# Patient Record
Sex: FEMALE | Race: WHITE
[De-identification: names, ages, dates, MRNs, and addresses within clinical notes are randomized per-mention and may not be internally consistent; named-entity substitution may affect disease eponyms.]

---

## 2017-10-30 ENCOUNTER — HOSPITAL ENCOUNTER (EMERGENCY)
Dept: HOSPITAL 75 - ER | Age: 27
Discharge: HOME | End: 2017-10-30
Payer: SELF-PAY

## 2017-10-30 VITALS — HEIGHT: 72 IN | WEIGHT: 254 LBS | BODY MASS INDEX: 34.4 KG/M2

## 2017-10-30 DIAGNOSIS — F41.9: ICD-10-CM

## 2017-10-30 DIAGNOSIS — Z87.891: ICD-10-CM

## 2017-10-30 DIAGNOSIS — F31.9: ICD-10-CM

## 2017-10-30 DIAGNOSIS — Z80.0: ICD-10-CM

## 2017-10-30 DIAGNOSIS — J40: Primary | ICD-10-CM

## 2017-10-30 PROCEDURE — 71020: CPT

## 2017-10-30 PROCEDURE — 99284 EMERGENCY DEPT VISIT MOD MDM: CPT

## 2017-10-30 NOTE — XMS REPORT
[facility]

 Created on: 10/11/2016



Jr Rico

External Reference #: [id number]

: 1990

Sex: Female



Demographics







 Address  [address] [place]  [address]

 

 Home Phone  [phone]

 

 Preferred Language  Unknown

 

 Marital Status  Unknown

 

 Confucianist Affiliation  Unknown

 

 Race  White

 

 Ethnic Group  Not  or 





Author







 Author  CLAUDY MCCOLLUM  eClinicalWorks

 

 Address  Unknown

 

 Phone  Unavailable







Care Team Providers







 Care Team Member Name  Role  Phone

 

 CLAUDY MCCOLLUM  CP  Unavailable



                                                                



Allergies, Adverse Reactions, Alerts

          





 Substance  Reaction  Event Type

 

 N.K.D.A.  Info Not Available  Non Drug Allergy



                                                                               
         



Problems

          





 Problem Type  Condition  Code  Onset Dates  Condition Status

 

 Assessment  27 weeks gestation of pregnancy  Z3A.27     Active

 

 Problem  Proteinuria affecting pregnancy in second trimester  O12.12     Active

 

 Problem  Hypertension affecting pregnancy in second trimester  O16.2     Active

 

 Problem  Partial placenta previa, second trimester  O44.12     Active

 

 Assessment  Diabetes mellitus screening  Z13.1     Active

 

 Assessment  Pregnancy, first, second trimester  Z34.02     Active

 

 Problem  Abnormal quad screen  O28.0     Active

 

 Problem  Elevated blood pressure affecting pregnancy in first trimester, 
antepartum  O13.1     Active



                                                                               
                                                                               



Medications

          





 Medication  Code System  Code  Instructions  Start Date  End Date  Status  
Dosage

 

 Robitussin DM  NDC  [address]-15               not defined

 

 Prenatal  NDC  [address]15-[address]               not defined



                                                                               
                   



Procedures

          





 Procedure  Coding System  Code  Date

 

 LAB NOT BILLED BY [facility]  CPT-4  NOBLL  Oct 07, 2016

 

 Office Visit, Est Pt., Level 2  CPT-4  87193  Oct 07, 2016

 

 URINE-NO MICRO  CPT-4  82415  Oct 07, 2016

 

 VENIPUNCT, ROUTINE*  CPT-4  16793  Oct 07, 2016



                                                                               
                                                 



Vital Signs

          





 Date/Time:  Oct 07, 2016

 

 Cardiac Monitoring Heart Rate  100 bpm

 

 Weight  272.2 lbs

 

 Height  71 in

 

 BMI  37.964 Index

 

 Blood Pressure Diastolic  82 mmHg

 

 Blood Pressure Systolic  134 mmHg



                                                                    



Results

          





 Name  Result  Date  Reference Range  Unit  Abnormality Flag

 

 GLUCOSE CARTER 1 HOUR               

 

 ----Gestational Diabetes Screen  119  60649249     mg/dL   

 

 CBC               

 

 ----Basos  0  88168621     %   

 

 ----MCV  92  89850213  79-97   fL   

 

 ----Hematocrit  37.7  66998006  34.0-46.6   %   

 

 ----Eos  2  40593072     %   

 

 ----MCHC  34.0  53826406  31.5-35.7   g/dL   

 

 ----Monocytes  6  2016     %   

 

 ----MCH  31.3  05084088  26.6-33.0   pg   

 

 ----Lymphs  21  2016     %   

 

 ----Eos (Absolute)  0.2  56486616  0.0-0.4   x10E3/uL   

 

 ----WBC  9.9  46652363  3.4-10.8   x10E3/uL   

 

 ----Monocytes(Absolute)  0.6  58063018  0.1-0.9   x10E3/uL   

 

 ----Lymphs (Absolute)  2.1  79983610  0.7-3.1   x10E3/uL   

 

 ----Hemoglobin  12.8  94867556  11.1-15.9   g/dL   

 

 ----Neutrophils (Absolute)  7.0  51513904  1.4-7.0   x10E3/uL   

 

 ----RBC  4.09  96262700  3.77-5.28   x10E6/uL   

 

 ----Immature Grans (Abs)  0.0  47435865  0.0-0.1   x10E3/uL   

 

 ----Immature Granulocytes  0  2016     %   

 

 ----Neutrophils  71  14334087     %   

 

 ----Baso (Absolute)  0.0  74699166  0.0-0.2   x10E3/uL   

 

 ----RDW  13.4  30240327  12.3-15.4   %   

 

 ----Platelets  185  76767695  150-379   x10E3/uL   

 

 UA OB DIP (IN HOUSE)               

 

 ----Glucose  negative  2016         

 

 ----Protein  trace  2016         

 

 ROUTINE VENIPUNCTURE               



                                                                               
                   



Summary Purpose

          eClinicalWorks Submission

## 2017-10-30 NOTE — XMS REPORT
[facility]

 Created on: 2017



Jr Rico

External Reference #: [id number]

: 1990

Sex: Female



Demographics







 Address  [address] [place]  [address]

 

 Preferred Language  Unknown

 

 Marital Status  Unknown

 

 Episcopal Affiliation  Unknown

 

 Race  Unknown

 

 Ethnic Group  Unknown





Author







 Author  CLAUDY MCCOLLUM

 

 [facility]

 

 Address  [address] [place]  [address]



 

 Phone  [phone]







Care Team Providers







 Care Team Member Name  Role  Phone

 

 CLAUDY MCCOLLUM  Unavailable  [phone]







PROBLEMS







 Type  Condition  ICD9-CM Code  XOI88-US Code  Onset Dates  Condition Status  
SNOMED Code

 

 Problem  Generalized anxiety disorder     F41.1     Active  05033058

 

 Problem  Anxiety, generalized     F41.1     Active  80431588

 

 Problem  Postpartum depression     F53     Active  29049290

 

 Problem  Patient is a currently breast-feeding mother     Z39.1     Active  
632867875

 

 Problem  History of gestational hypertension     Z87.59     Active  187601512







ALLERGIES

Unknown Allergies



SOCIAL HISTORY

No smoking Hx information available



PLAN OF CARE





VITAL SIGNS





MEDICATIONS







 Medication  Instructions  Dosage  Frequency  Start Date  End Date  Duration  
Status

 

 NIFEdipine ER 60 mg  Orally Once a day  1 tablet on an empty stomach  24h       30 days  Active







RESULTS

No Results



PROCEDURES

No Known procedures



IMMUNIZATIONS

No Known Immunizations

## 2017-10-30 NOTE — XMS REPORT
[facility]

 Created on: 2016



Jr Rico

External Reference #: [id number]

: 1990

Sex: Female



Demographics







 Address  [address] [place]  [address]

 

 Home Phone  [phone]

 

 Preferred Language  Unknown

 

 Marital Status  Unknown

 

 Jew Affiliation  Unknown

 

 Race  White

 

 Ethnic Group  Not  or 





Author







 Author  CLAUDY MCCOLLUM

 

 [facility]  eClinical[employer]

 

 Address  Unknown

 

 Phone  Unavailable







Care Team Providers







 Care Team Member Name  Role  Phone

 

 CLAUDY MCCOLLUM    Unavailable



                                                                



Allergies

          No Known Allergies                                                   
                                     



Problems

          





 Problem Type  Condition  Code  Onset Dates  Condition Status

 

 Problem  Proteinuria affecting pregnancy in second trimester  O12.12     Active

 

 Problem  Hypertension affecting pregnancy in second trimester  O16.2     Active

 

 Problem  Partial placenta previa, second trimester  O44.12     Active

 

 Assessment  Hypertension affecting pregnancy in second trimester  O16.2     
Active

 

 Assessment  Proteinuria affecting pregnancy in second trimester  O12.12     
Active

 

 Problem  Abnormal quad screen  O28.0     Active

 

 Problem  Elevated blood pressure affecting pregnancy in first trimester, 
antepartum  O13.1     Active



                                                                               
                                                                     



Medications

          No Known Medications                                                 
                             



Results

          No Known Results                                                     
               



Summary Purpose

          eClinicalWorks Submission

## 2017-10-30 NOTE — XMS REPORT
[facility]

 Created on: 2016



Jr Rico

External Reference #: [id number]

: 1990

Sex: Female



Demographics







 Address  [address] [place]  [address]

 

 Home Phone  [phone]

 

 Preferred Language  Unknown

 

 Marital Status  Unknown

 

 Taoist Affiliation  Unknown

 

 Race  White

 

 Ethnic Group  Not  or 





Author







 Author  CLAUDY MCCOLLUM  eClinicalWorks

 

 Address  Unknown

 

 Phone  Unavailable







Care Team Providers







 Care Team Member Name  Role  Phone

 

 CLAUDY MCCOLLUM    Unavailable



                                                                



Allergies

          No Known Allergies                                                   
                                     



Problems

          





 Problem Type  Condition  Code  Onset Dates  Condition Status

 

 Problem  Elevated blood pressure affecting pregnancy in first trimester, 
antepartum  O13.1     Active

 

 Problem  Proteinuria affecting pregnancy in third trimester  O12.13     Active

 

 Problem  Third trimester bleeding  O46.93     Active

 

 Problem  Chronic hypertension affecting pregnancy  O10.919     Active

 

 Problem  Hypertension affecting pregnancy in second trimester  O16.2     Active

 

 Problem  Abnormal quad screen  O28.0     Active

 

 Problem  Partial placenta previa, second trimester  O44.12     Active

 

 Problem  Proteinuria affecting pregnancy in second trimester  O12.12     Active



                                                                               
                                                                               



Medications

          No Known Medications                                                 
                             



Results

          No Known Results                                                     
               



Summary Purpose

          eClinicalWorks Submission

## 2017-10-30 NOTE — XMS REPORT
[facility]

 Created on: 2017



Jr Rico

External Reference #: [id number]

: 1990

Sex: Female



Demographics







 Address  [address] [place]  [address]

 

 Preferred Language  Unknown

 

 Marital Status  Unknown

 

 Orthodox Affiliation  Unknown

 

 Race  Unknown

 

 Ethnic Group  Unknown





Author







 Author  CLAUDY MCCOLLUM

 

 [facility]

 

 Address  [address] [place]  [address]



 

 Phone  [phone]







Care Team Providers







 Care Team Member Name  Role  Phone

 

 CLAUDY MCCOLLUM  Unavailable  [phone]







PROBLEMS







 Type  Condition  ICD9-CM Code  XXK11-SZ Code  Onset Dates  Condition Status  
SNOMED Code

 

 Problem  Anxiety, generalized     F41.1     Active  10305677

 

 Problem  Generalized anxiety disorder     F41.1     Active  08299269

 

 Problem  Postpartum depression     F53     Active  77225006

 

 Problem  Patient is a currently breast-feeding mother     Z39.1     Active  
489161957

 

 Problem  History of gestational hypertension     Z87.59     Active  335619789







ALLERGIES

Unknown Allergies



SOCIAL HISTORY

No smoking Hx information available



PLAN OF CARE





VITAL SIGNS







 Height  71 in  2016

 

 Weight  286.5 lbs  2016

 

 Temperature  97.9 degrees Fahrenheit  2016

 

 Heart Rate  80 bpm  2016

 

 Respiratory Rate  20   2016

 

 BMI  39.95 kg/m2  2016

 

 Blood pressure systolic  156 mmHg  2016

 

 Blood pressure diastolic  92 mmHg  2016







MEDICATIONS

Unknown Medications



RESULTS

No Results



PROCEDURES

No Known procedures



IMMUNIZATIONS

No Known Immunizations

## 2017-10-30 NOTE — XMS REPORT
[facility]

 Created on: 2017



Jr Rico

External Reference #: [id number]

: 1990

Sex: Female



Demographics







 Address  [address] [place]  [address]

 

 Preferred Language  Unknown

 

 Marital Status  Unknown

 

 Hoahaoism Affiliation  Unknown

 

 Race  Unknown

 

 Ethnic Group  Unknown





Author







 Author  CLAUDY MCCOLLUM

 

 [facility]

 

 Address  [address] [place]  [address]



 

 Phone  [phone]







Care Team Providers







 Care Team Member Name  Role  Phone

 

 CLAUDY MCCOLLUM  Unavailable  [phone]







PROBLEMS







 Type  Condition  ICD9-CM Code  YKG38-TU Code  Onset Dates  Condition Status  
SNOMED Code

 

 Problem  Anxiety, generalized     F41.1     Active  70446248

 

 Problem  Generalized anxiety disorder     F41.1     Active  97135902

 

 Problem  Postpartum depression     F53     Active  76146877

 

 Problem  Patient is a currently breast-feeding mother     Z39.1     Active  
224857428

 

 Problem  History of gestational hypertension     Z87.59     Active  415314032







ALLERGIES

Unknown Allergies



SOCIAL HISTORY

No smoking Hx information available



PLAN OF CARE





VITAL SIGNS





MEDICATIONS

Unknown Medications



RESULTS

No Results



PROCEDURES

No Known procedures



IMMUNIZATIONS

No Known Immunizations

## 2017-10-30 NOTE — XMS REPORT
[facility]

 Created on: 2017



Jr Rico

External Reference #: [id number]

: 1990

Sex: Female



Demographics







 Address  [address] [place]  [address]

 

 Preferred Language  Unknown

 

 Marital Status  Unknown

 

 Mormon Affiliation  Unknown

 

 Race  Unknown

 

 Ethnic Group  Unknown





Author







 Author  ANIKA MARADIAGA

 

 [facility]

 

 Address  [address] [place]  [address]



 

 Phone  [phone]







Care Team Providers







 Care Team Member Name  Role  Phone

 

 ANIKA MARADIAGA  Unavailable  [phone]







PROBLEMS







 Type  Condition  ICD9-CM Code  CDB89-XG Code  Onset Dates  Condition Status  
SNOMED Code

 

 Problem  Generalized anxiety disorder     F41.1     Active  68469261

 

 Problem  Anxiety, generalized     F41.1     Active  08599380

 

 Problem  Postpartum depression     F53     Active  07123894

 

 Problem  Patient is a currently breast-feeding mother     Z39.1     Active  
883098274

 

 Problem  History of gestational hypertension     Z87.59     Active  571100751







ALLERGIES

Unknown Allergies



SOCIAL HISTORY

No smoking Hx information available



PLAN OF CARE







 Activity  Details









  









 Follow Up  1 Week Reason:Depression and anxiety







VITAL SIGNS





MEDICATIONS

Unknown Medications



RESULTS

No Results



PROCEDURES







 Procedure  Date Ordered  Related Diagnosis  Body Site

 

 Psych diagnostic evaluation, new patient  2017      







IMMUNIZATIONS

No Known Immunizations

## 2017-10-30 NOTE — XMS REPORT
[facility]

 Created on: 2016



Jr Rico

External Reference #: [id number]

: 1990

Sex: Female



Demographics







 Address  [place]  [address]

 

 Home Phone  [phone]

 

 Preferred Language  Unknown

 

 Marital Status  Unknown

 

 Restorationism Affiliation  Unknown

 

 Race  White

 

 Ethnic Group  Not  or 





Author







 Author  CLAUDY MCCOLLUM  eClinicalWorks

 

 Address  Unknown

 

 Phone  Unavailable







Care Team Providers







 Care Team Member Name  Role  Phone

 

 CLAUDY MCCOLLUM    Unavailable



                                                                



Allergies

          No Known Allergies                                                   
                                     



Problems

          





 Problem Type  Condition  Code  Onset Dates  Condition Status

 

 Problem  Elevated blood pressure affecting pregnancy in first trimester, 
antepartum  O13.1     Active

 

 Assessment  Abnormal quad screen  O28.0     Active

 

 Problem  Abnormal quad screen  O28.0     Active

 

 Assessment  17 weeks gestation of pregnancy  Z3A.17     Active



                                                                               
                                       



Medications

          





 Medication  Code System  Code  Instructions  Start Date  End Date  Status  
Dosage

 

 Prenatal  NDC  [address]               not defined



                                                                               
         



Procedures

          





 Procedure  Coding System  Code  Date

 

 Office Visit, Est Pt., Level 2  CPT-4  56504  Aug 04, 2016



                                                                               
                   



Vital Signs

          





 Date/Time:  Aug 04, 2016

 

 Cardiac Monitoring Heart Rate  94 bpm

 

 Weight  263.3 lbs

 

 Height  71 in

 

 BMI  36.723 Index

 

 Blood Pressure Diastolic  79 mmHg

 

 Blood Pressure Systolic  136 mmHg



                                                                              



Results

          No Known Results                                                     
               



Summary Purpose

          eClinicalWorks Submission

## 2017-10-30 NOTE — XMS REPORT
[facility]

 Created on: 2016



Jr Rico

External Reference #: [id number]

: 1990

Sex: Female



Demographics







 Address  [address] [place]  [address]

 

 Home Phone  [phone]

 

 Preferred Language  Unknown

 

 Marital Status  Unknown

 

 Tenriism Affiliation  Unknown

 

 Race  White

 

 Ethnic Group  Not  or 





Author







 Author  CLAUDY MCCOLLUM  eClinicalWorks

 

 Address  Unknown

 

 Phone  Unavailable







Care Team Providers







 Care Team Member Name  Role  Phone

 

 CLAUDY MCCOLLUM  CP  Unavailable



                                                                



Allergies

          No Known Allergies                                                   
                                     



Problems

          





 Problem Type  Condition  Code  Onset Dates  Condition Status

 

 Problem  Proteinuria affecting pregnancy in second trimester  O12.12     Active

 

 Problem  Hypertension affecting pregnancy in second trimester  O16.2     Active

 

 Problem  Partial placenta previa, second trimester  O44.12     Active

 

 Assessment  Hypertension affecting pregnancy in second trimester  O16.2     
Active

 

 Problem  Abnormal quad screen  O28.0     Active

 

 Problem  Elevated blood pressure affecting pregnancy in first trimester, 
antepartum  O13.1     Active



                                                                               
                                                           



Medications

          No Known Medications                                                 
                   



Results

          





 Name  Result  Date  Reference Range  Unit  Abnormality Flag

 

 UA OB DIP (IN HOUSE)               

 

 ----Glucose  negative  2016         

 

 ----Protein  trace  2016         



                                                                    



Summary Purpose

          eClinicalWorks Submission

## 2017-10-30 NOTE — XMS REPORT
[facility]

 Created on: 2017



Jr Rico

External Reference #: [id number]

: 1990

Sex: Female



Demographics







 Address  [address] [place]  [address]

 

 Preferred Language  Unknown

 

 Marital Status  Unknown

 

 Pentecostalism Affiliation  Unknown

 

 Race  Unknown

 

 Ethnic Group  Unknown





Author







 Author  CLAUDY MCCOLLUM

 

 [facility]

 

 Address  [address] [place]  [address]



 

 Phone  [phone]







Care Team Providers







 Care Team Member Name  Role  Phone

 

 CLAUDY MCCOLLUM  Unavailable  [phone]







PROBLEMS







 Type  Condition  ICD9-CM Code  XCC10-GJ Code  Onset Dates  Condition Status  
SNOMED Code

 

 Problem  Generalized anxiety disorder     F41.1     Active  65961283

 

 Problem  Anxiety, generalized     F41.1     Active  61369969

 

 Problem  Postpartum depression     F53     Active  66004603

 

 Problem  Patient is a currently breast-feeding mother     Z39.1     Active  
857775607

 

 Problem  History of gestational hypertension     Z87.59     Active  624374435







ALLERGIES

Unknown Allergies



SOCIAL HISTORY

No smoking Hx information available



PLAN OF CARE





VITAL SIGNS





MEDICATIONS







 Medication  Instructions  Dosage  Frequency  Start Date  End Date  Duration  
Status

 

 Prozac 20 mg  Orally Once a day  1 capsule in the morning  24h    
   30 day(s)  Active







RESULTS

No Results



PROCEDURES

No Known procedures



IMMUNIZATIONS

No Known Immunizations

## 2017-10-30 NOTE — XMS REPORT
[facility]

 Created on: 2017



Jr Rico

External Reference #: [id number]

: 1990

Sex: Female



Demographics







 Address  [address] [place]  [address]

 

 Preferred Language  Unknown

 

 Marital Status  Unknown

 

 Mu-ism Affiliation  Unknown

 

 Race  Unknown

 

 Ethnic Group  Unknown





Author







 Author  CHUNCLAUDY LANDEROS

 

 Organization  [facility]

 

 Address  [address] [place]  [address]



 

 Phone  [phone]







Care Team Providers







 Care Team Member Name  Role  Phone

 

 CLAUDY MCCOLLUM  Unavailable  [phone]







PROBLEMS







 Type  Condition  ICD9-CM Code  DKU28-WG Code  Onset Dates  Condition Status  
SNOMED Code

 

 Assessment  25 weeks gestation of pregnancy     Z3A.25  23 Sep, 2016  Active  
12127748

 

 Assessment  Encounter for immunization     Z23  23 Sep, 2016  Active  001255701

 

 Problem  Partial placenta previa, second trimester     O44.12     Active  
55843294

 

 Problem  Proteinuria affecting pregnancy in second trimester     O12.12     
Active  72624999

 

 Problem  Elevated blood pressure affecting pregnancy in first trimester, 
antepartum     O13.1     Active  24398666

 

 Assessment  Pregnancy, first, second trimester     Z34.02  23 Sep, 2016  
Active  120968951

 

 Problem  Hypertension affecting pregnancy in second trimester     O16.2     
Active  286784870

 

 Problem  Abnormal quad screen     O28.0     Active  181820332







ALLERGIES







 Substance  Reaction  Event Type  Date  Status

 

 N.K.D.A.  Unknown  Non Drug Allergy  23 Sep, 2016  Unknown







SOCIAL HISTORY

No smoking Hx information available



PLAN OF CARE





VITAL SIGNS







 Height  71 in  2016

 

 Weight  270.9 lbs  2016

 

 Heart Rate  92 bpm  2016

 

 Respiratory Rate  20   2016

 

 BMI  37.783 kg/m2  2016

 

 Blood pressure systolic  130 mmHg  2016

 

 Blood pressure diastolic  82 mmHg  2016







MEDICATIONS







 Medication  Instructions  Dosage  Frequency  Start Date  End Date  Duration  
Status

 

 Prenatal                    Active







RESULTS







 Name  Result  Date  Reference Range

 

 UA OB DIP (IN HOUSE)     2016   

 

 Glucose  negative      

 

 Protein  trace      







PROCEDURES







 Procedure  Date Ordered  Related Diagnosis  Body Site

 

 URINE-NO MICRO  2016      

 

 Office Visit, Est Pt., Level 2  2016      

 

 SINGLE IMMUNIZATION ADMIN  2016      

 

 FLUARIX QUAD P-FREE 3 AND UP .50 2016  2016      







IMMUNIZATIONS







 Vaccine  Route  Administration Date  Status

 

 FLUARIX QUAD P-FREE 3 AND UP .50 2016  IM Intramuscular  2016  
Administered

## 2017-10-30 NOTE — XMS REPORT
[facility]

 Created on: 2016



Jr Rico

External Reference #: [id number]

: 1990

Sex: Female



Demographics







 Address  [address] [place]  [address]

 

 Home Phone  [phone]

 

 Preferred Language  Unknown

 

 Marital Status  Unknown

 

 [employer] Affiliation  Unknown

 

 Race  White

 

 Ethnic Group  Not  or 





Author







 Author  CLAUDY MCCOLLUM  eClinicalWorks

 

 Address  Unknown

 

 Phone  Unavailable







Care Team Providers







 Care Team Member Name  Role  Phone

 

 CLAUDY MCCOLLUM    Unavailable



                                                                



Allergies

          No Known Allergies                                                   
                                     



Problems

          





 Problem Type  Condition  Code  Onset Dates  Condition Status

 

 Problem  Elevated blood pressure affecting pregnancy in first trimester, 
antepartum  O13.1     Active

 

 Assessment  Pregnancy, first, second trimester  Z34.02     Active

 

 Problem  Abnormal quad screen  O28.0     Active

 

 Assessment  21 weeks gestation of pregnancy  Z3A.21     Active

 

 Assessment  Evaluate anatomy not seen on prior sonogram  Z36     Active

 

 Assessment  Hypertension affecting pregnancy, second trimester  O16.2     
Active

 

 Assessment  Proteinuria affecting pregnancy in third trimester  O12.13     
Active



                                                                               
                                                                     



Medications

          





 Medication  Code System  Code  Instructions  Start Date  End Date  Status  
Dosage

 

 Prenatal  NDC  [address]               not defined



                                                                               
         



Procedures

          





 Procedure  Coding System  Code  Date

 

 LACTATE (LD) (LDH) ENZYME  CPT-4  43023  Aug 26, 2016

 

 ASSAY OF BLOOD/URIC ACID  CPT-4  23542  Aug 26, 2016

 

 URINE-NO MICRO  CPT-4  69277  Aug 26, 2016

 

 COMPLETE CBC W/AUTO DIFF WBC  CPT-4  22792  Aug 26, 2016

 

 COMPREHEN METABOLIC PANEL  CPT-4  07483  Aug 26, 2016

 

 VENIPUNCT, ROUTINE*  CPT-4  12808  Aug 26, 2016

 

 Office Visit, Est Pt., Level 3  CPT-4  82732  Aug 26, 2016



                                                                               
                                                                               



Vital Signs

          





 Date/Time:  Aug 26, 2016

 

 Cardiac Monitoring Heart Rate  90 bpm

 

 Weight  264.4 lbs

 

 Height  71 in

 

 BMI  36.876 Index

 

 Blood Pressure Diastolic  79 mmHg

 

 Blood Pressure Systolic  144 mmHg



                                                                              



Results

          No Known Results                                                     
               



Summary Purpose

          eClinicalWorks Submission

## 2017-10-30 NOTE — XMS REPORT
[facility]

 Created on: 2017



Jr Rico

External Reference #: [id number]

: 1990

Sex: Female



Demographics







 Address  [address] [place]  [address]

 

 Preferred Language  Unknown

 

 Marital Status  Unknown

 

 Ep[employer]opalian Affiliation  Unknown

 

 Race  Unknown

 

 Ethnic Group  Unknown





Author







 Author  CHUN  CLAUDY

 

 Organization  [facility]

 

 Address  [address] [place]  [address]



 

 Phone  [phone]







Care Team Providers







 Care Team Member Name  Role  Phone

 

 CHUNEUFEMIA LANDEROSHANY  Unavailable  [phone]







PROBLEMS







 Type  Condition  ICD9-CM Code  RJU22-NL Code  Onset Dates  Condition Status  
SNOMED Code

 

 Problem  Abnormal quad screen     O28.0     Active  402120604

 

 Problem  Elevated blood pressure affecting pregnancy in first trimester, 
antepartum     O13.1     Active  62667374

 

 Assessment  22 weeks gestation of pregnancy     Z3A.22  08 Sep, 2016  Active  
46897815

 

 Assessment  Hypertension affecting pregnancy in second trimester     O16.2  08 
Sep, 2016  Active  185095660

 

 Assessment  Proteinuria affecting pregnancy in second trimester     O12.12  08 
Sep, 2016  Active  22827839







ALLERGIES

Unknown Allergies



SOCIAL HISTORY

No smoking Hx information available



PLAN OF CARE





VITAL SIGNS







 Height  71 in  2016

 

 Weight  270.0 lbs  2016

 

 Heart Rate  78 bpm  2016

 

 Respiratory Rate  18   2016

 

 BMI  37.657 kg/m2  2016

 

 Blood pressure systolic  126 mmHg  2016

 

 Blood pressure diastolic  80 mmHg  2016







MEDICATIONS







 Medication  Instructions  Dosage  Frequency  Start Date  End Date  Duration  
Status

 

 Prenatal                    Active







RESULTS







 Name  Result  Date  Reference Range

 

 UA OB DIP (IN HOUSE)     2016   

 

 Glucose  Negative      

 

 Protein  Trace      







PROCEDURES







 Procedure  Date Ordered  Related Diagnosis  Body Site

 

 URINE-NO MICRO  2016      

 

 Office Visit, Est Pt., Level 2  2016      







IMMUNIZATIONS

No Known Immunizations

## 2017-10-30 NOTE — XMS REPORT
[facility]

 Created on: 2017



Jr Rico

External Reference #: [id number]

: 1990

Sex: Female



Demographics







 Address  [address] [place]  [address]

 

 Preferred Language  Unknown

 

 Marital Status  Unknown

 

 Islam Affiliation  Unknown

 

 Race  Unknown

 

 Ethnic Group  Unknown





Author







 Author  CHUN  CLAUDY

 

 Organization  [facility]

 

 Address  [address] [place]  [address]



 

 Phone  [phone]







Care Team Providers







 Care Team Member Name  Role  Phone

 

 AMADEO MCCOLLUMY  Unavailable  [phone]







PROBLEMS







 Type  Condition  ICD9-CM Code  SZF39-AW Code  Onset Dates  Condition Status  
SNOMED Code

 

 Problem  Proteinuria affecting pregnancy in second trimester     O12.12     
Active  26544562

 

 Problem  Hypertension affecting pregnancy in second trimester     O16.2     
Active  324172291

 

 Assessment  Pregnancy, first, second trimester     Z34.02  16 Sep, 2016  
Active  145397340

 

 Problem  Abnormal quad screen     O28.0     Active  235969407

 

 Problem  Elevated blood pressure affecting pregnancy in first trimester, 
antepartum     O13.1     Active  45067958







ALLERGIES







 Substance  Reaction  Event Type  Date  Status

 

 N.K.D.A.  Unknown  Non Drug Allergy  16 Sep, 2016  Unknown







SOCIAL HISTORY

No smoking Hx information available



PLAN OF CARE





VITAL SIGNS







 Height  71 in  2016

 

 Weight  272 lbs  2016

 

 Heart Rate  100 bpm  2016

 

 Respiratory Rate  20   2016

 

 BMI  37.93 kg/m2  2016

 

 Blood pressure systolic  128 mmHg  2016

 

 Blood pressure diastolic  78 mmHg  2016







MEDICATIONS







 Medication  Instructions  Dosage  Frequency  Start Date  End Date  Duration  
Status

 

 Prenatal                    Active







RESULTS







 Name  Result  Date  Reference Range

 

 UA OB DIP (IN HOUSE)     2016   

 

 Glucose  negative      

 

 Protein  negative      







PROCEDURES







 Procedure  Date Ordered  Related Diagnosis  Body Site

 

 URINE-NO MICRO  2016      







IMMUNIZATIONS

No Known Immunizations

## 2017-10-30 NOTE — XMS REPORT
[facility]

 Created on: 2016



Jr Rico

External Reference #: [id number]

: 1990

Sex: Female



Demographics







 Address  [address] [place]  [address]

 

 Home Phone  [phone]

 

 Preferred Language  Unknown

 

 Marital Status  Unknown

 

 Faith Affiliation  Unknown

 

 Race  White

 

 Ethnic Group  Not  or 





Author







 Author  CLAUDY MCCOLLUM  eClinicalWorks

 

 Address  Unknown

 

 Phone  Unavailable







Care Team Providers







 Care Team Member Name  Role  Phone

 

 CLAUDY MCCOLLUM    Unavailable



                                                                



Allergies

          No Known Allergies                                                   
                                     



Problems

          





 Problem Type  Condition  Code  Onset Dates  Condition Status

 

 Problem  Elevated blood pressure affecting pregnancy in first trimester, 
antepartum  O13.1     Active

 

 Problem  Proteinuria affecting pregnancy in third trimester  O12.13     Active

 

 Problem  Third trimester bleeding  O46.93     Active

 

 Problem  Chronic hypertension affecting pregnancy  O10.919     Active

 

 Problem  Hypertension affecting pregnancy in second trimester  O16.2     Active

 

 Problem  Abnormal quad screen  O28.0     Active

 

 Problem  Partial placenta previa, second trimester  O44.12     Active

 

 Problem  Proteinuria affecting pregnancy in second trimester  O12.12     Active



                                                                               
                                                                               



Medications

          No Known Medications                                                 
                             



Results

          No Known Results                                                     
               



Summary Purpose

          eClinicalWorks Submission

## 2017-10-30 NOTE — XMS REPORT
[facility]

 Created on: 2016



Jr Rico

External Reference #: [id number]

: 1990

Sex: Female



Demographics







 Address  [address] [place]  [address]

 

 Home Phone  [phone]

 

 Preferred Language  Unknown

 

 Marital Status  Unknown

 

 Christian Affiliation  Unknown

 

 Race  White

 

 Ethnic Group  Not  or 





Author







 Author  CLAUDY MCCOLLUM  eClinicalWorks

 

 Address  Unknown

 

 Phone  Unavailable







Care Team Providers







 Care Team Member Name  Role  Phone

 

 CLAUDY MCCOLLUM    Unavailable



                                                                



Allergies

          No Known Allergies                                                   
                                     



Problems

          





 Problem Type  Condition  Code  Onset Dates  Condition Status

 

 Problem  Elevated blood pressure affecting pregnancy in first trimester, 
antepartum  O13.1     Active

 

 Assessment  Pregnancy, first, second trimester  Z34.02     Active

 

 Problem  Abnormal quad screen  O28.0     Active

 

 Assessment  21 weeks gestation of pregnancy  Z3A.21     Active

 

 Assessment  Evaluate anatomy not seen on prior sonogram  Z36     Active

 

 Assessment  Proteinuria affecting pregnancy in third trimester  O12.13     
Active

 

 Assessment  Hypertension affecting pregnancy, second trimester  O16.2     
Active



                                                                               
                                                                     



Medications

          No Known Medications                                                 
                                       



Procedures

          





 Procedure  Coding System  Code  Date

 

 ASSAY OF PROTEIN, URINE  CPT-4  26853  Aug 29, 2016



                                                                              



Results

          No Known Results                                                     
               



Summary Purpose

          eClinicalWorks Submission

## 2017-10-30 NOTE — XMS REPORT
[facility]

 Created on: 2016



Jr Rico

External Reference #: [id number]

: 1990

Sex: Female



Demographics







 Address  [address] [place]  [address]

 

 Home Phone  [phone]

 

 Preferred Language  Unknown

 

 Marital Status  Unknown

 

 Hoahaoism Affiliation  Unknown

 

 Race  White

 

 Ethnic Group  Not  or 





Author







 Author  CLAUDY MCCOLLUM  eClinicalWorks

 

 Address  Unknown

 

 Phone  Unavailable







Care Team Providers







 Care Team Member Name  Role  Phone

 

 CLAUDY MCCOLLUM    Unavailable



                                                                



Allergies

          No Known Allergies                                                   
                                     



Problems

          





 Problem Type  Condition  Code  Onset Dates  Condition Status

 

 Problem  Proteinuria affecting pregnancy in second trimester  O12.12     Active

 

 Problem  Hypertension affecting pregnancy in second trimester  O16.2     Active

 

 Problem  Partial placenta previa, second trimester  O44.12     Active

 

 Assessment  Hypertension affecting pregnancy in second trimester  O16.2     
Active

 

 Assessment  Proteinuria affecting pregnancy in second trimester  O12.12     
Active

 

 Problem  Abnormal quad screen  O28.0     Active

 

 Problem  Elevated blood pressure affecting pregnancy in first trimester, 
antepartum  O13.1     Active



                                                                               
                                                                     



Medications

          No Known Medications                                                 
                                       



Procedures

          





 Procedure  Coding System  Code  Date

 

 VENIPUNCT, ROUTINE*  CPT-4  61947  Oct 04, 2016

 

 LAB NOT BILLED BY [facility]  CPT-4  NOBLL  Oct 04, 2016



                                                                              



Results

          





 Name  Result  Date  Reference Range  Unit  Abnormality Flag

 

 URIC ACID, SERUM               

 

 ----Uric Acid, Serum  5.0  46908531  2.5-7.1   mg/dL   

 

 URINE PROTEIN TO CREATININE RATIO               

 

 ----Creatinine, Urine  58.8  51588008  Not Estab.   mg/dL   

 

 ----Protein/Creat Ratio  170  24563317  0-200   mg/g creat   

 

 ----Protein,Total,Urine  10.0  52909787  Not Estab.   mg/dL   

 

 ROUTINE VENIPUNCTURE               

 

 LDH               

 

 ----LDH  152  37787282  119-226   IU/L   

 

 CBC               

 

 ----RDW  13.3  39187742  12.3-15.4   %   

 

 ----MCHC  34.6  78106436  31.5-35.7   g/dL   

 

 ----MCH  31.0  73941846  26.6-33.0   pg   

 

 ----MCV  89  39545044  79-97   fL   

 

 ----Hematocrit  38.1  48262373  34.0-46.6   %   

 

 ----Hemoglobin  13.2  43632926  11.1-15.9   g/dL   

 

 ----Immature Granulocytes  0  97041148     %   

 

 ----RBC  4.26  34889936  3.77-5.28   x10E6/uL   

 

 ----WBC  10.0  07243547  3.4-10.8   x10E3/uL   

 

 ----Immature Grans (Abs)  0.0  49665929  0.0-0.1   x10E3/uL   

 

 ----Eos (Absolute)  0.2  98308698  0.0-0.4   x10E3/uL   

 

 ----Basos  0  02437746     %   

 

 ----Baso (Absolute)  0.0  27941803  0.0-0.2   x10E3/uL   

 

 ----Neutrophils (Absolute)  7.2  62346712  1.4-7.0   x10E3/uL  H

 

 ----Lymphs (Absolute)  1.8  57684634  0.7-3.1   x10E3/uL   

 

 ----Monocytes(Absolute)  0.8  14669488  0.1-0.9   x10E3/uL   

 

 ----Neutrophils  72  60508814     %   

 

 ----Lymphs  18  20659001     %   

 

 ----Monocytes  8  05878720     %   

 

 ----Eos  2  06332295     %   

 

 ----Platelets  175  58973049  150-379   x10E3/uL   

 

 CMP               

 

 ----Creatinine, Serum  0.55  02698597  0.57-1.00   mg/dL  L

 

 ----BUN  6  88461667  6-20   mg/dL   

 

 ----eGFR If Africn Am  151  44517521      >59   mL/min/1.73   

 

 ----eGFR If NonAfricn Am  131  91220598      >59   mL/min/1.73   

 

 ----Sodium, Serum  140  87600811  134-144   mmol/L   

 

 ----BUN/Creatinine Ratio  11  48675968  8-20       

 

 ----Chloride, Serum  105  55010616     mmol/L   

 

 ----Potassium, Serum  4.0  32579391  3.5-5.2   mmol/L   

 

 ----Carbon Dioxide, Total  19  2016  18-29   mmol/L   

 

 ----Protein, Total, Serum  6.1  03773610  6.0-8.5   g/dL   

 

 ----Calcium, Serum  9.2  46105037  8.7-10.2   mg/dL   

 

 ----Globulin, Total  2.5  2016  1.5-4.5   g/dL   

 

 ----Albumin, Serum  3.6  2016  3.5-5.5   g/dL   

 

 ----Bilirubin, Total  0.2  2016  0.0-1.2   mg/dL   

 

 ----Glucose, Serum  100  2016  65-99   mg/dL  H

 

 ----A/G Ratio  1.4  2016  1.1-2.5       

 

 ----ALT (SGPT)  28  2016  0-32   IU/L   

 

 ----Alkaline Phosphatase, S  99  2016     IU/L   

 

 ----AST (SGOT)  21  2016  0-40   IU/L   



                                                                               
                                       



Summary Purpose

          eClinicalWorks Submission

## 2017-10-30 NOTE — XMS REPORT
[facility]

 Created on: 2017



Jr Rico

External Reference #: [id number]

: 1990

Sex: Female



Demographics







 Address  [address] [place]  [address]

 

 Preferred Language  Unknown

 

 Marital Status  Unknown

 

 Ep[employer] Affiliation  Unknown

 

 Race  Unknown

 

 Ethnic Group  Unknown





Author







 Author  CLAUDY MCCOLLUM

 

 [facility]

 

 Address  [address] [place]  [address]



 

 Phone  [phone]







Care Team Providers







 Care Team Member Name  Role  Phone

 

 CLAUDY MCCOLLUM  Unavailable  [phone]







PROBLEMS







 Type  Condition  ICD9-CM Code  XEZ68-FO Code  Onset Dates  Condition Status  
SNOMED Code

 

 Problem  Anxiety, generalized     F41.1     Active  04705690

 

 Problem  Generalized anxiety disorder     F41.1     Active  59011660

 

 Problem  Postpartum depression     F53     Active  20359909

 

 Problem  Patient is a currently breast-feeding mother     Z39.1     Active  
828821465

 

 Problem  History of gestational hypertension     Z87.59     Active  310829745







ALLERGIES

Unknown Allergies



SOCIAL HISTORY

No smoking Hx information available



PLAN OF CARE





VITAL SIGNS







 Height  71 in  2016

 

 Blood pressure systolic  142 mmHg  2016

 

 Blood pressure diastolic  100 mmHg  2016







MEDICATIONS

Unknown Medications



RESULTS

No Results



PROCEDURES

No Known procedures



IMMUNIZATIONS

No Known Immunizations

## 2017-10-30 NOTE — XMS REPORT
[facility]

 Created on: 2016



Jr Rico

External Reference #: [id number]

: 1990

Sex: Female



Demographics







 [place]  [address]

 

 Home Phone  [phone]

 

 Preferred Language  Unknown

 

 Marital Status  Unknown

 

 Restoration Affiliation  Unknown

 

 Race  White

 

 Ethnic Group  Not  or 





Author







 Author  CLAUYD MCCOLLUM  eClinicalWorks

 

 Address  Unknown

 

 Phone  Unavailable







Care Team Providers







 Care Team Member Name  Role  Phone

 

 CLAUDY MCCOLLUM  CP  Unavailable



                                                                



Allergies, Adverse Reactions, Alerts

          





 Substance  Reaction  Event Type

 

 N.K.D.A.  Info Not Available  Non Drug Allergy



                                                                               
         



Problems

          





 Problem Type  Condition  Code  Onset Dates  Condition Status

 

 Assessment  Normal pregnancy, first  Z34.00     Active

 

 Assessment  17 weeks gestation of pregnancy  Z3A.17     Active

 

 Problem  Elevated blood pressure affecting pregnancy in first trimester, 
antepartum  O13.1     Active



                                                                               
                             



Medications

          





 Medication  Code System  Code  Instructions  Start Date  End Date  Status  
Dosage

 

 Prenatal  NDC  [address]               not defined



                                                                               
         



Procedures

          





 Procedure  Coding System  Code  Date

 

 ALPHA-FETOPROTEIN, SERUM  CPT-4  82686  2016

 

 INHIBIN A  CPT-4  68652  2016

 

 URINE-NO MICRO  CPT-4  77307  2016

 

 CHORIONIC GONADOTROPIN TEST  CPT-4  80158  2016

 

 ASSAY OF ESTRIOL  CPT-4  84314  2016

 

 Office Visit, Est Pt., Level 2  CPT-4  19994  2016

 

 VENIPUNCT, ROUTINE*  CPT-4  68501  2016



                                                                               
                                                                               



Vital Signs

          





 Date/Time:  2016

 

 Cardiac Monitoring Heart Rate  90 bpm

 

 Weight  260.8 lbs

 

 Height  71 in

 

 BMI  36.374 Index

 

 Blood Pressure Diastolic  82 mmHg

 

 Blood Pressure Systolic  138 mmHg



                                                                              



Results

          No Known Results                                                     
               



Summary Purpose

          eClinicalWorks Submission

## 2017-10-30 NOTE — XMS REPORT
[facility]

 Created on: 10/13/2016



Jr Rico

External Reference #: [id number]

: 1990

Sex: Female



Demographics







 Address  [address] [place]  [address]

 

 Home Phone  [phone]

 

 Preferred Language  Unknown

 

 Marital Status  Unknown

 

 Lutheran Affiliation  Unknown

 

 Race  White

 

 Ethnic Group  Not  or 





Author







 Author  CLAUDY MCCOLLUM  eClinicalWorks

 

 Address  Unknown

 

 Phone  Unavailable







Care Team Providers







 Care Team Member Name  Role  Phone

 

 CLAUDY MCCOLLUM  CP  Unavailable



                                                                



Allergies, Adverse Reactions, Alerts

          





 Substance  Reaction  Event Type

 

 N.K.D.A.  Info Not Available  Non Drug Allergy



                                                                               
         



Problems

          





 Problem Type  Condition  Code  Onset Dates  Condition Status

 

 Assessment  Proteinuria affecting pregnancy in second trimester  O12.12     
Active

 

 Assessment  27 weeks gestation of pregnancy  Z3A.27     Active

 

 Problem  Proteinuria affecting pregnancy in second trimester  O12.12     Active

 

 Problem  Hypertension affecting pregnancy in second trimester  O16.2     Active

 

 Problem  Partial placenta previa, second trimester  O44.12     Active

 

 Assessment  Syncope, unspecified syncope type  R55     Active

 

 Assessment  Hypertension affecting pregnancy in second trimester  O16.2     
Active

 

 Problem  Abnormal quad screen  O28.0     Active

 

 Problem  Elevated blood pressure affecting pregnancy in first trimester, 
antepartum  O13.1     Active



                                                                               
                                                                               
          



Medications

          





 Medication  Code System  Code  Instructions  Start Date  End Date  Status  
Dosage

 

 Prenatal  NDC  [address]               not defined



                                                                               
         



Procedures

          





 Procedure  Coding System  Code  Date

 

 MEASURE BLOOD OXYGEN LEVEL  CPT-4  12211  Oct 12, 2016

 

 URINALYSIS, AUTO, W/O SCOPE  CPT-4  87580  Oct 12, 2016

 

 ELECTROCARDIOGRAM, TRACING  CPT-4  05806  Oct 12, 2016

 

 [facility]  CPT-4  NOBLL  Oct 12, 2016

 

 DRUG SCREEN NON TLC DEVICES  CPT-4  13590  Oct 12, 2016

 

 VENIPUNCT, ROUTINE*  CPT-4  26585  Oct 12, 2016

 

 Office Visit, Est Pt., Level 3  CPT-4  59646  Oct 12, 2016



                                                                               
                                                                               



Vital Signs

          





 Date/Time:  Oct 12, 2016

 

 Cardiac Monitoring Heart Rate  98 bpm

 

 Weight  273 lbs

 

 Height  71 in

 

 BMI  38.076 Index

 

 Oximetry  98 %

 

 Blood Pressure Diastolic  90, lay down 118 mmHg

 

 Blood Pressure Systolic  160 mmHg



                                                                    



Results

          





 Name  Result  Date  Reference Range  Unit  Abnormality Flag

 

 EKG, TRACING (IN-HOUSE)               



                                                                    



Summary Purpose

          eClinicalWorks Submission

## 2017-10-30 NOTE — XMS REPORT
[facility]

 Created on: 2017



Jr Rico

External Reference #: [id number]

: 1990

Sex: Female



Demographics







 Address  [address] [place]  [address]

 

 Preferred Language  Unknown

 

 Marital Status  Unknown

 

 Religion Affiliation  Unknown

 

 Race  Unknown

 

 Ethnic Group  Unknown





Author







 Author  ANIKA MARADIAGA

 

 [facility]

 

 Address  [address] [place]  [address]



 

 Phone  [phone]







Care Team Providers







 Care Team Member Name  Role  Phone

 

 ANIKA MARADIAGA  Unavailable  [phone]







PROBLEMS







 Type  Condition  ICD9-CM Code  ZKL93-JK Code  Onset Dates  Condition Status  
SNOMED Code

 

 Problem  Generalized anxiety disorder     F41.1     Active  47066592

 

 Problem  Anxiety, generalized     F41.1     Active  20638897

 

 Problem  Postpartum depression     F53     Active  43816591

 

 Problem  Patient is a currently breast-feeding mother     Z39.1     Active  
819240606

 

 Problem  History of gestational hypertension     Z87.59     Active  478623595







ALLERGIES

Unknown Allergies



SOCIAL HISTORY

No smoking Hx information available



PLAN OF CARE







 Activity  Details









  









 Follow Up  1 Week Reason:Depression







VITAL SIGNS





MEDICATIONS

Unknown Medications



RESULTS

No Results



PROCEDURES







 Procedure  Date Ordered  Related Diagnosis  Body Site

 

 Psychotherapy, patient &/family, 45 minutes, established patient  2017
      







IMMUNIZATIONS

No Known Immunizations

## 2017-10-30 NOTE — XMS REPORT
[facility]

 Created on: 09/15/2017



Nael Ricokattyguero

External Reference #: [id number]

: 1990

Sex: Female



Demographics







 Address  [address] [place]  [address]

 

 Preferred Language  Unknown

 

 Marital Status  Unknown

 

 Congregational Affiliation  Unknown

 

 Race  Unknown

 

 Ethnic Group  Unknown





Author







 Author  CHUNCLAUDY

 

 [facility]

 

 Address  [address] [place]  [address]



 

 Phone  [phone]







Care Team Providers







 Care Team Member Name  Role  Phone

 

 CLAUDY MCCOLLUM  Unavailable  [phone]







PROBLEMS







 Type  Condition  ICD9-CM Code  SPY07-LC Code  Onset Dates  Condition Status  
SNOMED Code

 

 Problem  Generalized anxiety disorder     F41.1     Active  55978895

 

 Problem  Anxiety, generalized     F41.1     Active  48899386

 

 Problem  Postpartum depression     F53     Active  70584580

 

 Problem  Patient is a currently breast-feeding mother     Z39.1     Active  
940447401

 

 Problem  History of gestational hypertension     Z87.59     Active  191322887







ALLERGIES







 Substance  Reaction  Event Type  Date  Status

 

 N.K.D.A.  Unknown  Non Drug Allergy    Unknown







SOCIAL HISTORY

No smoking Hx information available



PLAN OF CARE







 Activity  Details









  









 Follow Up  3 Months Reason:HTN, depression







VITAL SIGNS







 Height  71 in  2017

 

 Weight  258.0 lbs  2017

 

 Temperature  98.0 degrees Fahrenheit  2017

 

 Heart Rate  78 bpm  2017

 

 Respiratory Rate  18   2017

 

 BMI  35.98 kg/m2  2017

 

 Blood pressure systolic  136 mmHg  2017

 

 Blood pressure diastolic  78 mmHg  2017







MEDICATIONS







 Medication  Instructions  Dosage  Frequency  Start Date  End Date  Duration  
Status

 

 Prenatal                    Active

 

 Depo-Provera 150 MG/ML  Intramuscular every 12 weeks  1 ml        
     Active

 

 Prozac 20 mg  Orally Once a day  1 capsule in the morning  24h    
      Active

 

 NIFEdipine ER 60 mg  Orally Once a day  1 tablet on an empty stomach  24h          Active







RESULTS







 Name  Result  Date  Reference Range

 

 PREGNANCY TEST, URINE (IN HOUSE)     2017   

 

 RESULTS  negative      

 

 Lot #  [id number]      

 

 Control  +      

 

 Exp date  2018      







PROCEDURES







 Procedure  Date Ordered  Related Diagnosis  Body Site

 

 INJ MDRXYPRGESTRON CNTRACPT 150 MG  2017      

 

 THER/PROPH/DIAG INJ, SC/IM  2017      

 

 Office Visit, Est Pt., Level 3  2017      

 

 URINE PREGNANCY TEST  2017      







IMMUNIZATIONS







 Vaccine  Route  Administration Date  Status

 

 MEDRXYPROGESTERONE ACETATE  IM Intramuscular  2017  Administered

## 2017-10-30 NOTE — XMS REPORT
[facility]

 Created on: 2016



Jr Rico

External Reference #: [id number]

: 1990

Sex: Female



Demographics







 Address  [address] [place]  [address]

 

 Home Phone  [phone]

 

 Preferred Language  Unknown

 

 Marital Status  Unknown

 

 Advent Affiliation  Unknown

 

 Race  White

 

 Ethnic Group  Not  or 





Author







 Author  CLAUDY MCCOLLUM  eClinicalWorks

 

 Address  Unknown

 

 Phone  Unavailable







Care Team Providers







 Care Team Member Name  Role  Phone

 

 CLAUDY MCCOLLUM  CP  Unavailable



                                                                



Allergies

          No Known Allergies                                                   
                                     



Problems

          





 Problem Type  Condition  Code  Onset Dates  Condition Status

 

 Assessment  Abnormal quad screen  O28.0     Active

 

 Assessment  Third trimester pregnancy  Z33.1     Active

 

 Assessment  31 weeks gestation of pregnancy  Z3A.31     Active

 

 Assessment  Encounter for immunization  Z23     Active

 

 Assessment  Proteinuria affecting pregnancy in third trimester  O12.13     
Active

 

 Problem  Third trimester bleeding  O46.93     Active

 

 Problem  Partial placenta previa, second trimester  O44.12     Active

 

 Problem  Proteinuria affecting pregnancy in third trimester  O12.13     Active

 

 Problem  Abnormal quad screen  O28.0     Active

 

 Problem  Elevated blood pressure affecting pregnancy in first trimester, 
antepartum  O13.1     Active

 

 Problem  Proteinuria affecting pregnancy in second trimester  O12.12     Active

 

 Problem  Hypertension affecting pregnancy in second trimester  O16.2     Active



                                                                               
                                                                               
                                        



Medications

          





 Medication  Code System  Code  Instructions  Start Date  End Date  Status  
Dosage

 

 Prenatal  NDC  [address]               not defined



                                                                               
         



Procedures

          





 Procedure  Coding System  Code  Date

 

 LAB NOT BILLED BY [facility]  CPT-4  NOBLL  2016

 

 Office Visit, Est Pt., Level 3  CPT-4  47288  2016

 

 URINE-NO MICRO  CPT-4  89860  2016

 

 SINGLE IMMUNIZATION ADMIN  CPT-4  07689  2016

 

 TDAP (BOOSTRIX)  CPT-4  69314  2016

 

 VENIPUNCT, ROUTINE*  CPT-4  59622  2016



                                                                               
                                                                     



Vital Signs

          





 Date/Time:  2016

 

 Cardiac Monitoring Heart Rate  100 bpm

 

 Weight  276 lbs

 

 Height  71 in

 

 BMI  38.494 Index

 

 Blood Pressure Diastolic  70 mmHg

 

 Blood Pressure Systolic  124 mmHg



                                                                    



Results

          





 Name  Result  Date  Reference Range  Unit  Abnormality Flag

 

 LDH               

 

 ----LDH  160  [id number]  119-226   IU/L   

 

 URIC ACID, SERUM               

 

 ----Uric Acid, Serum  5.0  38818839  2.5-7.1   mg/dL   

 

 UA OB DIP (IN HOUSE)               

 

 ----Glucose  negative  2016         

 

 ----Protein  trace  81967618         

 

 ROUTINE VENIPUNCTURE               

 

 CMP               

 

 ----BUN/Creatinine Ratio  12  2016  8-20       

 

 ----eGFR If Africn Am  147  09896316      >59   mL/min/1.73   

 

 ----eGFR If NonAfricn Am  128  18926849      >59   mL/min/1.73   

 

 ----Creatinine, Serum  0.58  03263637  0.57-1.00   mg/dL   

 

 ----Chloride, Serum  103  2016     mmol/L   

 

 ----Potassium, Serum  4.3  58246752  3.5-5.2   mmol/L   

 

 ----Sodium, Serum  140  35558528  136-144   mmol/L   

 

 ----Protein, Total, Serum  6.0  33662034  6.0-8.5   g/dL   

 

 ----Albumin, Serum  2.9  17860262  3.5-5.5   g/dL  L

 

 ----Globulin, Total  3.1  39687451  1.5-4.5   g/dL   

 

 ----A/G Ratio  0.9  2016  1.1-2.5      L

 

 ----BUN  7  2016  6-20   mg/dL   

 

 ----Glucose, Serum  85  93434231  65-99   mg/dL   

 

 ----Carbon Dioxide, Total  22  2016  18-29   mmol/L   

 

 ----Calcium, Serum  9.6  44325245  8.7-10.2   mg/dL   

 

 ----AST (SGOT)  15  2016  0-40   IU/L   

 

 ----ALT (SGPT)  16  2016  0-32   IU/L   

 

 ----Bilirubin, Total  0.2  35412164  0.0-1.2   mg/dL   

 

 ----Alkaline Phosphatase, S  140  2016     IU/L  H

 

 Ultrasound : OB, Follow-up               

 

 URINE PROTEIN TO CREATININE RATIO               

 

 ----Protein/Creat Ratio  198  49932643  0-200   mg/g creat   

 

 ----Protein,Total,Urine  33.8  69940355  Not Estab.   mg/dL   

 

 ----Creatinine, Urine  170.5  82582804  Not Estab.   mg/dL   

 

 CBC               

 

 ----Hemoglobin  13.2  28005813  11.1-15.9   g/dL   

 

 ----RBC  4.24  54397161  3.77-5.28   x10E6/uL   

 

 ----MCV  93  89368728  79-97   fL   

 

 ----Hematocrit  39.5  28455547  34.0-46.6   %   

 

 ----MCHC  33.4  10819834  31.5-35.7   g/dL   

 

 ----MCH  31.1  02087947  26.6-33.0   pg   

 

 ----Platelets  215  36325691  150-379   x10E3/uL   

 

 ----RDW  13.1  46627927  12.3-15.4   %   

 

 ----Neutrophils  68  08337539     %   

 

 ----Monocytes  7  90816811     %   

 

 ----Lymphs  23  89217851     %   

 

 ----Basos  0  59872314     %   

 

 ----Eos  2  38293439     %   

 

 ----Immature Grans (Abs)  0.0  56909068  0.0-0.1   x10E3/uL   

 

 ----Lymphs (Absolute)  2.6  04026891  0.7-3.1   x10E3/uL   

 

 ----WBC  11.3  91611998  3.4-10.8   x10E3/uL  H

 

 ----Immature Granulocytes  0  72057461     %   

 

 ----Neutrophils (Absolute)  7.6  54128328  1.4-7.0   x10E3/uL  H

 

 ----Baso (Absolute)  0.0  31450476  0.0-0.2   x10E3/uL   

 

 ----Monocytes(Absolute)  0.8  47997693  0.1-0.9   x10E3/uL   

 

 ----Eos (Absolute)  0.3  37396550  0.0-0.4   x10E3/uL   



                                                                               
                                                                               



Immunizations

          





 Vaccine  Administration Date

 

 TDAP (BOOSTRIX)  2016



                                                                    



Summary Purpose

          eClinicalWorks Submission

## 2017-10-30 NOTE — XMS REPORT
[facility]

 Created on: 2017



Jr Rico

External Reference #: [id number]

: 1990

Sex: Female



Demographics







 Address  [address] [place]  [address]

 

 Preferred Language  Unknown

 

 Marital Status  Unknown

 

 Adventist Affiliation  Unknown

 

 Race  Unknown

 

 Ethnic Group  Unknown





Author







 Author  CHUN  CLAUDY

 

 [facility]

 

 Address  [address] [place]  [address]



 

 Phone  [phone]







Care Team Providers







 Care Team Member Name  Role  Phone

 

 CHUNEUFEMIA LANDEROSHANY  Unavailable  [phone]







PROBLEMS







 Type  Condition  ICD9-CM Code  HSY77-DF Code  Onset Dates  Condition Status  
SNOMED Code

 

 Problem  Anxiety, generalized     F41.1     Active  72671136

 

 Problem  Generalized anxiety disorder     F41.1     Active  85299595

 

 Problem  Postpartum depression     F53     Active  64904045

 

 Problem  Patient is a currently breast-feeding mother     Z39.1     Active  
342611162

 

 Problem  History of gestational hypertension     Z87.59     Active  052714367







ALLERGIES

Unknown Allergies



SOCIAL HISTORY

No smoking Hx information available



PLAN OF CARE





VITAL SIGNS







 Height  71 in  2016

 

 Weight  288.0 lbs  2016

 

 Temperature  98.0 degrees Fahrenheit  2016

 

 BMI  40.168 kg/m2  2016

 

 Blood pressure systolic  140 mmHg  2016

 

 Blood pressure diastolic  76 mmHg  2016







MEDICATIONS







 Medication  Instructions  Dosage  Frequency  Start Date  End Date  Duration  
Status

 

 Prenatal                    Active







RESULTS







 Name  Result  Date  Reference Range

 

 UA OB DIP (IN HOUSE)     2016   

 

 Glucose  negative      

 

 Protein  trace      

 

 LDH     2016   

 

 LDH  170     119-226

 

 URIC ACID, SERUM     2016   

 

 Uric Acid, Serum  5.8     2.5-7.1

 

 URINE PROTEIN TO CREATININE RATIO     2016   

 

 Creatinine, Urine  218.9     Not Estab.

 

 Protein,Total,Urine  47.9     Not Estab.

 

 Protein/Creat Ratio  219     0-200

 

 CBC     2016   

 

 WBC  15.4     3.4-10.8

 

 RBC  4.19     3.77-5.28

 

 Hemoglobin  12.7     11.1-15.9

 

 Hematocrit  38.0     34.0-46.6

 

 MCV  91     79-97

 

 MCH  30.3     26.6-33.0

 

 MCHC  33.4     31.5-35.7

 

 RDW  12.3     12.3-15.4

 

 Platelets  178     150-379

 

 Neutrophils  61      

 

 Lymphs  31      

 

 Monocytes  7      

 

 Eos  1      

 

 Basos  0      

 

 Neutrophils (Absolute)  9.3     1.4-7.0

 

 Lymphs (Absolute)  4.7     0.7-3.1

 

 Monocytes(Absolute)  1.1     0.1-0.9

 

 Eos (Absolute)  0.1     0.0-0.4

 

 Baso (Absolute)  0.0     0.0-0.2

 

 Immature Granulocytes  0      

 

 Immature Grans (Abs)  0.0     0.0-0.1

 

 CMP     2016   

 

 Glucose, Serum  98     65-99

 

 BUN  9     6-20

 

 Creatinine, Serum  0.60     0.57-1.00

 

 eGFR If NonAfricn Am  126         >59

 

 eGFR If Africn Am  145         >59

 

 BUN/Creatinine Ratio  15     8-20

 

 Sodium, Serum  142     134-144

 

 Potassium, Serum  3.8     3.5-5.2

 

 Chloride, Serum  106     

 

 Carbon Dioxide, Total  19     18-29

 

 Calcium, Serum  8.9     8.7-10.2

 

 Protein, Total, Serum  5.9     6.0-8.5

 

 Albumin, Serum  3.5     3.5-5.5

 

 Globulin, Total  2.4     1.5-4.5

 

 A/G Ratio  1.5     1.1-2.5

 

 Bilirubin, Total  <0.2     0.0-1.2

 

 Alkaline Phosphatase, S  215     

 

 AST (SGOT)  13     0-40

 

 ALT (SGPT)  11     0-32







PROCEDURES







 Procedure  Date Ordered  Related Diagnosis  Body Site

 

 URINE-NO MICRO  Dec 14, 2016      

 

 [facility]  Dec 14, 2016      

 

 VENIPUNCT, ROUTINE*  Dec 14, 2016      

 

 Office Visit, Est Pt., Level 3  Dec 14, 2016      







IMMUNIZATIONS

No Known Immunizations

## 2017-10-30 NOTE — XMS REPORT
[facility]

 Created on: 10/19/2016



Nael Ricokattyguero

External Reference #: [id number]

: 1990

Sex: Female



Demographics







 Address  [address] [place]  [address]

 

 Home Phone  [phone]

 

 Preferred Language  Unknown

 

 Marital Status  Unknown

 

 Islam Affiliation  Unknown

 

 Race  White

 

 Ethnic Group  Not  or 





Author







 Author  CLAUDY MCCOLLUM  eClinicalWorks

 

 Address  Unknown

 

 Phone  Unavailable







Care Team Providers







 Care Team Member Name  Role  Phone

 

 CLAUDY MCCOLLUM  CP  Unavailable



                                                                



Allergies, Adverse Reactions, Alerts

          





 Substance  Reaction  Event Type

 

 N.K.D.A.  Info Not Available  Non Drug Allergy



                                                                               
         



Problems

          





 Problem Type  Condition  Code  Onset Dates  Condition Status

 

 Assessment  Unspecified abdominal pain  R10.9     Active

 

 Assessment  Proteinuria affecting pregnancy in third trimester  O12.13     
Active

 

 Assessment  Third trimester bleeding  O46.93     Active

 

 Assessment  28 weeks gestation of pregnancy  Z3A.28     Active

 

 Assessment  Other specified pregnancy related conditions, unspecified 
trimester  O26.899     Active

 

 Problem  Third trimester bleeding  O46.93     Active

 

 Problem  Partial placenta previa, second trimester  O44.12     Active

 

 Problem  Proteinuria affecting pregnancy in third trimester  O12.13     Active

 

 Problem  Abnormal quad screen  O28.0     Active

 

 Problem  Elevated blood pressure affecting pregnancy in first trimester, 
antepartum  O13.1     Active

 

 Problem  Proteinuria affecting pregnancy in second trimester  O12.12     Active

 

 Problem  Hypertension affecting pregnancy in second trimester  O16.2     Active



                                                                               
                                                                               
                                        



Medications

          





 Medication  Code System  Code  Instructions  Start Date  End Date  Status  
Dosage

 

 Prenatal  NDC  [address]79[address]               not defined



                                                                               
         



Procedures

          





 Procedure  Coding System  Code  Date

 

 LAB NOT BILLED BY [facility]  CPT-4  NOBLL  Oct 18, 2016

 

 No Charge  CPT-4  91829  Oct 18, 2016

 

 URINALYSIS, AUTO, W/O SCOPE  CPT-4  97168  Oct 18, 2016

 

 Office Visit, Est Pt., Level 3  CPT-4  45732  Oct 18, 2016

 

 KING VAG, DNA, DIR PROBE  CPT-4  28019  Oct 18, 2016



                                                                               
                                                           



Vital Signs

          





 Date/Time:  Oct 18, 2016

 

 Cardiac Monitoring Heart Rate  96 bpm

 

 Weight  269.8 lbs

 

 Height  71 in

 

 BMI  37.629 Index

 

 Blood Pressure Diastolic  86 mmHg

 

 Blood Pressure Systolic  134 mmHg



                                                                    



Results

          





 Name  Result  Date  Reference Range  Unit  Abnormality Flag

 

 TRICHOMONAS (IN HOUSE)               

 

 ----TRICHOMONAS  negative  2016         

 

 ----Control  +  2016         

 

 ----Lot #  [id number]  2016         

 

 ----Exp date  2016         

 

 BACTERIAL VAGINOSIS (IN HOUSE)               

 

 ----Exp date  2016         

 

 ----Control  +  2016         

 

 ----Lot #  [id number]  2016         

 

 ----RESULTS  negative  2016         



                                                                              



Summary Purpose

          eClinicalWorks Submission

## 2017-10-30 NOTE — XMS REPORT
[facility]

 Created on: 2016



Jr Rico

External Reference #: [id number]

: 1990

Sex: Female



Demographics







 Address  [address] [place]  [address]

 

 Home Phone  [phone]

 

 Preferred Language  Unknown

 

 Marital Status  Unknown

 

 Jehovah's witness Affiliation  Unknown

 

 Race  White

 

 Ethnic Group  Not  or 





Author







 Author  CLAUDY MCCOLLUM  eClinicalWorks

 

 Address  Unknown

 

 Phone  Unavailable







Care Team Providers







 Care Team Member Name  Role  Phone

 

 CLAUDY MCCOLLUM    Unavailable



                                                                



Allergies

          No Known Allergies                                                   
                                     



Problems

          





 Problem Type  Condition  Code  Onset Dates  Condition Status

 

 Problem  Proteinuria affecting pregnancy in second trimester  O12.12     Active

 

 Problem  Hypertension affecting pregnancy in second trimester  O16.2     Active

 

 Problem  Partial placenta previa, second trimester  O44.12     Active

 

 Assessment  Hypertension affecting pregnancy in second trimester  O16.2     
Active

 

 Problem  Abnormal quad screen  O28.0     Active

 

 Problem  Elevated blood pressure affecting pregnancy in first trimester, 
antepartum  O13.1     Active



                                                                               
                                                           



Medications

          No Known Medications                                                 
                             



Results

          No Known Results                                                     
               



Summary Purpose

          eClinicalWorks Submission

## 2017-10-30 NOTE — ED COUGH/URI
General


Chief Complaint:  Cough/Cold/Flu Symptoms


Stated Complaint:  COUGH/SOB/VOMITING


Source:  patient


Exam Limitations:  no limitations





History of Present Illness


Time seen by provider:  21:37


Initial Comments


To ER with a one-month history of a cough that is persistent and only 

occasionally productive.  She reports chills but no fevers.  She has not sought 

care for this prior to tonight.  She does have an associated sore throat and 

rhinorrhea.  She does report posttussive emesis.


Timing/Duration:  other (1 month)


Modifying Factors:  Improves With Coughing


Associated Symptoms:  cough, shortness of breath





Allergies and Home Medications


Allergies


Coded Allergies:  


     No Known Drug Allergies (Unverified , 5/3/12)





Home Medications


Azithromycin 250 Mg Tablet, 250 MG PO UD, #6


   TAKE 2 TABLETS ON DAY ONE THEN TAKE 1 TABLET DAILY FOR FOUR MORE DAYS 


   Prescribed by: LISBETH KEARNEY on 10/30/17 2142


D-Methorphan Hb/P-Epd HCl/Bpm 118 Ml Syrup, 5 ML PO Q4H PRN for COUGH, #120


   Prescribed by: LISBETH KEARNEY on 10/30/17 2142


Hydrocodone/Acetaminophen 1 Each Tablet, 1-2 TAB PO Q4H PRN for PAIN, #30 Ref 0


   Prescribed by: CLAUDY MCCOLLUM on 12/22/16 1000


Nifedipine 60 Mg Tab.er.24, 60 MG PO DAILY, #30 Ref 0


   Prescribed by: CLAUDY MCCOLLUM on 12/22/16 1000


Prenatal Vit/Iron Fumarate/FA 1 Each Tablet, 1 EACH PO DAILY, (Reported)





Constitutional:  see HPI, chills


EENTM:  nose congestion, throat pain


Respiratory:  see HPI, cough


Cardiovascular:  no symptoms reported


Genitourinary:  no symptoms reported


Musculoskeletal:  no symptoms reported


Skin:  no symptoms reported


Psychiatric/Neurological:  No Symptoms Reported


Hematologic/Lymphatic:  No Symptoms Reported





Past Medical-Social-Family Hx


Patient Social History


Type Used:  Cigarettes


Former Smoker, Quit:  Jun 11, 2016


Recent Foreign Travel:  No


Contact w/Someone Who Travel:  No


Recent Hopitalizations:  Yes (Pregnancy related - observation )





Immunizations Up To Date


Tetanus Booster (TDap):  Unknown


PED Vaccines UTD:  No


Date of Influenza Vaccine:  Sep 23, 2016





Seasonal Allergies


Seasonal Allergies:  Yes





Reproductive System


Hx Reproductive Disorders:  No


Sexually Transmitted Disease:  No


HIV/AIDS:  No





Psychosocial


Behavioral Health Disorders:  Anxiety, Bipolar, Depression





Blood Transfusions


Adverse Reaction to a Blood Tr:  No





Family Medical History


Significant Family History:  No Pertinent Family Hx


Family Medial History:  


Asthma


  19 MOTHER


  G8 SISTER


  Maternal grandmother


Colon cancer


  Maternal grandfather


  Paternal grandmother


Diabetes mellitus


  Maternal grandfather


Hypertension


  Maternal grandfather


  Maternal grandmother





Physical Exam


Vital Signs





Vital Sign - Last 12Hours








 10/30/17





 21:29


 


Temp 98.0


 


Pulse 97


 


Resp 20


 


B/P (MAP) 140/104


 


Pulse Ox 99


 


O2 Delivery Room Air





Capillary Refill :


General Appearance:  WD/WN, no apparent distress


Eyes:  Bilateral Eye Normal Inspection, Bilateral Eye PERRL, Bilateral Eye EOMI


HEENT:  PERRL/EOMI, normal ENT inspection


Neck:  non-tender, full range of motion


Respiratory:  normal breath sounds, no respiratory distress, no accessory 

muscle use, No decreased breath sounds, No crackles, No rales, No rhonchi, No 

stridor, No wheezing


Cardiovascular:  regular rate, rhythm, no murmur


Gastrointestinal:  normal bowel sounds, non tender, soft


Extremities:  normal range of motion, non-tender


Neurologic/Psychiatric:  alert, normal mood/affect, oriented x 3


Skin:  normal color, warm/dry





Progress/Results/Core Measures


Results/Orders


My Orders





Orders - LISBETH KEARNEY


Chest Pa/Lat (2 View) (10/30/17 21:34)


Dexamethasone Injection (Decadron Inject (10/30/17 21:45)





Medications Given in ED





Current Medications








 Medications  Dose


 Ordered  Sig/Paula


 Route  Start Time


 Stop Time Status Last Admin


Dose Admin


 


 Dexamethasone


 Sodium Phosphate  10 mg  ONCE  ONCE


 IM  10/30/17 21:45


 10/30/17 21:46 DC 10/30/17 21:48


10 MG








Vital Signs/I&O





Vital Sign - Last 12Hours








 10/30/17





 21:29


 


Temp 98.0


 


Pulse 97


 


Resp 20


 


B/P (MAP) 140/104


 


Pulse Ox 99


 


O2 Delivery Room Air











Departure


Impression


Impression:  


 Primary Impression:  


 Bronchitis


Disposition:  01 HOME, SELF-CARE


Condition:  Stable





Departure-Patient Inst.


Decision time for Depature:  21:41


Referrals:  


CLAUDY MCCOLLUM MD (PCP/Family)


Primary Care Physician


Patient Instructions:  Acute Bronchitis, Adult (DC)





Add. Discharge Instructions:  


1.  Follow-up to  next week


2.  Return to ER for any concerns


3.  All discharge instructions reviewed with patient and/or family. Voiced 

understanding.


Scripts


Azithromycin (Azithromycin) 250 Mg Tablet


250 MG PO UD, #6 TAB


   TAKE 2 TABLETS ON DAY ONE THEN TAKE 1 TABLET DAILY FOR FOUR MORE DAYS


   Prov: LISBETH KEARNEY         10/30/17 


D-Methorphan Hb/P-Epd HCl/Bpm (Bromfed Dm Cough Syrup) 118 Ml Syrup


5 ML PO Q4H Y for COUGH, #120 ML


   Prov: LISBETH KEARNEY         10/30/17


Work/School Note:  Family Work Note   Patient Received Medical Care In the 

Emergency Department On:  Oct 30, 2017


   Patient Will Be Able to Return to Work/School On:  Oct 31, 2017











LISBETH KEARNEY Oct 30, 2017 21:38
normal...

## 2017-10-30 NOTE — XMS REPORT
[facility]

 Created on: 09/10/2017



Jr Rico

External Reference #: [id number]

: 1990

Sex: Female



Demographics







 Address  [address] [place]  [address]

 

 Preferred Language  Unknown

 

 Marital Status  Unknown

 

 [employer]ian Affiliation  Unknown

 

 Race  Unknown

 

 Ethnic Group  Unknown





Author







 Author  ANIKA MARADIAGA

 

 [facility]

 

 Address  [address] [place]  [address]



 

 Phone  [phone]







Care Team Providers







 Care Team Member Name  Role  Phone

 

 ANIKA MARADIAGA  Unavailable  [phone]







PROBLEMS







 Type  Condition  ICD9-CM Code  UAM52-EF Code  Onset Dates  Condition Status  
SNOMED Code

 

 Problem  Generalized anxiety disorder     F41.1     Active  23849024

 

 Problem  Anxiety, generalized     F41.1     Active  68272944

 

 Problem  Postpartum depression     F53     Active  69812037

 

 Problem  Patient is a currently breast-feeding mother     Z39.1     Active  
[id number]

 

 Problem  History of gestational hypertension     Z87.59     Active  [id number]







ALLERGIES

Unknown Allergies



SOCIAL HISTORY

No smoking Hx information available



PLAN OF CARE







 Activity  Details









  









 Follow Up  1 Week Reason:Depression







VITAL SIGNS





MEDICATIONS

Unknown Medications



RESULTS

No Results



PROCEDURES







 Procedure  Date Ordered  Related Diagnosis  Body Site

 

 Psychotherapy, patient &/family, 30 minutes, established patient  2017
      







IMMUNIZATIONS

No Known Immunizations

## 2017-10-30 NOTE — XMS REPORT
[facility]

 Created on: 2016



Rico  Arashkatarzynachavez

External Reference #: [id number]

: 1990

Sex: Female



Demographics







 Address  [address] [place]  [address]

 

 Home Phone  [phone]

 

 Preferred Language  Unknown

 

 Marital Status  Unknown

 

 Synagogue Affiliation  Unknown

 

 Race  White

 

 Ethnic Group  Not  or 





Author







 Author  CLAUDY MCCOLLUM  e[employer]

 

 Address  Unknown

 

 Phone  Unavailable







Care Team Providers







 Care Team Member Name  Role  Phone

 

 CLAUDY MCCOLLUM  CP  Unavailable



                                                                



Allergies, Adverse Reactions, Alerts

          





 Substance  Reaction  Event Type

 

 N.K.D.A.  Info Not Available  Non Drug Allergy



                                                                               
         



Problems

          





 Problem Type  Condition  Code  Onset Dates  Condition Status

 

 Assessment  33 weeks gestation of pregnancy  Z3A.33     Active

 

 Problem  Elevated blood pressure affecting pregnancy in first trimester, 
antepartum  O13.1     Active

 

 Assessment  Proteinuria affecting pregnancy in third trimester  O12.13     
Active

 

 Assessment  Chronic hypertension affecting pregnancy  O10.919     Active

 

 Problem  Proteinuria affecting pregnancy in third trimester  O12.13     Active

 

 Problem  Third trimester bleeding  O46.93     Active

 

 Problem  Chronic hypertension affecting pregnancy  O10.919     Active

 

 Problem  Hypertension affecting pregnancy in second trimester  O16.2     Active

 

 Problem  Abnormal quad screen  O28.0     Active

 

 Problem  Partial placenta previa, second trimester  O44.12     Active

 

 Problem  Proteinuria affecting pregnancy in second trimester  O12.12     Active



                                                                               
                                                                               
                              



Medications

          





 Medication  Code System  Code  Instructions  Start Date  End Date  Status  
Dosage

 

 Prenatal  NDC  [address]71-[address]               not defined



                                                                               
         



Procedures

          





 Procedure  Coding System  Code  Date

 

 LAB NOT BILLED BY [facility]  CPT-4  NOBLL  2016

 

 Office Visit, Est Pt., Level 3  CPT-4  75542  2016

 

 URINE-NO MICRO  CPT-4  59479  2016

 

 VENIPUNCT, ROUTINE*  CPT-4  35119  2016



                                                                               
                                                 



Vital Signs

          





 Date/Time:  2016

 

 Cardiac Monitoring Heart Rate  102 bpm

 

 Weight  271.6 lbs

 

 Height  71 in

 

 BMI  37.881 Index

 

 Blood Pressure Diastolic  80 mmHg

 

 Blood Pressure Systolic  140 mmHg



                                                                    



Results

          





 Name  Result  Date  Reference Range  Unit  Abnormality Flag

 

 LDH               

 

 ----LDH  159  2016  119-226   IU/L   

 

 URIC ACID, SERUM               

 

 ----Uric Acid, Serum  5.3  2016  2.5-7.1   mg/dL   

 

 UA OB DIP (IN HOUSE)               

 

 ----Glucose  negative  2016         

 

 ----Protein  1+  2016         

 

 ROUTINE VENIPUNCTURE               

 

 CMP               

 

 ----BUN/Creatinine Ratio  14  2016  8-20       

 

 ----eGFR If Africn Am  147  [id number]      >59   mL/min/1.73   

 

 ----eGFR If NonAfricn Am  128  [id number]      >59   mL/min/1.73   

 

 ----Creatinine, Serum  0.58  2016  0.57-1.00   mg/dL   

 

 ----Chloride, Serum  104  2016     mmol/L   

 

 ----Potassium, Serum  4.6  2016  3.5-5.2   mmol/L   

 

 ----Sodium, Serum  141  2016  136-144   mmol/L   

 

 ----Protein, Total, Serum  5.9  2016  6.0-8.5   g/dL  L

 

 ----Albumin, Serum  3.3  2016  3.5-5.5   g/dL  L

 

 ----Globulin, Total  2.6  2016  1.5-4.5   g/dL   

 

 ----A/G Ratio  1.3  2016  1.1-2.5       

 

 ----BUN  8  2016  6-20   mg/dL   

 

 ----Glucose, Serum  97  2016  65-99   mg/dL   

 

 ----Carbon Dioxide, Total  20  2016  18-29   mmol/L   

 

 ----Calcium, Serum  9.3  2016  8.7-10.2   mg/dL   

 

 ----AST (SGOT)  18  2016  0-40   IU/L   

 

 ----ALT (SGPT)  16  2016  0-32   IU/L   

 

 ----Bilirubin, Total  0.2  2016  0.0-1.2   mg/dL   

 

 ----Alkaline Phosphatase, S  167  2016     IU/L  H

 

 URINE PROTEIN TO CREATININE RATIO               

 

 ----Protein/Creat Ratio  179  2016  0-200   mg/g creat   

 

 ----Protein,Total,Urine  32.9  2016  Not Estab.   mg/dL   

 

 ----Creatinine, Urine  183.4  2016  Not Estab.   mg/dL   

 

 CBC               

 

 ----Hemoglobin  13.0  2016  11.1-15.9   g/dL   

 

 ----RBC  4.16  2016  3.77-5.28   x10E6/uL   

 

 ----MCV  94  2016  79-97   fL   

 

 ----Hematocrit  39.0  2016  34.0-46.6   %   

 

 ----MCHC  33.3  2016  31.5-35.7   g/dL   

 

 ----MCH  31.3  2016  26.6-33.0   pg   

 

 ----Platelets  199  2016  150-379   x10E3/uL   

 

 ----RDW  13.3  2016  12.3-15.4   %   

 

 ----Neutrophils  66  39393016     %   

 

 ----Monocytes  8  47711092     %   

 

 ----Lymphs  24  40762242     %   

 

 ----Basos  0  88489455     %   

 

 ----Eos  2  2016     %   

 

 ----Immature Grans (Abs)  0.0  2016  0.0-0.1   x10E3/uL   

 

 ----Lymphs (Absolute)  2.9  [id number]  0.7-3.1   x10E3/uL   

 

 ----WBC  12.3  [id number]  3.4-10.8   x10E3/uL  H

 

 ----Immature Granulocytes  0  59392481     %   

 

 ----Neutrophils (Absolute)  8.1  [id number]  1.4-7.0   x10E3/uL  H

 

 ----Baso (Absolute)  0.0  [id number]  0.0-0.2   x10E3/uL   

 

 ----Monocytes(Absolute)  1.0  [id number]  0.1-0.9   x10E3/uL  H

 

 ----Eos (Absolute)  0.2  [id number]  0.0-0.4   x10E3/uL   



                                                                               
                                                 



Summary Purpose

          eClinicalWorks Submission

## 2017-10-30 NOTE — XMS REPORT
[facility]

 Created on: 10/14/2016



Jr Rico

External Reference #: [id number]

: 1990

Sex: Female



Demographics







 Address  [address] [place]  [address]

 

 Home Phone  [phone]

 

 Preferred Language  Unknown

 

 Marital Status  Unknown

 

 Yazidi Affiliation  Unknown

 

 Race  White

 

 Ethnic Group  Not  or 





Author







 Author  CLAUDY MCCOLLUM

 

 [facility]  [employer]

 

 Address  Unknown

 

 Phone  Unavailable







Care Team Providers







 Care Team Member Name  Role  Phone

 

 CLAUDY MCCOLLUM    Unavailable



                                                                



Allergies

          No Known Allergies                                                   
                                     



Problems

          





 Problem Type  Condition  Code  Onset Dates  Condition Status

 

 Problem  Proteinuria affecting pregnancy in second trimester  O12.12     Active

 

 Problem  Hypertension affecting pregnancy in second trimester  O16.2     Active

 

 Problem  Partial placenta previa, second trimester  O44.12     Active

 

 Problem  Abnormal quad screen  O28.0     Active

 

 Problem  Elevated blood pressure affecting pregnancy in first trimester, 
antepartum  O13.1     Active



                                                                               
                                                 



Medications

          No Known Medications                                                 
                                                 



Vital Signs

          





 Date/Time:  Oct 13, 2016

 

 Blood Pressure Systolic  138 mmHg

 

 Cardiac Monitoring Heart Rate  88 bpm

 

 Height  71 in

 

 Blood Pressure Diastolic  86 mmHg



                                                                              



Results

          No Known Results                                                     
               



Summary Purpose

          eClinicalWorks Submission

## 2017-10-30 NOTE — XMS REPORT
[facility]

 Created on: 2017



Jr Rico

External Reference #: [id number]

: 1990

Sex: Female



Demographics







 Address  [address] [place]  [address]

 

 Preferred Language  Unknown

 

 Marital Status  Unknown

 

 Scientologist Affiliation  Unknown

 

 Race  Unknown

 

 Ethnic Group  Unknown





Author







 Author  CLAUDY MCCOLLUM

 

 [facility]

 

 Address  [address] [place]  [address]



 

 Phone  [phone]







Care Team Providers







 Care Team Member Name  Role  Phone

 

 CLAUDY MCCOLLUM  Unavailable  [phone]







PROBLEMS







 Type  Condition  ICD9-CM Code  MNU56-DM Code  Onset Dates  Condition Status  
SNOMED Code

 

 Problem  Generalized anxiety disorder     F41.1     Active  57466238

 

 Problem  Anxiety, generalized     F41.1     Active  75644556

 

 Problem  Postpartum depression     F53     Active  17830781

 

 Problem  Patient is a currently breast-feeding mother     Z39.1     Active  
349252523

 

 Problem  History of gestational hypertension     Z87.59     Active  196135464







ALLERGIES

Unknown Allergies



SOCIAL HISTORY

No smoking Hx information available



PLAN OF CARE





VITAL SIGNS





MEDICATIONS







 Medication  Instructions  Dosage  Frequency  Start Date  End Date  Duration  
Status

 

 NIFEdipine ER 60 mg  Orally Once a day  1 tablet on an empty stomach  24h          Active







RESULTS

No Results



PROCEDURES

No Known procedures



IMMUNIZATIONS

No Known Immunizations

## 2017-10-30 NOTE — XMS REPORT
[facility]

 Created on: 2017



Jr Rico

External Reference #: [id number]

: 1990

Sex: Female



Demographics







 Address  [address] [place]  [address]

 

 Preferred Language  Unknown

 

 Marital Status  Unknown

 

 Baptism Affiliation  Unknown

 

 Race  Unknown

 

 Ethnic Group  Unknown





Author







 Author  ANIKA MARADIAGA

 

 [facility]

 

 Address  [address] [place]  [address]



 

 Phone  [phone]







Care Team Providers







 Care Team Member Name  Role  Phone

 

 ANIKA MARADIAGA  Unavailable  [phone]







PROBLEMS







 Type  Condition  ICD9-CM Code  CIM09-BH Code  Onset Dates  Condition Status  
SNOMED Code

 

 Problem  Generalized anxiety disorder     F41.1     Active  23575134

 

 Problem  Anxiety, generalized     F41.1     Active  13025977

 

 Problem  Postpartum depression     F53     Active  20585601

 

 Problem  Patient is a currently breast-feeding mother     Z39.1     Active  
[id number]

 

 Problem  History of gestational hypertension     Z87.59     Active  930347749







ALLERGIES

Unknown Allergies



SOCIAL HISTORY

No smoking Hx information available



PLAN OF CARE





VITAL SIGNS





MEDICATIONS

Unknown Medications



RESULTS

No Results



PROCEDURES

No Known procedures



IMMUNIZATIONS

No Known Immunizations

## 2017-10-30 NOTE — XMS REPORT
[facility]

 Created on: 2017



Jr Rico

External Reference #: [id number]

: 1990

Sex: Female



Demographics







 Address  [address] [place]  [address]

 

 Preferred Language  Unknown

 

 Marital Status  Unknown

 

 Confucianism Affiliation  Unknown

 

 Race  Unknown

 

 Ethnic Group  Unknown





Author







 Author  ANIKA MARADIAGA

 

 [facility]

 

 Address  [address] [place]  [address]



 

 Phone  [phone]







Care Team Providers







 Care Team Member Name  Role  Phone

 

 ANIKA MARADIAGA  Unavailable  [phone]







PROBLEMS







 Type  Condition  ICD9-CM Code  EOK71-GI Code  Onset Dates  Condition Status  
SNOMED Code

 

 Problem  Generalized anxiety disorder     F41.1     Active  44844160

 

 Problem  Anxiety, generalized     F41.1     Active  07634863

 

 Problem  Postpartum depression     F53     Active  64462926

 

 Problem  Patient is a currently breast-feeding mother     Z39.1     Active  
600004010

 

 Problem  History of gestational hypertension     Z87.59     Active  130846337







ALLERGIES

Unknown Allergies



SOCIAL HISTORY

No smoking Hx information available



PLAN OF CARE







 Activity  Details









  









 Follow Up  1 Week Reason:Depression







VITAL SIGNS





MEDICATIONS

Unknown Medications



RESULTS

No Results



PROCEDURES







 Procedure  Date Ordered  Related Diagnosis  Body Site

 

 Psychotherapy, patient &/family, 30 minutes, established patient  2017
      







IMMUNIZATIONS

No Known Immunizations

## 2017-10-30 NOTE — DIAGNOSTIC IMAGING REPORT
INDICATION: Cough and congestion.



COMPARISON: 2/29/2016.



FINDINGS: PA and lateral view show lungs to be well-aerated and

clear. Heart is not enlarged. There is no pulmonary edema. No

pneumothorax or pleural effusion.



IMPRESSION: Normal PA and lateral chest.



Dictated by: 



  Dictated on workstation # [id number]

## 2018-11-07 ENCOUNTER — HOSPITAL ENCOUNTER (EMERGENCY)
Dept: HOSPITAL 75 - ER | Age: 28
Discharge: HOME | End: 2018-11-07
Payer: SELF-PAY

## 2018-11-07 VITALS — BODY MASS INDEX: 33.59 KG/M2 | WEIGHT: 248 LBS | HEIGHT: 72 IN

## 2018-11-07 VITALS — DIASTOLIC BLOOD PRESSURE: 80 MMHG | SYSTOLIC BLOOD PRESSURE: 129 MMHG

## 2018-11-07 DIAGNOSIS — Z80.0: ICD-10-CM

## 2018-11-07 DIAGNOSIS — F41.9: ICD-10-CM

## 2018-11-07 DIAGNOSIS — J06.9: Primary | ICD-10-CM

## 2018-11-07 DIAGNOSIS — F31.9: ICD-10-CM

## 2018-11-07 DIAGNOSIS — F17.210: ICD-10-CM

## 2018-11-07 DIAGNOSIS — N39.0: ICD-10-CM

## 2018-11-07 LAB
ALBUMIN SERPL-MCNC: 4.5 GM/DL (ref 3.2–4.5)
ALP SERPL-CCNC: 79 U/L (ref 40–136)
ALT SERPL-CCNC: 41 U/L (ref 0–55)
APTT PPP: YELLOW S
BACTERIA #/AREA URNS HPF: (no result) /HPF
BASOPHILS # BLD AUTO: 0 10^3/UL (ref 0–0.1)
BASOPHILS NFR BLD AUTO: 0 % (ref 0–10)
BILIRUB SERPL-MCNC: 1.1 MG/DL (ref 0.1–1)
BILIRUB UR QL STRIP: NEGATIVE
BUN/CREAT SERPL: 12
CALCIUM SERPL-MCNC: 9.8 MG/DL (ref 8.5–10.1)
CHLORIDE SERPL-SCNC: 107 MMOL/L (ref 98–107)
CO2 SERPL-SCNC: 24 MMOL/L (ref 21–32)
CREAT SERPL-MCNC: 0.75 MG/DL (ref 0.6–1.3)
EOSINOPHIL # BLD AUTO: 0.2 10^3/UL (ref 0–0.3)
EOSINOPHIL NFR BLD AUTO: 2 % (ref 0–10)
ERYTHROCYTE [DISTWIDTH] IN BLOOD BY AUTOMATED COUNT: 13.1 % (ref 10–14.5)
FIBRINOGEN PPP-MCNC: CLEAR MG/DL
GFR SERPLBLD BASED ON 1.73 SQ M-ARVRAT: > 60 ML/MIN
GLUCOSE SERPL-MCNC: 93 MG/DL (ref 70–105)
GLUCOSE UR STRIP-MCNC: NEGATIVE MG/DL
HCT VFR BLD CALC: 48 % (ref 35–52)
HGB BLD-MCNC: 16.8 G/DL (ref 11.5–16)
KETONES UR QL STRIP: NEGATIVE
LEUKOCYTE ESTERASE UR QL STRIP: (no result)
LYMPHOCYTES # BLD AUTO: 2.7 X 10^3 (ref 1–4)
LYMPHOCYTES NFR BLD AUTO: 25 % (ref 12–44)
MANUAL DIFFERENTIAL PERFORMED BLD QL: NO
MCH RBC QN AUTO: 31 PG (ref 25–34)
MCHC RBC AUTO-ENTMCNC: 35 G/DL (ref 32–36)
MCV RBC AUTO: 88 FL (ref 80–99)
MONOCYTES # BLD AUTO: 0.7 X 10^3 (ref 0–1)
MONOCYTES NFR BLD AUTO: 7 % (ref 0–12)
NEUTROPHILS # BLD AUTO: 7.2 X 10^3 (ref 1.8–7.8)
NEUTROPHILS NFR BLD AUTO: 67 % (ref 42–75)
NITRITE UR QL STRIP: NEGATIVE
PH UR STRIP: 5 [PH] (ref 5–9)
PLATELET # BLD: 200 10^3/UL (ref 130–400)
PMV BLD AUTO: 11 FL (ref 7.4–10.4)
POTASSIUM SERPL-SCNC: 4.2 MMOL/L (ref 3.6–5)
PROT SERPL-MCNC: 7.6 GM/DL (ref 6.4–8.2)
PROT UR QL STRIP: (no result)
RBC # BLD AUTO: 5.43 10^6/UL (ref 4.35–5.85)
RBC #/AREA URNS HPF: (no result) /HPF
SODIUM SERPL-SCNC: 139 MMOL/L (ref 135–145)
SP GR UR STRIP: 1.02 (ref 1.02–1.02)
UROBILINOGEN UR-MCNC: 1 MG/DL
WBC # BLD AUTO: 10.9 10^3/UL (ref 4.3–11)

## 2018-11-07 PROCEDURE — 87430 STREP A AG IA: CPT

## 2018-11-07 PROCEDURE — 84703 CHORIONIC GONADOTROPIN ASSAY: CPT

## 2018-11-07 PROCEDURE — 36415 COLL VENOUS BLD VENIPUNCTURE: CPT

## 2018-11-07 PROCEDURE — 87804 INFLUENZA ASSAY W/OPTIC: CPT

## 2018-11-07 PROCEDURE — 85025 COMPLETE CBC W/AUTO DIFF WBC: CPT

## 2018-11-07 PROCEDURE — 81000 URINALYSIS NONAUTO W/SCOPE: CPT

## 2018-11-07 PROCEDURE — 87088 URINE BACTERIA CULTURE: CPT

## 2018-11-07 PROCEDURE — 80053 COMPREHEN METABOLIC PANEL: CPT

## 2018-11-07 NOTE — ED COUGH/URI
General


Chief Complaint:  Cough/Cold/Flu Symptoms


Stated Complaint:  VOMITING;COUGH


Nursing Triage Note:  


C/O SEVERE COUGH WITH DIARRHEA FOR THE LAST WEEK. NO FEVER. CHILLS AT TIMES. 


THROAT SORE AT TIMES. C/O LOWER ABD CRAMPING


Source:  patient


Exam Limitations:  no limitations





History of Present Illness


Date Seen by Provider:  Nov 7, 2018


Time Seen by Provider:  11:24


Initial Comments


The patient is a 28 year old female who presents to the emergency room with 

complaints of a cough, diarrhea lower abdominal pain, and a sore throat for the 

the past week. She reports that she works at [employer] (f. [employer]) and was sent home from 

work and was told she needed a work note to return back. Denies fevers, nausea, 

or vomiting.


Timing/Duration:  week


Severity/Quality:  mild


Associated Symptoms:  cough, sore throat





Allergies and Home Medications


Allergies


Coded Allergies:  


     No Known Drug Allergies (Unverified , 5/3/12)





Home Medications


Azithromycin 250 Mg Tablet, 250 MG PO UD


   TAKE 2 TABLETS ON DAY ONE THEN TAKE 1 TABLET DAILY FOR FOUR MORE DAYS 


   Prescribed by: LISBETH KEARNEY on 10/30/17 2142


D-Methorphan Hb/P-Epd HCl/Bpm 118 Ml Syrup, 5 ML PO Q4H PRN for COUGH


   Prescribed by: LISBETH KEARNEY on 10/30/17 2142


Hydrocodone Bit/Acetaminophen 1 Each Tablet, 1-2 TAB PO Q4H PRN for PAIN


   Prescribed by: CLAUDY MCCOLLUM on 12/22/16 1000


Nifedipine 60 Mg Tab.er.24, 60 MG PO DAILY


   Prescribed by: CLAUDY MCCOLLUM on 12/22/16 1000


Nitrofurantoin Monohyd/M-Cryst 100 Mg Capsule, 1 TAB PO BID


   Prescribed by: ERICA MOHAN on 11/7/18 1249


Prenatal Vit/Iron Fumarate/FA 1 Each Tablet, 1 EACH PO DAILY, (Reported)





Patient Home Medication List


Home Medication List Reviewed:  Yes





Review of Systems


Review of Systems


Constitutional:  see HPI, chills; No fever


EENTM:  see HPI, throat pain


Respiratory:  see HPI, cough


Gastrointestinal:  see HPI, abdominal pain, diarrhea





All Other Systems Reviewed


Negative Unless Noted:  Yes





Past Medical-Social-Family Hx


Past Med/Social Hx:  Reviewed Nursing Past Med/Soc Hx


Patient Social History


Alcohol Use:  Denies Use


Recreational Drug Use:  No


Smoking Status:  Current Everyday Smoker


Type Used:  Cigarettes


Former Smoker, Quit:  Jun 11, 2016


Recent Foreign Travel:  No


Contact w/Someone Who Travel:  No


Recent Infectious Disease Expo:  No


Recent Hopitalizations:  No





Immunizations Up To Date


Tetanus Booster (TDap):  Unknown


PED Vaccines UTD:  No


Date of Influenza Vaccine:  Sep 23, 2016





Seasonal Allergies


Seasonal Allergies:  Yes





Past Medical History


Surgeries:  No


Respiratory:  No


Cardiac:  No


Neurological:  No


Reproductive Disorders:  No


Sexually Transmitted Disease:  No


HIV/AIDS:  No


Gastrointestinal:  No


Musculoskeletal:  No


Endocrine:  No


Cancer:  No


Psychosocial:  Yes


Anxiety, Bipolar, Depression


Integumentary:  No


Blood Disorders:  No


Adverse Reaction/Blood Tranf:  No





Family Medical History


Reviewed Nursing Family Hx





Asthma


  19 MOTHER


  G8 SISTER


  Maternal grandmother


Colon cancer


  Maternal grandfather


  Paternal grandmother


Diabetes mellitus


  Maternal grandfather


Hypertension


  Maternal grandfather


  Maternal grandmother


No Pertinent Family Hx





Physical Exam





Vital Signs - First Documented








 11/7/18





 10:47


 


Temp 97.0


 


Pulse 106


 


Resp 18


 


B/P (MAP) 129/87 (101)


 


Pulse Ox 98


 


O2 Delivery Room Air





Capillary Refill : Less Than 3 Seconds


Height: 6'0.00"


Weight: 248lbs. 0.0oz. [id number]; 39.2 BMI


Method:Stated


General Appearance:  WD/WN, no apparent distress


HEENT:  PERRL/EOMI, normal ENT inspection, TMs normal, pharynx normal


Respiratory:  chest non-tender, lungs clear, normal breath sounds, no 

respiratory distress, no accessory muscle use, respiratory distress


Cardiovascular:  normal peripheral pulses, regular rate, rhythm, no edema, no 

gallop, no JVD, no murmur


Gastrointestinal:  normal bowel sounds, non tender, soft, no organomegaly, no 

pulsatile mass


Extremities:  normal capillary refill


Neurologic/Psychiatric:  alert, oriented x 3


Skin:  normal color, warm/dry





Progress/Results/Core Measures


Suspected Sepsis


Recent Fever Within 48 Hours:  No


Infection Criteria Present:  None


New/Unexplained  Altered Menta:  No


Sepsis Screen:  No Definite Risk


SIRS


Temperature:97.0 


Pulse: 106 


Respiratory Rate: 18


 


Laboratory Tests


11/7/18 11:20: White Blood Count 10.9


Blood Pressure 129 /87 


Mean: 101


 





Laboratory Tests


11/7/18 11:20: 


Creatinine 0.75, Platelet Count 200, Total Bilirubin 1.1H








Results/Orders


Lab Results





Laboratory Tests








Test


 11/7/18


11:20 11/7/18


12:11 Range/Units


 


 


White Blood Count


 10.9 


 


 4.3-11.0


10^3/uL


 


Red Blood Count


 5.43 


 


 4.35-5.85


10^6/uL


 


Hemoglobin 16.8 H  11.5-16.0  G/DL


 


Hematocrit 48   35-52  %


 


Mean Corpuscular Volume 88   80-99  FL


 


Mean Corpuscular Hemoglobin 31   25-34  PG


 


Mean Corpuscular Hemoglobin


Concent 35 


 


 32-36  G/DL





 


Red Cell Distribution Width 13.1   10.0-14.5  %


 


Platelet Count


 200 


 


 130-400


10^3/uL


 


Mean Platelet Volume 11.0 H  7.4-10.4  FL


 


Neutrophils (%) (Auto) 67   42-75  %


 


Lymphocytes (%) (Auto) 25   12-44  %


 


Monocytes (%) (Auto) 7   0-12  %


 


Eosinophils (%) (Auto) 2   0-10  %


 


Basophils (%) (Auto) 0   0-10  %


 


Neutrophils # (Auto) 7.2   1.8-7.8  X 10^3


 


Lymphocytes # (Auto) 2.7   1.0-4.0  X 10^3


 


Monocytes # (Auto) 0.7   0.0-1.0  X 10^3


 


Eosinophils # (Auto)


 0.2 


 


 0.0-0.3


10^3/uL


 


Basophils # (Auto)


 0.0 


 


 0.0-0.1


10^3/uL


 


Sodium Level 139   135-145  MMOL/L


 


Potassium Level 4.2   3.6-5.0  MMOL/L


 


Chloride Level 107     MMOL/L


 


Carbon Dioxide Level 24   21-32  MMOL/L


 


Anion Gap 8   5-14  MMOL/L


 


Blood Urea Nitrogen 9   7-18  MG/DL


 


Creatinine


 0.75 


 


 0.60-1.30


MG/DL


 


Estimat Glomerular Filtration


Rate > 60 


 


  





 


BUN/Creatinine Ratio 12    


 


Glucose Level 93     MG/DL


 


Calcium Level 9.8   8.5-10.1  MG/DL


 


Corrected Calcium 9.4   8.5-10.1  MG/DL


 


Total Bilirubin 1.1 H  0.1-1.0  MG/DL


 


Aspartate Amino Transf


(AST/SGOT) 27 


 


 5-34  U/L





 


Alanine Aminotransferase


(ALT/SGPT) 41 


 


 0-55  U/L





 


Alkaline Phosphatase 79     U/L


 


Total Protein 7.6   6.4-8.2  GM/DL


 


Albumin 4.5   3.2-4.5  GM/DL


 


Group A Streptococcus Screen NEGATIVE   NEGATIVE  


 


Urine Color  YELLOW   


 


Urine Clarity  CLEAR   


 


Urine pH  5  5-9  


 


Urine Specific Gravity  1.020  1.016-1.022  


 


Urine Protein  1+ H NEGATIVE  


 


Urine Glucose (UA)  NEGATIVE  NEGATIVE  


 


Urine Ketones  NEGATIVE  NEGATIVE  


 


Urine Nitrite  NEGATIVE  NEGATIVE  


 


Urine Bilirubin  NEGATIVE  NEGATIVE  


 


Urine Urobilinogen  1  NORMAL  MG/DL


 


Urine Leukocyte Esterase  2+ H NEGATIVE  


 


Urine RBC (Auto)  NEGATIVE  NEGATIVE  


 


Urine RBC  NONE   /HPF


 


Urine WBC  10-25 H  /HPF


 


Urine Squamous Epithelial


Cells 


 10-25 H


  /HPF





 


Urine Crystals  NONE   /LPF


 


Urine Bacteria  MODERATE H  /HPF


 


Urine Casts  NONE   /LPF


 


Urine Mucus  MODERATE H  /LPF


 


Urine Culture Indicated  YES   


 


Urine Pregnancy Test  NEGATIVE  NEGATIVE  








Micro Results





Microbiology


11/7/18 Throat Culture - Final, Complete


          No Beta Strep isolated


11/7/18 Influenza Types A,B Antigen (TIM) - Final, Complete


          


11/7/18 Urine Culture - Final, Complete


          See Report





My Orders





Orders - ERICA MOHAN


Influenza A And B Antigens (11/7/18 11:16)


Rapid Strep A Screen (11/7/18 11:16)





Vital Signs/I&O











 11/7/18 11/7/18 11/7/18





 10:47 11:28 13:01


 


Temp 97.0  


 


Pulse 106  87


 


Resp 18  16


 


B/P (MAP) 129/87 (101)  129/80 (96)


 


Pulse Ox 98  98


 


O2 Delivery Room Air Room Air 





Capillary Refill : Less Than 3 Seconds








Blood Pressure Mean:  101











Departure


Impression





 Primary Impression:  


 Upper respiratory infection


 Additional Impression:  


 Urinary tract infection


Disposition:  01 HOME, SELF-CARE


Condition:  Stable/Unchanged





Departure-Patient Inst.


Decision time for Depature:  12:46


Referrals:  


CLAUDY MCCOLLUM MD (PCP/Family)


Primary Care Physician


Patient Instructions:  Cough, Runny Nose, and the Common Cold (DC), Urinary 

Tract Infection, Adult (DC)





Add. Discharge Instructions:  


Take medication as directed. Drink plenty of clear liquids like water Continue 

to use over-the-counter cold cough and flu medications as directed by the 

packaging. You may use over-the-counter antidiarrheals as directed by the 

bottle. Follow-up with her primary care provider within 1 week for recheck. 

Return back to the emergency room for any worsening symptoms or concerns as 

needed. All discharge instructions reviewed with patient and/or family. Voiced 

understanding.


Scripts


Nitrofurantoin Monohyd/M-Cryst (Macrobid 100 mg Capsule) 100 Mg Capsule


1 TAB PO BID for 5 Days, #10 CAP


   Prov: ERICA MOHAN         11/7/18


Work/School Note:  Work Release Form   Date Seen in the Emergency Department:  

Nov 7, 2018


   Return to Work:  Nov 8, 2018


   Restrictions:  No Restrictions











ERICA MOHAN Nov 7, 2018 11:27

## 2018-11-07 NOTE — XMS REPORT
[facility]

 Created on: 2018



Jr Rico

External Reference #: [id number]

: 1990

Sex: Female



Demographics







 Address  [address] [place]  [address]

 

 Preferred Language  Unknown

 

 Marital Status  Unknown

 

 Zoroastrianism Affiliation  Unknown

 

 Race  Unknown

 

 Ethnic Group  Unknown





Author







 Author  MARTHA PARMAR

 

 [facility]

 

 Address  [address] [place]  [address]



 

 Phone  [phone]







Care Team Providers







 Care Team Member Name  Role  Phone

 

 MARTHA PARMAR  Unavailable  [phone]







PROBLEMS







 Type  Condition  ICD9-CM Code  XLD19-PI Code  Onset Dates  Condition Status  
SNOMED Code

 

 Problem  Acute seasonal allergic rhinitis, unspecified trigger     J30.2     
Active  513751638

 

 Problem  Generalized anxiety disorder     F41.1     Active  42513259

 

 Problem  History of gestational hypertension     Z87.59     Active  [id number]

 

 Problem  Postpartum depression     F53     Active  85337489

 

 Problem  Anxiety, generalized     F41.1     Active  80907151

 

 Problem  Patient is a currently breast-feeding mother     Z39.1     Active  
[id number]







ALLERGIES

No Known Allergies



ENCOUNTERS







 Encounter  Location  Date  Diagnosis

 

 [facility]  [id number] [place] [phone]
[phone]    Acute seasonal allergic rhinitis, unspecified trigger 
J30.2 and Bronchitis J40

 

 [facility]  [id number] [place] [address] [phone]-
[phone]  15 Aug, 2017  Encounter for Depo-Provera contraception Z30.42

 

 [place] [id number] [place] [address] 77232-
6112  10 May, 2017  Encounter for Depo-Provera contraception Z30.42

 

 [facility]  [id number] [place] [address] 29626-
5049  10 May, 2017   

 

 [place] [address] [phone]
[phone]    Encounter for Depo-Provera contraception Z30.42 ; History 
of gestational hypertension Z87.59 and Postpartum depression F53

 

 [facility]  [id number] [place] [address] [phone]-
[phone]     

 

 [facility]  [address], [place] [phone]    Postpartum depression F53 ; Patient is a currently breast-
feeding mother Z39.1 and Anxiety, generalized F41.1

 

 [facility]  [address] [address]
9394     

 

 [facility]  [id number] [place] [address] 28017-
6948     

 

 [facility]  [id number] [place] [address] 18178-
4271     

 

 [facility]  [id number] [place] [address] 00960-
3785     

 

 [facility]  [id number] [place] [address] 25879-
8568    Postpartum depression F53 and Anxiety, generalized F41.1

 

 [facility]  [id number] [place] [address] 52198-
9369    Anxiety, generalized F41.1 and Postpartum depression F53

 

 [facility]  [id number] [place] [address] 11252-
7926    Postpartum depression F53 and Generalized anxiety disorder 
F41.1

 

 [facility]  [address] [place] [address] 99414-
4928    Postpartum depression F53

 

 [facility]  [id number] [place] [address] 31404-
3111    Postpartum depression F53 and Patient is a currently breast-
feeding mother Z39.1

 

 [facility]  [address] [place] [address] 21618-
9609  27 Dec, 2016   

 

 [facility] [place] [facility]  [address] [address]
[phone]  24 Dec, 2016   

 

 [facility]  [address] [place] [address] 13737-
8164  23 Dec, 2016  Chronic hypertension affecting pregnancy O10.919

 

 [address] [place] [address] [address]-
5992  23 Dec, 2016   

 

 [place] [id number] [place] [address] 34747-
7560  14 Dec, 2016  Proteinuria affecting pregnancy in third trimester O12.13 ; 
Chronic hypertension affecting pregnancy O10.919 and 36 weeks gestation of 
pregnancy Z3A.36

 

 [place] [address] [place] [address] 89513-
9149  09 Dec, 2016  Elevated blood pressure affecting pregnancy in first 
trimester, antepartum O13.1

 

 [place] [id number] [place] [address] 10671-
4634  07 Dec, 2016  Prenatal care, first pregnancy in third trimester Z34.03 ; 
Chronic hypertension affecting pregnancy O10.919 ; 35 weeks gestation of 
pregnancy Z3A.35 and STD exposure Z20.2

 

 [place] [id number] [place] [address] 83021-
5046    Proteinuria affecting pregnancy in third trimester O12.13 ; 
Chronic hypertension affecting pregnancy O10.919 ; Prenatal care, first 
pregnancy in third trimester Z34.03 and 34 weeks gestation of pregnancy Z3A.34

 

 [address] [address] [place] [address] 10671-
2655     

 

 [address] [address]-
4784    Proteinuria affecting pregnancy in third trimester O12.13 ; 
33 weeks gestation of pregnancy Z3A.33 and Chronic hypertension affecting 
pregnancy O10.919

 

 [address] [address]-
3541     

 

 [place] [id number] [place] [address] 70758-
2202  15 Nov, 2016   

 

 [address] [address]-
0433    Third trimester pregnancy Z33.1 ; 31 weeks gestation of 
pregnancy Z3A.31 ; Abnormal quad screen O28.0 ; Proteinuria affecting pregnancy 
in third trimester O12.13 and Encounter for immunization Z23

 

 [place] [id number] [place] [address] 48614-
7938  21 Oct, 2016   

 

 [place] [id number] [place] [address] 95511-
7533  18 Oct, 2016  Proteinuria affecting pregnancy in third trimester O12.13 ; 
Third trimester bleeding O46.93 ; Unspecified abdominal pain R10.9 ; Other 
specified pregnancy related conditions, unspecified trimester O26.899 and 28 
weeks gestation of pregnancy Z3A.28

 

 [place] [id number] [place] [address] 73567-
5236  13 Oct, 2016   

 

 [place] [id number] [place] [address] 18333-
6094  12 Oct, 2016  Syncope, unspecified syncope type R55 ; Hypertension 
affecting pregnancy in second trimester O16.2 ; Proteinuria affecting pregnancy 
in second trimester O12.12 and 27 weeks gestation of pregnancy Z3A.27

 

 [place] [id number] [place] [address] 96482-
5337  07 Oct, 2016  Diabetes mellitus screening Z13.1 ; Pregnancy, first, 
second trimester Z34.02 and 27 weeks gestation of pregnancy Z3A.27

 

 [address] [place] [address] 63659-
9254  04 Oct, 2016  Hypertension affecting pregnancy in second trimester O16.2 
and Proteinuria affecting pregnancy in second trimester O12.12

 

 [address] [place] [address] 91157-
3638  04 Oct, 2016  Hypertension affecting pregnancy in second trimester O16.2 
and Proteinuria affecting pregnancy in second trimester O12.12

 

 [place] [id number] [place] [address] 43305-
2230  03 Oct, 2016  Proteinuria affecting pregnancy in second trimester O12.12 
and Hypertension affecting pregnancy in second trimester O16.2

 

 [place] [address] [place] [address] 53082-
3290  03 Oct, 2016  Hypertension affecting pregnancy in second trimester O16.2

 

 [address] [place] [address] 37434-
3399  03 Oct, 2016  Hypertension affecting pregnancy in second trimester O16.2

 

 [place] [address] [place] [address] 70246-
5567  23 Sep, 2016  Pregnancy, first, second trimester Z34.02 ; 25 weeks 
gestation of pregnancy Z3A.25 and Encounter for immunization Z23

 

 [place] [address] [place] [address] 01471-
3389  08 Sep, 2016  Hypertension affecting pregnancy in second trimester O16.2 
; Proteinuria affecting pregnancy in second trimester O12.12 and 22 weeks 
gestation of pregnancy Z3A.22

 

 [place] [id number] [place] [address] 53649-
9746  01 Sep, 2016  21 weeks gestation of pregnancy Z3A.21 ; Proteinuria 
affecting pregnancy in second trimester O12.12 ; Hypertension affecting 
pregnancy in second trimester O16.2 and Blurry vision, left eye H53.8

 

 [place] [address] [place] [address] 08720-
6943  29 Aug, 2016  Pregnancy, first, second trimester Z34.02 ; Proteinuria 
affecting pregnancy in third trimester O12.13 ; Hypertension affecting pregnancy
, second trimester O16.2 ; 21 weeks gestation of pregnancy Z3A.21 and Evaluate 
anatomy not seen on prior sonogram Z36

 

 [address] [place] [id number] [place] [address] 34096-
6050  26 Aug, 2016  Pregnancy, first, second trimester Z34.02 ; Hypertension 
affecting pregnancy, second trimester O16.2 ; Proteinuria affecting pregnancy 
in third trimester O12.13 ; 21 weeks gestation of pregnancy Z3A.21 and Evaluate 
anatomy not seen on prior sonogram 36

 

 [place] [id number] [place] [address] 21563-
8265  04 Aug, 2016  Abnormal quad screen O28.0 and 17 weeks gestation of 
pregnancy Z3A.17

 

 [place] [id number] [place] [address] 77892-
4884    Normal pregnancy, first Z34.00 and 17 weeks gestation of 
pregnancy Z3A.17

 

 [facility]  [address] [address]-
4963  24 2016  Normal pregnancy, first Z34.00 and 12 weeks gestation of 
pregnancy Z3A.12

 

 [facility]  [address] [address]-
0824  27 May, 2016  Fetal heart tones not heard O76 ; 20 weeks gestation of 
pregnancy Z3A.20 and Pregnancy, first, second trimester Z34.02

 

 [facility]  [id number] [address]-
1554  18 May, 2016   

 

 [facility]  [id number] [place] [address] 79059-
4865  17 May, 2016  Encounter for pregnancy test, result positive Z32.01

 

 [facility]  [address], [place] [address]
[phone]    Low back pain M54.5 ; Sore throat J02.9 ; Acute right 
lower quadrant pain R10.31 and Cough R05

 

 [facility]  [address] [address]-
9052     

 

 [facility]  [id number] [place] [address] 53441-
9468     

 

 [facility]  [address] [address]-
2039     

 

 [facility]  [address] [place] [address] 96166-
2299     

 

 [facility]  [address] [address]-
5335     

 

 [facility]  [id number] [place] [address] 49374-
8950     

 

 [facility]  [address] [address]-
2589     

 

 [facility]  [id number] [address]-
8460     

 

 [facility]  [address] [address]-
4656  31 Dec, 2014   

 

 [facility]  [address] [address]-
9506  31 Dec, 2014   

 

 [facility]  [address] [address]-
4802  22 Oct, 2013   

 

 [facility]  [address] [address]-
7354  22 Oct, 2013   

 

 [facility]  [address] [place] [address] [phone]-
[phone]  15 Oct, 2013   

 

 [facility]  [address] 6[address]-
6393  15 Oct, 2013   

 

 [facility]  [address] [address]-
0007  08 Oct, 2013   

 

 [facility]  [address] [address]-
6925  05 Oct, 2013   

 

 [facility]  [address] [address]-
9889  04 Oct, 2013   

 

 [facility]  [address] [address]-
4452  02 Oct, 2013   

 

 [facility]  [address] [address]-
1045  02 Oct, 2013   

 

 [facility]  [id number] [address]36-
4625  27 Aug, 2013   

 

 [facility]  [address], [place] [address]-
8368  22 Aug, 2013   







IMMUNIZATIONS

No Known Immunizations



SOCIAL HISTORY

Never Assessed



REASON FOR VISIT

Cough since Oct- was treated by ER for bronchitis ILDAtraBrady



PLAN OF CARE







 Activity  Details









  









 Follow Up  prn Reason:







VITAL SIGNS







 Height  71 in  2017

 

 Weight  255.8 lbs  2017

 

 Temperature  98.4 degrees Fahrenheit  2017

 

 Heart Rate  80 bpm  2017

 

 Respiratory Rate  22   2017

 

 BMI  35.67 kg/m2  2017

 

 Blood pressure systolic  128 mmHg  2017

 

 Blood pressure diastolic  90 mmHg  2017







MEDICATIONS







 Medication  Instructions  Dosage  Frequency  Start Date  End Date  Duration  
Status

 

 PredniSONE 20 MG  Orally Once a day  2 tablet  24h  28 Nov, 2017  3 Dec, 2017  
5 days  Active

 

 Prenatal                    Not-Taking

 

 Flonase 50 MCG/ACT  Nasally Once a day  1 spray in each nostril  24h       30 day(s)  Active

 

 Prozac 20 mg  Orally Once a day  1 capsule in the morning  24h    
      Not-Taking

 

 Zyrtec Allergy 10 MG  Orally Once a day  1 tablet  24h  28 Nov, 2017  28 Dec, 
2017  30 day(s)  Active

 

 NIFEdipine ER 60 mg  Orally Once a day  1 tablet on an empty stomach  24h          Not-Taking

 

 Robitussin DM                    Not-Taking

 

 Tylenol Cold                    Active

 

 Depo-Provera 150 MG/ML  Intramuscular every 12 weeks  1 ml        
     Not-Taking







RESULTS

No Results



PROCEDURES

No Known procedures



INSTRUCTIONS





MEDICATIONS ADMINISTERED

No Known Medications

## 2018-11-07 NOTE — XMS REPORT
[facility]

 Created on: 2017



Jr Rico

External Reference #: [id number]

: 1990

Sex: Female



Demographics







 Address  [address] [place]  [address]

 

 Preferred Language  Unknown

 

 Marital Status  Unknown

 

 Mandaen Affiliation  Unknown

 

 Race  Unknown

 

 Ethnic Group  Unknown





Author







 Author  CLAUDY MCCOLLUM

 

 Organization  [facility]

 

 Address  [address] [place]  [address]



 

 Phone  [phone]







Care Team Providers







 Care Team Member Name  Role  Phone

 

 CLAUDY MCCOLLUM  Unavailable  [phone]







PROBLEMS







 Type  Condition  ICD9-CM Code  YJI92-RO Code  Onset Dates  Condition Status  
SNOMED Code

 

 Problem  Generalized anxiety disorder     F41.1     Active  61075419

 

 Problem  Anxiety, generalized     F41.1     Active  41398611

 

 Problem  Postpartum depression     F53     Active  [id number]

 

 Problem  Patient is a currently breast-feeding mother     Z39.1     Active  
[id number]

 

 Problem  History of gestational hypertension     Z87.59     Active  [id number]







ALLERGIES

No Information



SOCIAL HISTORY

Never Assessed



PLAN OF CARE







 Activity  Details









  









 Follow Up  3 Months Reason:







VITAL SIGNS







 Height  71 in  2017-05-10

 

 Blood pressure systolic  126 mmHg  2017-05-10

 

 Blood pressure diastolic  82 mmHg  2017-05-10







MEDICATIONS

Unknown Medications



RESULTS







 Name  Result  Date  Reference Range

 

 PREGNANCY TEST, URINE (IN HOUSE)     2017-05-10   

 

 RESULTS  Negative      

 

 Lot #  [id number]      

 

 Control  +      

 

 Exp date  2018      







PROCEDURES







 Procedure  Date Ordered  Result  Body Site

 

 URINE PREGNANCY TEST  May 10, 2017      

 

 INJ MDRXYPRGESTRON CNTRACPT 150 MG  May 10, 2017      

 

 THER/PROPH/DIAG INJ, SC/IM  May 10, 2017      







IMMUNIZATIONS







 Vaccine  Route  Administration Date  Status

 

 MEDRXYPROGESTERONE ACETATE  IM Intramuscular  May 10, 2017  Administered

## 2018-11-07 NOTE — XMS REPORT
Continuity of Care Document

 Created on: 2018



DAMIAN KEN

External Reference #: [id number]

: 1990

Sex: Female



Demographics







 Address  [address]

[place]  [address]

 

 Home Phone  [phone] x

 

 Preferred Language  Unknown

 

 Marital Status  Unknown

 

 Worship Affiliation  Unknown

 

 Race  Unknown

 

 Ethnic Group  Unknown





Author







 Author  [facility]

 

 Address  Unknown

 

 Phone  Unavailable



              



Allergies

      





 Active            Description            Code            Type            
Severity            Reaction            Onset            Reported/Identified   
         Relationship to Patient            Clinical Status        

 

 Yes            No Known Drug Allergies            [id number]            Drug 
Allergy            Unknown            N/A                         2012   
                               



                  



Medications

      



There is no data.                  



Problems

      





 Date Dx Coded            Attending            Type            Code            
Diagnosis            Diagnosed By        

 

 2011                                      626.4            IRREGULAR 
MENSTRUAL CYCLE                     

 

 2011                                      V65.45            STD 
COUNSELING                     

 

 2011                                      V72.31            GYN EXAM, 
ROUTINE                     

 

 2011                                      V74.5            STD SCREEN   
                  

 

 2011            BUCKY VALENCIA                         626.4      
      IRREGULAR MENSTRUAL CYCLE                     

 

 2011            BUCKY VALENCIA A                         V65.45     
       STD COUNSELING                     

 

 2011            BUCKY VALENCIA A                         V72.31     
       GYN EXAM, ROUTINE                     

 

 2011            KASSANDRA VALENCIAIDI A                         V74.5      
      STD SCREEN                     

 

 2011            BUCKY VALENCIA A                         626.4      
      IRREGULAR MENSTRUAL CYCLE                     

 

 2011            KASSANDRA VALENCIAIDI A                         V65.45     
       STD COUNSELING                     

 

 2011            BUCKY VALENCIA A                         V72.31     
       GYN EXAM, ROUTINE                     

 

 2011            BUCKY VALENCIA A                         V74.5      
      STD SCREEN                     

 

 2012                         Ot            305.20            CANNABIS 
ABUSE-UNSPEC                     

 

 2012                         Ot            780.2            SYNCOPE AND 
COLLAPSE                     

 

 2012                         Ot            923.11            CONTUSION 
OF ELBOW                     

 

 2012                         Ot            E000.0            CIVILIAN 
ACTIVITY DONE FOR INCOME OR PAY                     

 

 2012                         Ot            E849.6            ACCIDENT IN 
PUBLIC BLDG                     

 

 2012                         Ot            E888.9            FALL NOS   
                  

 

 2013                                      564.00            CONSTIPATION
                     

 

 2013                                      789.04            ABDOMINAL 
PAIN LEFT LOWER QUADRANT                     

 

 2013            BUCKY VALENCIA                         564.00     
       CONSTIPATION                     

 

 2013            ROCHELLE APRN, BUCKY A                         789.04     
       ABDOMINAL PAIN LEFT LOWER QUADRANT                     

 

 2013            ROCHELLE VENTURA, BUCKY A                         564.00     
       CONSTIPATION                     

 

 2013            ROCHELLE VENTURA, BUCKY A                         789.04     
       ABDOMINAL PAIN LEFT LOWER QUADRANT                     

 

 10/02/2013            ROCHELLE CMN, BUCKY A                         131.01     
       TRICHOMONAL VULVOVAGINITIS                     

 

 10/02/2013            ROCHELLE VENTURA, BUCKY A                         V76.10     
       BREAST CANCER SCREENING                     

 

 10/02/2013            ROCHELLE CMN, BUCKY A                         V76.2      
      CERVICAL CANCER SCREENING (PAP SMEAR)                     

 

 10/02/2013            ROCHELLE CMN, BUCKY A                         131.01     
       TRICHOMONAL VULVOVAGINITIS                     

 

 10/02/2013            ROCHELLE VENTURA, BUCKY A                         V76.10     
       BREAST CANCER SCREENING                     

 

 10/02/2013            ROCHELLE VENTURA, BUCKY A                         V76.2      
      CERVICAL CANCER SCREENING (PAP SMEAR)                     

 

 2015            ROCHELLE VENTURA, BUCKY A                         611.71     
       MASTODYNIA                     

 

 2015            ROCHELLE VENTURA, BUCKY A                         787.01     
       NAUSEA WITH VOMITING                     

 

 2015            ROCHELLE VENTURA, BUCKY A                         787.91     
       DIARRHEA                     

 

 2015            ROCHELLE VENTURA, BUCKY A                         V73.81     
       HPV SCREENING                     

 

 2015            RANJIT LOPEZ, [place]            Ot            F17.210     
       NICOTINE DEPENDENCE, CIGARETTES, UNCOMPL                     

 

 2015            RANJIT LOPEZ, [place]            Ot            R10.11      
      RIGHT UPPER QUADRANT PAIN                     

 

 2016            STEFANO ALLEN            Ot            J40      
      BRONCHITIS, NOT SPECIFIED AS ACUTE OR CH                     

 

 2016            STEFANO ALLEN            Ot            R10.31   
         RIGHT LOWER QUADRANT PAIN                     

 

 2016            CLAUDY MCCOLLUM MD            Ot            Z33.1      
      PREGNANT STATE, INCIDENTAL                     

 

 2016            CLAUDY MCCOLLUM MD            Ot            Z3A.08     
       8 WEEKS GESTATION OF PREGNANCY                     

 

 2016            CLAUDY MCCOLLUM MD            Ot            Z33.1      
      PREG[place]NT STATE, INCIDENTAL                     

 

 2016            CLAUDY MCCOLLUM MD            Ot            Z3A.08     
       8 WEEKS GESTATION OF PREGNANCY                     

 

 2016            CLAUDY MCCOLLUM MD            Ot            Z33.1      
      [place]REG[place], INCIDENTAL                     

 

 2016            CLAUDY MCCOLLUM MD            Ot            Z3A.08     
       8 WEEKS GESTATION OF PREGNANCY                     

 

 2016            CLAUDY MCCOLLUM MD, Ot            Z34.00     
       ENCNTR FOR SUPRVSN OF NORMAL FIRST PREGN                     

 

 2016            CLAUDY MCCOLLUM MD            Ot            Z34.82     
       ENCOUNTER FOR SUPRVSN OF NORMAL PREGNANC                     

 

 2016            CLAUDY MCCOLLUM MD            Ot            Z36        
    ENCOUNTER FOR  SCREENING OF MOT                     

 

 2016            CLAUDY MCCOLLUM MD            Ot            Z3A.22     
       22 WEEKS GESTATION OF PREGNANCY                     

 

 2016            CLAUDY MCCOLLUM MD            Ot            Z34.82     
       ENCOUNTER FOR SUPRVSN OF NORMAL PREGNANC                     

 

 2016            CLAUDY MCCOLLUM MD            Ot            Z36        
    ENCOUNTER FOR  SCREENING OF MOT                     

 

 2016            CLAUDY MCCOLLUM MD, Ot            Z3A.22     
       22 WEEKS GESTATION OF PREGNANCY                     

 

 10/11/2016            CLAUDY MCCOLLUM MD, Ot            Z33.1      
      [place]REG[place], INCIDENTAL                     

 

 10/11/2016            CLAUDY MCCOLLUM MD, Ot            Z3A.08     
       8 WEEKS GESTATION OF PREGNANCY                     

 

 10/11/2016            CLAUDY MCCOLLUM MD, Ot            Z34.00     
       ENCNTR FOR SUPRVSN OF NORMAL FIRST PREGN                     

 

 10/11/2016            CLAUDY MCCOLLUM MD            Ot            Z34.82     
       ENCOUNTER FOR SUPRVSN OF NORMAL PREGNANC                     

 

 10/11/2016            CLAUDY MCCOLLUM MD            Ot            Z36        
    ENCOUNTER FOR  SCREENING OF MOT                     

 

 10/11/2016            CLAUDY MCCOLLUM MD            Ot            Z3A.22     
       22 WEEKS GESTATION OF PREGNANCY                     

 

 10/11/2016            CLAUDY MCCOLLUM MD, Ot            O9A.213    
        INJ/POISN/OTH CONSEQ OF EXTERNAL CAUSES                      

 

 10/11/2016            CLAUDY MCCOLLUM MD            Ot            R55        
    SYNCOPE AND COLLAPSE                     

 

 10/11/2016            CLAUDY MCCOLLUM MD, Ot            W19.XXXA   
         UNSPECIFIED FALL, INITIAL ENCOUNTER                     

 

 10/11/2016            CLAUDY MCCOLLUM MD            Ot            Y99.8      
      OTHER EXTERNAL CAUSE STATUS                     

 

 10/11/2016            CLAUDY MCCOLLUM MD            Ot            Z3A.28     
       28 WEEKS GESTATION OF PREGNANCY                     

 

 10/17/2016            JJ OVIEDO DO            Ot            O26.853       
     SPOTTING COMPLICATING PREGNANCY, THIRD T                     

 

 10/17/2016            JJ OVIEDO DO            Ot            Z3A.28        
    28 WEEKS GESTATION OF PREGNANCY                     

 

 10/20/2016            CHUN MD, CLAUDY N            Ot            Z33.1      
      [place]REG[place], INCIDENTAL                     

 

 10/20/2016            CLAUDY MCCOLLUM MD            Ot            Z3A.08     
       8 WEEKS GESTATION OF PREGNANCY                     

 

 10/20/2016            CLAUDY MCCOLLUM MD            Ot            Z34.00     
       ENCNTR FOR SUPRVSN OF NORMAL FIRST PREGN                     

 

 10/20/2016            CLAUDY MCCOLLUM MD            Ot            Z34.82     
       ENCOUNTER FOR SUPRVSN OF NORMAL PREGNANC                     

 

 10/20/2016            CLAUDY MCCOLLUM MD            Ot            Z36        
    ENCOUNTER FOR  SCREENING OF MOT                     

 

 10/20/2016            CLAUDY MCCOLLUM MD            Ot            Z3A.22     
       22 WEEKS GESTATION OF PREGNANCY                     

 

 10/24/2016            CLAUDY MCCOLLUM MD            Ot            O46.93     
       ANTEPARTUM HEMORRHAGE, UNSPECIFIED, THIR                     

 

 10/24/2016            CLAUDY MCCOLLUM MD            Ot            Z3A.00     
       WEEKS OF GESTATION OF PREGNANCY NOT SPEC                     

 

 10/26/2016            CLAUDY MCCOLLUM MD            Ot            O46.93     
       ANTEPARTUM HEMORRHAGE, UNSPECIFIED, THIR                     

 

 10/26/2016            CLAUDY MCCOLLUM MD            Ot            Z3A.00     
       WEEKS OF GESTATION OF PREGNANCY NOT SPEC                     

 

 2016            PREET BREWSTER JJ MACO            Ot            O26.853       
     SPOTTING COMPLICATING PREGNANCY, THIRD T                     

 

 2016            PREET BREWSTER JJ K            Ot            Z3A.28        
    28 WEEKS GESTATION OF PREGNANCY                     

 

 2016            CLAUDY MCCOLLUM MD            Ot            Z33.1      
      PREG[place]NT [place], INCIDENTAL                     

 

 2016            CLAUDY MCCLOLUM MD            Ot            Z3A.08     
       8 WEEKS GESTATION OF PREGNANCY                     

 

 2016            CLAUDY MCCOLLUM MD            Ot            Z34.00     
       ENCNTR FOR SUPRVSN OF NORMAL FIRST PREGN                     

 

 2016            CLAUDY MCCOLLUM MD            Ot            Z34.82     
       ENCOUNTER FOR SUPRVSN OF NORMAL PREGNANC                     

 

 2016            CLAUDY MCCOLLUM MD            Ot            Z36        
    ENCOUNTER FOR  SCREENING OF MOT                     

 

 2016            CLAUDY MCCOLLUM MD            Ot            Z3A.22     
       22 WEEKS GESTATION OF PREGNANCY                     

 

 2016            CLAUDY MCCOLLUM MD            Ot            O46.93     
       ANTEPARTUM HEMORRHAGE, UNSPECIFIED, THIR                     

 

 2016            CLAUDY MCCOLLUM MD            Ot            Z3A.00     
       WEEKS OF GESTATION OF PREGNANCY NOT SPEC                     

 

 2016            LISBETH KEARNEY APRN            Ot            F17.210    
        NICOTINE DEPENDENCE, CIGARETTES, UNCOMPL                     

 

 2016            LISBETH KEARNEY APRN            Ot            K04.7      
      PERIAPICAL ABSCESS WITHOUT SINUS                     

 

 2016            LISBETH KEARNEY APRN            Ot            O23.43     
       UNSP INFCT OF URINARY TRACT IN PREGNANCY                     

 

 2016            LISBETH KEARNEY            Ot            O26.893    
        OTH PREGNANCY RELATED CONDITIONS, THIRD                      

 

 2016            LISBETH KEARNEY APRN            Ot            O99.333    
        SMOKING (TOBACCO) COMPLICATING PREGNANCY                     

 

 2016            LISBETH KEARNEY APRN            Ot            O99.89     
       OTH DISEASES AND CONDITIONS COMPL PREG/C                     

 

 2016            LISBETH KEARNEY APRN            Ot            R10.30     
       LOWER ABDOMINAL PAIN, UNSPECIFIED                     

 

 2016            LISBETH KEARNEY APRN            Ot            R11.2      
      NAUSEA WITH VOMITING, UNSPECIFIED                     

 

 2016            LISBETH KEARNEY            Ot            Z3A.31     
       31 WEEKS GESTATION OF PREGNANCY                     

 

 2016            LISBETH KEARNEY APRN            Ot            F17.210    
        NICOTINE DEPENDENCE, CIGARETTES, UNCOMPL                     

 

 2016            LISBETH KEARNEY            Ot            K04.7      
      PERIAPICAL ABSCESS WITHOUT SINUS                     

 

 2016            LISBETH KEARNEY APRN            Ot            O23.43     
       UNSP INFCT OF URINARY TRACT IN PREGNANCY                     

 

 2016            LISBETH KEARNEY            Ot            O26.893    
        OT PREGNANCY RELATED CONDITIONS, THIRD                      

 

 2016            LISBETH KEARNEY            Ot            O99.333    
        SMOKING (TOBACCO) COMPLICATING PREGNANCY                     

 

 2016            LISBETH KEARNEY APRN            Ot            O99.89     
       OTH DISEASES AND CONDITIONS COMPL PREG/C                     

 

 2016            LISBETH KEARNEY APRN            Ot            R10.30     
       LOWER ABDOMINAL PAIN, UNSPECIFIED                     

 

 2016            LISBETH KEARNEY APRKATTY            Ot            R11.2      
      NAUSEA WITH VOMITING, UNSPECIFIED                     

 

 2016            LISBETH KEARNEY            Ot            Z3A.31     
       31 WEEKS GESTATION OF PREGNANCY                     

 

 2016            CLAUDY MCCOLLUM MD            Ot            O46.93     
       ANTEPARTUM HEMORRHAGE, UNSPECIFIED, THIR                     

 

 2016            CLAUDY MCCOLLUM MD            Ot            Z3A.00     
       WEEKS OF GESTATION OF PREGNANCY NOT SPEC                     

 

 2016            CLAUDY MCCOLLUM MD            Ot            Z34.82     
       ENCOUNTER FOR SUPRVSN OF NORMAL PREGNANC                     

 

 2016            CLAUDY MCCOLLUM MD            Ot            Z36        
    ENCOUNTER FOR  SCREENING OF MOT                     

 

 2016            CLAUDY MCCOLLUM MD            Ot            Z3A.22     
       22 WEEKS GESTATION OF PREGNANCY                     

 

 2016            CLAUDY MCCOLLUM MD, Ot            Z34.82     
       ENCOUNTER FOR SUPRVSN OF NORMAL PREGNANC                     

 

 2016            CLAUDY MCCOLLUM MD, Ot            Z36        
    ENCOUNTER FOR  SCREENING OF MOT                     

 

 2016            CLAUDY MCCOLLUM MD, Ot            Z3A.22     
       22 WEEKS GESTATION OF PREGNANCY                     

 

 2016            CLAUDY MCCOLLUM MD            Ot            O28.0      
      ABNORMAL HEMATOLOG FINDING ON                       

 

 2016            OVIEDO DO, JJ K            Ot            O47.03        
    FALSE LABOR BEFORE 37 COMPLETED WEEKS OF                     

 

 2016            OVIEDO DO, JJ K            Ot            Z3A.35        
    35 WEEKS GESTATION OF PREGNANCY                     

 

 2016            CLAUDY MCCOLLUM MD            Ot            O28.0      
      ABNORMAL HEMATOLOG FINDING ON                       

 

 2016            CLAUDY MCCOLLUM MD            Ot            Z3A.34     
       34 WEEKS GESTATION OF PREGNANCY                     

 

 2016            BRANDEN FELIZ MD            Ot            J02.9   
         ACUTE PHARYNGITIS, UNSPECIFIED                     

 

 2016            BRANDEN FELIZ MD            Ot            J06.9   
         ACUTE UPPER RESPIRATORY INFECTION, UNSPE                     

 

 2016            BRANDEN FELIZ MD            Ot            O14.93  
          UNSPECIFIED PRE-ECLAMPSIA, THIRD TRIMEST                     

 

 2016            BRANDEN FELIZ MD            Ot            O99.333 
           SMOKING (TOBACCO) COMPLICATING PREGNANCY                     

 

 2016            BRANDEN FELIZ MD            Ot            O99.513 
           DISEASES OF THE RESP SYS COMP PREGNANCY,                     

 

 2016            BRANDEN FELIZ MD            Ot            J02.9   
         ACUTE PHARYNGITIS, UNSPECIFIED                     

 

 2016            BRANDEN FELIZ MD, Ot            J06.9   
         ACUTE UPPER RESPIRATORY INFECTION, UNSPE                     

 

 2016            BRANDEN FELIZ MD            Ot            O14.93  
          UNSPECIFIED PRE-ECLAMPSIA, THIRD TRIMEST                     

 

 2016            BRANDEN FELIZ MD            Ot            O99.333 
           SMOKING (TOBACCO) COMPLICATING PREGNANCY                     

 

 2016            BRANDEN FELIZ MD            Ot            O99.513 
           DISEASES OF THE RESP SYS COMP PREGNANCY,                     

 

 2016            CLAUDY MCCOLLUM MD            Ot            O28.0      
      ABNORMAL HEMATOLOG FINDING ON                       

 

 2016            CLAUDY MCCOLLUM MD            Ot            Z3A.34     
       34 WEEKS GESTATION OF PREGNANCY                     

 

 2016            JAVIER CHOPRA MD, Ot            F17.210     
       NICOTINE DEPENDENCE, CIGARETTES, UNCOMPL                     

 

 2016            JAVIER CHOPRA MD            Ot            O14.03      
      MILD TO MODERATE PRE-ECLAMPSIA, THIRD TR                     

 

 2016            JAVIER CHOPRA MD            Ot            O99.333     
       SMOKING (TOBACCO) COMPLICATING PREGNANCY                     

 

 2016            JAVIER CHOPRA MD, Ot            Z3A.37      
      37 WEEKS GESTATION OF PREGNANCY                     

 

 2016            JAVIER CHOPRA MD, Ot            F17.210     
       NICOTINE DEPENDENCE, CIGARETTES, UNCOMPL                     

 

 2016            JAVIER CHOPRA MD, Ot            O14.03      
      MILD TO MODERATE PRE-ECLAMPSIA, THIRD TR                     

 

 2016            JAVIER CHOPRA MD, Ot            O99.333     
       SMOKING (TOBACCO) COMPLICATING PREGNANCY                     

 

 2016            JAVIER CHOPRA MD, Ot            Z3A.37      
      37 WEEKS GESTATION OF PREGNANCY                     

 

 2016            CLAUDY MCCOLLUM MD            Ot            O28.0      
      ABNORMAL HEMATOLOG FINDING ON                       

 

 2016            CLAUDY MCCOLLUM MD            Ot            Z3A.34     
       34 WEEKS GESTATION OF PREGNANCY                     

 

 2016            CLAUDY MCCOLLUM MD            Ot            O28.0      
      ABNORMAL HEMATOLOG FINDING ON                       

 

 2016            CLAUDY MCCOLLUM MD            Ot            Z3A.34     
       34 WEEKS GESTATION OF PREGNANCY                     

 

 2016            JJ OVIEDO DO            Ot            O47.03        
    FALSE LABOR BEFORE 37 COMPLETED WEEKS OF                     

 

 2016            JJ OVIEDO DO            Ot            Z3A.35        
    35 WEEKS GESTATION OF PREGNANCY                     

 

 2016            CLAUDY MCCOLLUM MD            Ot            F17.210    
        NICOTINE DEPENDENCE, CIGARETTES, UNCOMPL                     

 

 2016            CLAUDY MCCOLLUM MD            Ot            O10.013    
        PRE-EXISTING ESSENTIAL HTN COMP PREGNANC                     

 

 2016            CLAUDY MCCOLLUM MD            Ot            O41.03X0   
         OLIGOHYDRAMNIOS, THIRD TRIMESTER, NOT AP                     

 

 2016            CLAUDY MCCOLLUM MD            Ot            O70.0      
      FIRST DEGREE PERINEAL LACERATION DURING                      

 

 2016            CLAUDY MCCOLLUM MD            Ot            O72.1      
      OTHER IMMEDIATE POSTPARTUM HEMORRHAGE                     

 

 2016            CLAUDY MCCOLLUM MD, Ot            O76        
    ABNLT IN FETAL HEART RATE AND RHYTHM COM                     

 

 2016            CLAUDY MCCOLLUM MD, Ot            O99.333    
        SMOKING (TOBACCO) COMPLICATING PREGNANCY                     

 

 2016            CLAUDY MCCOLLUM MD            Ot            Z37.0      
      SINGLE LIVE BIRTH                     

 

 2016            CLAUDY MCCOLLUM MD, Ot            Z3A.37     
       37 WEEKS GESTATION OF PREGNANCY                     

 

 2017            CLAUDY MCCOLLUM MD, Ot            O13.3      
      GESTATIONAL HTN W/O SIGNIFICANT PROTEINU                     

 

 2017            CLAUDY MCCOLLUM MD            Ot            O28.0      
      ABNORMAL HEMATOLOG FINDING ON                       

 

 2017            CLAUDY MCCOLLUM MD, Ot            Z3A.34     
       34 WEEKS GESTATION OF PREGNANCY                     

 

 2017            CLAUDY MCCOLLUM MD            Ot            O28.0      
      ABNORMAL HEMATOLOG FINDING ON                       

 

 2017            CLAUDY MCCOLLUM MD, Ot            Z3A.34     
       34 WEEKS GESTATION OF PREGNANCY                     

 

 10/30/2017            CLAUDY MCCOLLUM MD, Ot            Z33.1      
      PREGNANT STATE, INCIDENTAL                     

 

 10/30/2017            CLAUDY MCCOLLUM MD            Ot            Z3A.08     
       8 WEEKS GESTATION OF PREGNANCY                     

 

 10/30/2017            CLAUDY MCCOLLUM MD            Ot            Z34.00     
       ENCNTR FOR SUPRVSN OF NORMAL FIRST PREGN                     

 

 10/30/2017            CLAUDY MCCOLLUM MD            Ot            Z34.82     
       ENCOUNTER FOR SUPRVSN OF NORMAL PREGNANC                     

 

 10/30/2017            CLAUDY MCCOLLUM MD            Ot            Z36        
    ENCOUNTER FOR  SCREENING OF MOT                     

 

 10/30/2017            CLAUDY MCCOLLUM MD, Ot            Z3A.22     
       22 WEEKS GESTATION OF PREGNANCY                     

 

 10/30/2017            CLAUDY MCCOLLUM MD            Ot            O46.93     
       ANTEPARTUM HEMORRHAGE, UNSPECIFIED, THIR                     

 

 10/30/2017            CLAUDY MCCOLLUM MD            Ot            Z3A.00     
       WEEKS OF GESTATION OF PREGNANCY NOT SPEC                     

 

 10/30/2017            CLAUDY MCCOLLUM MD            Ot            O28.0      
      ABNORMAL HEMATOLOG FINDING ON                       

 

 10/30/2017            CLAUDY MCCOLLUM MD            Ot            O28.0      
      ABNORMAL HEMATOLOG FINDING ON                       

 

 10/30/2017            CLAUDY MCCOLLUM MD            Ot            Z3A.34     
       34 WEEKS GESTATION OF PREGNANCY                     

 

 2017            LISBETH KEARNEY            Ot            F31.9      
      BIPOLAR DISORDER, UNSPECIFIED                     

 

 2017            LISBETH KEARNEY APRN            Ot            F41.9      
      ANXIETY DISORDER, UNSPECIFIED                     

 

 2017            LISBETH KEARNEY            Ot            J40        
    BRONCHITIS, NOT SPECIFIED AS ACUTE OR CH                     

 

 2017            LISBETH KEARNEY            Ot            R05        
    COUGH                     

 

 2017            LISBETH KEARNEY            Ot            Z80.0      
      FAMILY HISTORY OF MALIGNANT NEOPLASM OF                      

 

 2017            LISBETH KEARNEY            Ot            Z87.891    
        PERSONAL HISTORY OF NICOTINE DEPENDENCE                     

 

 2017            LISBETH KEARNEY            Ot            F31.9      
      BIPOLAR DISORDER, UNSPECIFIED                     

 

 2017            LISBETH KEARNEY            Ot            F41.9      
      ANXIETY DISORDER, UNSPECIFIED                     

 

 2017            LISBETH KEARNEY            Ot            J40        
    BRONCHITIS, NOT SPECIFIED AS ACUTE OR CH                     

 

 2017            LISBETH KEARNEY            Ot            R05        
    COUGH                     

 

 2017            LISBETH KEARNEY            Ot            Z80.0      
      FAMILY HISTORY OF MALIGNANT NEOPLASM OF                      

 

 2017            LISBETH KEARNEY            Ot            Z87.891    
        PERSONAL HISTORY OF NICOTINE DEPENDENCE                     



                                                                               
                                                                               
                                                                               
                                                                               
                                                        



Procedures

      





 Code            Description            Performed By            Performed On   
     

 

             60020                                  URINE PREGNANCY TEST (IN-
HOUSE)                                   2013        

 

             65721                                  UA W/ CULTURE IF INDICATED 
                                  2013        

 

             [id number]                                  HCG QUALITATIVE W/ 
REFLEX                                   2013        

 

             57218                                  CULTURE UROGENITAL         
                          10/02/2013        

 

             88152                                  GC/CHLAM PROBE (STATE)     
                              10/02/2013        

 

             [id number]                                  PAP SMEAR                  
                 10/02/2013        

 

                                               PAP SMEAR OBTAIN SMEAR     
                              10/02/2013        

 

             21948                                  TRICHOMONAS (IN-HOUSE)     
                              10/02/2013        

 

             [id number]                                  REPAIR PERINEUM SKIN, 
EXTERNAL APPROACH                                   2016        

 

             [id number]                                  DELIVERY OF PRODUCTS OF 
CONCEPTION, EXTE                                   2016        



                                    



Results

      





 Test            Result            Range        









 CBC With Differential/Platelet - 10/04/16 10:00         









 WBC            10.0 x10E3/uL            3.4-10.8        

 

 RBC            4.26 x10E6/uL            3.77-5.28        

 

 Hemoglobin            13.2 g/dL            11.1-15.9        

 

 Hematocrit            38.1 %            34.0-46.6        

 

 MCV            89 fL            79-97        

 

 MCH            31.0 pg            26.6-33.0        

 

 MCHC            34.6 g/dL            31.5-35.7        

 

 RDW            13.3 %            12.3-15.4        

 

 Platelets            175 x10E3/uL            150-379        

 

 Neutrophils            72 %                     

 

 Lymphs            18 %                     

 

 Monocytes            8 %                     

 

 Eos            2 %                     

 

 Basos            0 %                     

 

 Neutrophils (Absolute)            7.2 x10E3/uL            1.4-7.0        

 

 Lymphs (Absolute)            1.8 x10E3/uL            0.7-3.1        

 

 Monocytes(Absolute)            0.8 x10E3/uL            0.1-0.9        

 

 Eos (Absolute)            0.2 x10E3/uL            0.0-0.4        

 

 Baso (Absolute)            0.0 x10E3/uL            0.0-0.2        

 

 Immature Granulocytes            0 %                     

 

 Immature Grans (Abs)            0.0 x10E3/uL            0.0-0.1        









 Comp. Metabolic Panel (14) - 10/04/16 10:00         









 Glucose, Serum            100 mg/dL            65-99        

 

 BUN            6 mg/dL            6-20        

 

 Creatinine, Serum            0.55 mg/dL            0.57-1.00        

 

 eGFR If NonAfricn Am            131 mL/min/1.73                >59        

 

 eGFR If Africn Am            151 mL/min/1.73                >59        

 

 BUN/Creatinine Ratio            11             8-20        

 

 Sodium, Serum            140 mmol/L            134-144        

 

 Potassium, Serum            4.0 mmol/L            3.5-5.2        

 

 Chloride, Serum            105 mmol/L                    

 

 Carbon Dioxide, Total            19 mmol/L            18-29        

 

 Calcium, Serum            9.2 mg/dL            8.7-10.2        

 

 Protein, Total, Serum            6.1 g/dL            6.0-8.5        

 

 Albumin, Serum            3.6 g/dL            3.5-5.5        

 

 Globulin, Total            2.5 g/dL            1.5-4.5        

 

 A/G Ratio            1.4             1.1-2.5        

 

 Bilirubin, Total            0.2 mg/dL            0.0-1.2        

 

 Alkaline Phosphatase, S            99 IU/L                    

 

 AST (SGOT)            21 IU/L            0-40        

 

 ALT (SGPT)            28 IU/L            0-32        









 Prot+CreatU (Random) - 10/04/16 10:00         









 Creatinine, Urine            58.8 mg/dL            Not Estab.        

 

 Protein,Total,Urine            10.0 mg/dL            Not Estab.        

 

 Protein/Creat Ratio            170 mg/g creat            0-200        









 Uric Acid, Serum - 10/04/16 10:00         









 Uric Acid, Serum            5.0 mg/dL            2.5-7.1        









 LDH - 10/04/16 10:00         









 LDH            152 IU/L            119-226        









 CBC With Differential/Platelet - 10/07/16 15:38         









 WBC            9.9 x10E3/uL            3.4-10.8        

 

 RBC            4.09 x10E6/uL            3.77-5.28        

 

 Hemoglobin            12.8 g/dL            11.1-15.9        

 

 Hematocrit            37.7 %            34.0-46.6        

 

 MCV            92 fL            79-97        

 

 MCH            31.3 pg            26.6-33.0        

 

 MCHC            34.0 g/dL            31.5-35.7        

 

 RDW            13.4 %            12.3-15.4        

 

 Platelets            185 x10E3/uL            150-379        

 

 Neutrophils            71 %                     

 

 Lymphs            21 %                     

 

 Monocytes            6 %                     

 

 Eos            2 %                     

 

 Basos            0 %                     

 

 Neutrophils (Absolute)            7.0 x10E3/uL            1.4-7.0        

 

 Lymphs (Absolute)            2.1 x10E3/uL            0.7-3.1        

 

 Monocytes(Absolute)            0.6 x10E3/uL            0.1-0.9        

 

 Eos (Absolute)            0.2 x10E3/uL            0.0-0.4        

 

 Baso (Absolute)            0.0 x10E3/uL            0.0-0.2        

 

 Immature Granulocytes            0 %                     

 

 Immature Grans (Abs)            0.0 x10E3/uL            0.0-0.1        









 Gest. Diabetes 1-Hr Screen - 10/07/16 15:38         









 Gestational Diabetes Screen            119 mg/dL                    









 CBC With Differential/Platelet - 10/12/16 10:33         









 WBC            11.9 x10E3/uL            3.4-10.8        

 

 RBC            4.03 x10E6/uL            3.77-5.28        

 

 Hemoglobin            12.5 g/dL            11.1-15.9        

 

 Hematocrit            37.1 %            34.0-46.6        

 

 MCV            92 fL            79-97        

 

 MCH            31.0 pg            26.6-33.0        

 

 MCHC            33.7 g/dL            31.5-35.7        

 

 RDW            13.4 %            12.3-15.4        

 

 Platelets            177 x10E3/uL            150-379        

 

 Neutrophils            75 %                     

 

 Lymphs            17 %                     

 

 Monocytes            6 %                     

 

 Eos            2 %                     

 

 Basos            0 %                     

 

 Neutrophils (Absolute)            8.9 x10E3/uL            1.4-7.0        

 

 Lymphs (Absolute)            2.0 x10E3/uL            0.7-3.1        

 

 Monocytes(Absolute)            0.8 x10E3/uL            0.1-0.9        

 

 Eos (Absolute)            0.2 x10E3/uL            0.0-0.4        

 

 Baso (Absolute)            0.0 x10E3/uL            0.0-0.2        

 

 Immature Granulocytes            0 %                     

 

 Immature Grans (Abs)            0.0 x10E3/uL            0.0-0.1        









 Comp. Metabolic Panel (14) - 10/12/16 10:33         









 Glucose, Serum            83 mg/dL            65-99        

 

 BUN            4 mg/dL            6-20        

 

 Creatinine, Serum            0.60 mg/dL            0.57-1.00        

 

 eGFR If NonAfricn Am            126 mL/min/1.73                >59        

 

 eGFR If Africn Am            145 mL/min/1.73                >59        

 

 BUN/Creatinine Ratio            7             8-20        

 

 Sodium, Serum            142 mmol/L            134-144        

 

 Potassium, Serum            4.1 mmol/L            3.5-5.2        

 

 Chloride, Serum            105 mmol/L                    

 

 Carbon Dioxide, Total            20 mmol/L            18-29        

 

 Calcium, Serum            9.1 mg/dL            8.7-10.2        

 

 Protein, Total, Serum            5.7 g/dL            6.0-8.5        

 

 Albumin, Serum            3.4 g/dL            3.5-5.5        

 

 Globulin, Total            2.3 g/dL            1.5-4.5        

 

 A/G Ratio            1.5             1.1-2.5        

 

 Bilirubin, Total            0.2 mg/dL            0.0-1.2        

 

 Alkaline Phosphatase, S            101 IU/L                    

 

 AST (SGOT)            14 IU/L            0-40        

 

 ALT (SGPT)            22 IU/L            0-32        









 Prot+CreatU (Random) - 10/12/16 10:33         









 Creatinine, Urine            114.6 mg/dL            Not Estab.        

 

 Protein,Total,Urine            23.6 mg/dL            Not Estab.        

 

 Protein/Creat Ratio            206 mg/g creat            0-200        









 Uric Acid, Serum - 10/12/16 10:33         









 Uric Acid, Serum            5.2 mg/dL            2.5-7.1        









 LDH - 10/12/16 10:33         









 LDH            155 IU/L            119-226        









 Urine Culture, Routine - 10/12/16 10:33         









 Urine Culture, Routine            Note                      









 Urine Culture, Routine - 10/18/16 15:19         









 Urine Culture, Routine            Note                      









 Genital Culture, Routine - 10/18/16 15:19         









 Genital Culture, Routine            Note                      









 Complete blood count (CBC) with automated white blood cell (WBC) differential 
- 16 11:14         









 Blood leukocytes automated count (number/volume)            14.3 10*3/uL      
      4.3-11.0        

 

 Blood erythrocytes automated count (number/volume)            4.22 10*6/uL    
        4.35-5.85        

 

 Venous blood hemoglobin measurement (mass/volume)            13.4 g/dL        
    11.5-16.0        

 

 Blood hematocrit (volume fraction)            38 %            35-52        

 

 Automated erythrocyte mean corpuscular volume            90 [foz_us]          
  80-99        

 

 Automated erythrocyte mean corpuscular hemoglobin (mass per erythrocyte)      
      32 pg            25-34        

 

 Automated erythrocyte mean corpuscular hemoglobin concentration measurement (
mass/volume)            35 g/dL            32-36        

 

 Automated erythrocyte distribution width ratio            12.9 %            
10.0-14.5        

 

 Automated blood platelet count (count/volume)            188 10*3/uL          
  130-400        

 

 Automated blood platelet mean volume measurement            10.4 [foz_us]     
       7.4-10.4        

 

 Automated blood neutrophils/100 leukocytes            72 %            42-75   
     

 

 Automated blood lymphocytes/100 leukocytes            20 %            12-44   
     

 

 Blood monocytes/100 leukocytes            7 %            0-12        

 

 Automated blood eosinophils/100 leukocytes            1 %            0-10     
   

 

 Automated blood basophils/100 leukocytes            0 %            0-10        

 

 Blood neutrophils automated count (number/volume)            10.3 10*3        
    1.8-7.8        

 

 Blood lymphocytes automated count (number/volume)            2.8 10*3         
   1.0-4.0        

 

 Blood monocytes automated count (number/volume)            1.0 10*3            
0.0-1.0        

 

 Automated eosinophil count            0.2 10*3/uL            0.0-0.3        

 

 Automated blood basophil count (count/volume)            0.0 10*3/uL          
  0.0-0.1        









 Blood manual differential performed detection - 16 11:14         









 Blood monocytes/100 leukocytes            3 %            NRG        

 

 Manual blood segmented neutrophils/100 leukocytes            71 %            
NRG        

 

 Manual blood lymphocytes/100 leukocytes            23 %            NRG        

 

 Manual eosinophils/100 leukocytes in nose            2 %            NRG        

 

 Manual blood basophils/100 leukocytes            1 %            NRG        

 

 Blood erythrocyte morphology finding identification            NORMAL         
    NRG        









 Serum or plasma choriogonadotropin measurement (units/volume) - 16 11:14
         









 Serum or plasma choriogonadotropin measurement (units/volume)            48789 
m[iU]/mL            <5        









 Complete urinalysis with reflex to culture - 16 12:13         









 Urine color determination            LYUBOV             NRG        

 

 Urine clarity determination            CLEAR             NRG        

 

 Urine pH measurement by test strip            7             5-9        

 

 Specific gravity of urine by test strip            1.015             1.016-
1.022        

 

 Urine protein assay by test strip, semi-quantitative            1+             
NEGATIVE        

 

 Urine glucose detection by automated test strip            NEGATIVE           
  NEGATIVE        

 

 Erythrocytes detection in urine sediment by light microscopy            
NEGATIVE             NEGATIVE        

 

 Urine ketones detection by automated test strip            2+             
NEGATIVE        

 

 Urine nitrite detection by test strip            NEGATIVE             NEGATIVE
        

 

 Urine total bilirubin detection by test strip            NEGATIVE             
NEGATIVE        

 

 Urine urobilinogen measurement by automated test strip (mass/volume)          
  1 mg/dL            NORMAL        

 

 Urine leukocyte esterase detection by dipstick            3+             
NEGATIVE        

 

 Automated urine sediment erythrocyte count by microscopy (number/high power 
field)            NONE             NRG        

 

 Automated urine sediment leukocyte count by microscopy (number/high power field
)             [HPF]            NRG        

 

 Bacteria detection in urine sediment by light microscopy            MODERATE  
           NRG        

 

 Squamous epithelial cells detection in urine sediment by light microscopy     
       >50             NRG        

 

 Crystals detection in urine sediment by light microscopy            NONE      
       NRG        

 

 Casts detection in urine sediment by light microscopy            NONE         
    NRG        

 

 Mucus detection in urine sediment by light microscopy            NEGATIVE     
        NRG        

 

 Complete urinalysis with reflex to culture            YES             NRG     
   









 Bacterial urine culture - 16 12:13         









 URINE CULTURE RESULTS            <10,000/ML             NRG        









 CBC With Differential/Platelet - 16 13:59         









 WBC            11.3 x10E3/uL            3.4-10.8        

 

 RBC            4.24 x10E6/uL            3.77-5.28        

 

 Hemoglobin            13.2 g/dL            11.1-15.9        

 

 Hematocrit            39.5 %            34.0-46.6        

 

 MCV            93 fL            79-97        

 

 MCH            31.1 pg            26.6-33.0        

 

 MCHC            33.4 g/dL            31.5-35.7        

 

 RDW            13.1 %            12.3-15.4        

 

 Platelets            215 x10E3/uL            150-379        

 

 Neutrophils            68 %                     

 

 Lymphs            23 %                     

 

 Monocytes            7 %                     

 

 Eos            2 %                     

 

 Basos            0 %                     

 

 Neutrophils (Absolute)            7.6 x10E3/uL            1.4-7.0        

 

 Lymphs (Absolute)            2.6 x10E3/uL            0.7-3.1        

 

 Monocytes(Absolute)            0.8 x10E3/uL            0.1-0.9        

 

 Eos (Absolute)            0.3 x10E3/uL            0.0-0.4        

 

 Baso (Absolute)            0.0 x10E3/uL            0.0-0.2        

 

 Immature Granulocytes            0 %                     

 

 Immature Grans (Abs)            0.0 x10E3/uL            0.0-0.1        









 Comp. Metabolic Panel (14) - 16 13:59         









 Glucose, Serum            85 mg/dL            65-99        

 

 BUN            7 mg/dL            6-20        

 

 Creatinine, Serum            0.58 mg/dL            0.57-1.00        

 

 eGFR If NonAfricn Am            128 mL/min/1.73                >59        

 

 eGFR If Africn Am            147 mL/min/1.73                >59        

 

 BUN/Creatinine Ratio            12             8-20        

 

 Sodium, Serum            140 mmol/L            136-144        

 

 Potassium, Serum            4.3 mmol/L            3.5-5.2        

 

 Chloride, Serum            103 mmol/L                    

 

 Carbon Dioxide, Total            22 mmol/L            18-29        

 

 Calcium, Serum            9.6 mg/dL            8.7-10.2        

 

 Protein, Total, Serum            6.0 g/dL            6.0-8.5        

 

 Albumin, Serum            2.9 g/dL            3.5-5.5        

 

 Globulin, Total            3.1 g/dL            1.5-4.5        

 

 A/G Ratio            0.9             1.1-2.5        

 

 Bilirubin, Total            0.2 mg/dL            0.0-1.2        

 

 Alkaline Phosphatase, S            140 IU/L                    

 

 AST (SGOT)            15 IU/L            0-40        

 

 ALT (SGPT)            16 IU/L            0-32        









 Prot+CreatU (Random) - 16 13:59         









 Creatinine, Urine            170.5 mg/dL            Not Estab.        

 

 Protein,Total,Urine            33.8 mg/dL            Not Estab.        

 

 Protein/Creat Ratio            198 mg/g creat            0-200        









 Uric Acid, Serum - 16 13:59         









 Uric Acid, Serum            5.0 mg/dL            2.5-7.1        









 LDH - 16 13:59         









 LDH            160 IU/L            119-226        









 Prot+CreatU (Random) - 16 16:15         









 Creatinine, Urine            183.4 mg/dL            Not Estab.        

 

 Protein,Total,Urine            32.9 mg/dL            Not Estab.        

 

 Protein/Creat Ratio            179 mg/g creat            0-200        









 CBC With Differential/Platelet - 16 16:15         









 WBC            12.3 x10E3/uL            3.4-10.8        

 

 RBC            4.16 x10E6/uL            3.77-5.28        

 

 Hemoglobin            13.0 g/dL            11.1-15.9        

 

 Hematocrit            39.0 %            34.0-46.6        

 

 MCV            94 fL            79-97        

 

 MCH            31.3 pg            26.6-33.0        

 

 MCHC            33.3 g/dL            31.5-35.7        

 

 RDW            13.3 %            12.3-15.4        

 

 Platelets            199 x10E3/uL            150-379        

 

 Neutrophils            66 %                     

 

 Lymphs            24 %                     

 

 Monocytes            8 %                     

 

 Eos            2 %                     

 

 Basos            0 %                     

 

 Neutrophils (Absolute)            8.1 x10E3/uL            1.4-7.0        

 

 Lymphs (Absolute)            2.9 x10E3/uL            0.7-3.1        

 

 Monocytes(Absolute)            1.0 x10E3/uL            0.1-0.9        

 

 Eos (Absolute)            0.2 x10E3/uL            0.0-0.4        

 

 Baso (Absolute)            0.0 x10E3/uL            0.0-0.2        

 

 Immature Granulocytes            0 %                     

 

 Immature Grans (Abs)            0.0 x10E3/uL            0.0-0.1        









 Comp. Metabolic Panel (14) - 16 16:15         









 Glucose, Serum            97 mg/dL            65-99        

 

 BUN            8 mg/dL            6-20        

 

 Creatinine, Serum            0.58 mg/dL            0.57-1.00        

 

 eGFR If NonAfricn Am            128 mL/min/1.73                >59        

 

 eGFR If Africn Am            147 mL/min/1.73                >59        

 

 BUN/Creatinine Ratio            14             8-20        

 

 Sodium, Serum            141 mmol/L            136-144        

 

 Potassium, Serum            4.6 mmol/L            3.5-5.2        

 

 Chloride, Serum            104 mmol/L                    

 

 Carbon Dioxide, Total            20 mmol/L            18-29        

 

 Calcium, Serum            9.3 mg/dL            8.7-10.2        

 

 Protein, Total, Serum            5.9 g/dL            6.0-8.5        

 

 Albumin, Serum            3.3 g/dL            3.5-5.5        

 

 Globulin, Total            2.6 g/dL            1.5-4.5        

 

 A/G Ratio            1.3             1.1-2.5        

 

 Bilirubin, Total            0.2 mg/dL            0.0-1.2        

 

 Alkaline Phosphatase, S            167 IU/L                    

 

 AST (SGOT)            18 IU/L            0-40        

 

 ALT (SGPT)            16 IU/L            0-32        









 Uric Acid, Serum - 16 16:15         









 Uric Acid, Serum            5.3 mg/dL            2.5-7.1        









 LDH - 16 16:15         









 LDH            159 IU/L            119-226        









 CBC With Differential/Platelet - 16 15:27         









 WBC            12.8 x10E3/uL            3.4-10.8        

 

 RBC            4.57 x10E6/uL            3.77-5.28        

 

 Hemoglobin            14.2 g/dL            11.1-15.9        

 

 Hematocrit            42.5 %            34.0-46.6        

 

 MCV            93 fL            79-97        

 

 MCH            31.1 pg            26.6-33.0        

 

 MCHC            33.4 g/dL            31.5-35.7        

 

 RDW            12.9 %            12.3-15.4        

 

 Platelets            215 x10E3/uL            150-379        

 

 Neutrophils            69 %                     

 

 Lymphs            22 %                     

 

 Monocytes            7 %                     

 

 Eos            2 %                     

 

 Basos            0 %                     

 

 Neutrophils (Absolute)            8.8 x10E3/uL            1.4-7.0        

 

 Lymphs (Absolute)            2.9 x10E3/uL            0.7-3.1        

 

 Monocytes(Absolute)            0.9 x10E3/uL            0.1-0.9        

 

 Eos (Absolute)            0.2 x10E3/uL            0.0-0.4        

 

 Baso (Absolute)            0.0 x10E3/uL            0.0-0.2        

 

 Immature Granulocytes            0 %                     

 

 Immature Grans (Abs)            0.0 x10E3/uL            0.0-0.1        









 Comp. Metabolic Panel (14) - 16 15:27         









 Glucose, Serum            117 mg/dL            65-99        

 

 BUN            8 mg/dL            6-20        

 

 Creatinine, Serum            0.55 mg/dL            0.57-1.00        

 

 eGFR If NonAfricn Am            130 mL/min/1.73                >59        

 

 eGFR If Africn Am            150 mL/min/1.73                >59        

 

 BUN/Creatinine Ratio            15             8-20        

 

 Sodium, Serum            139 mmol/L            136-144        

 

 Potassium, Serum            4.2 mmol/L            3.5-5.2        

 

 Chloride, Serum            100 mmol/L                    

 

 Carbon Dioxide, Total            23 mmol/L            18-29        

 

 Calcium, Serum            9.5 mg/dL            8.7-10.2        

 

 Protein, Total, Serum            6.5 g/dL            6.0-8.5        

 

 Albumin, Serum            3.8 g/dL            3.5-5.5        

 

 Globulin, Total            2.7 g/dL            1.5-4.5        

 

 A/G Ratio            1.4             1.1-2.5        

 

 Bilirubin, Total            0.3 mg/dL            0.0-1.2        

 

 Alkaline Phosphatase, S            207 IU/L                    

 

 AST (SGOT)            15 IU/L            0-40        

 

 ALT (SGPT)            15 IU/L            0-32        









 Prot+CreatU (Random) - 16 15:27         









 Creatinine, Urine            124.7 mg/dL            Not Estab.        

 

 Protein,Total,Urine            27.8 mg/dL            Not Estab.        

 

 Protein/Creat Ratio            223 mg/g creat            0-200        









 Uric Acid, Serum - 16 15:27         









 Uric Acid, Serum            5.8 mg/dL            2.5-7.1        









 LDH - 16 15:27         









 LDH            184 IU/L            119-226        









 Complete urinalysis with reflex to culture - 16 21:30         









 Urine color determination            YELLOW             NRG        

 

 Urine clarity determination            SLIGHTLY CLOUDY             NRG        

 

 Urine pH measurement by test strip            6             5-9        

 

 Specific gravity of urine by test strip            1.025             1.016-
1.022        

 

 Urine protein assay by test strip, semi-quantitative            2+             
NEGATIVE        

 

 Urine glucose detection by automated test strip            NEGATIVE           
  NEGATIVE        

 

 Erythrocytes detection in urine sediment by light microscopy            1+    
         NEGATIVE        

 

 Urine ketones detection by automated test strip            1+             
NEGATIVE        

 

 Urine nitrite detection by test strip            NEGATIVE             NEGATIVE
        

 

 Urine total bilirubin detection by test strip            NEGATIVE             
NEGATIVE        

 

 Urine urobilinogen measurement by automated test strip (mass/volume)          
  1 mg/dL            NORMAL        

 

 Urine leukocyte esterase detection by dipstick            3+             
NEGATIVE        

 

 Automated urine sediment erythrocyte count by microscopy (number/high power 
field)             [HPF]            NRG        

 

 Automated urine sediment leukocyte count by microscopy (number/high power field
)            > [HPF]            NRG        

 

 Bacteria detection in urine sediment by light microscopy            MODERATE  
           NRG        

 

 Squamous epithelial cells detection in urine sediment by light microscopy     
       5-10             NRG        

 

 Crystals detection in urine sediment by light microscopy            NONE      
       NRG        

 

 Casts detection in urine sediment by light microscopy            NONE         
    NRG        

 

 Mucus detection in urine sediment by light microscopy            NEGATIVE     
        NRG        

 

 Complete urinalysis with reflex to culture            YES             NRG     
   

 

 Urine Trichomonas species detection by light microscopy            FEW        
     NRG        









 Bacterial urine culture - 16 21:30         









 URINE CULTURE RESULTS            <10,000/ML             NRG        









 Strep Gp B Culture - 16 15:11         









 Strep Gp B Culture            Negative             Negative        









 CBC With Differential/Platelet - 16 15:11         









 WBC            13.2 x10E3/uL            3.4-10.8        

 

 RBC            4.47 x10E6/uL            3.77-5.28        

 

 Hemoglobin            13.9 g/dL            11.1-15.9        

 

 Hematocrit            41.1 %            34.0-46.6        

 

 MCV            92 fL            79-97        

 

 MCH            31.1 pg            26.6-33.0        

 

 MCHC            33.8 g/dL            31.5-35.7        

 

 RDW            12.8 %            12.3-15.4        

 

 Platelets            214 x10E3/uL            150-379        

 

 Neutrophils            67 %                     

 

 Lymphs            24 %                     

 

 Monocytes            8 %                     

 

 Eos            1 %                     

 

 Basos            0 %                     

 

 Neutrophils (Absolute)            8.7 x10E3/uL            1.4-7.0        

 

 Lymphs (Absolute)            3.1 x10E3/uL            0.7-3.1        

 

 Monocytes(Absolute)            1.1 x10E3/uL            0.1-0.9        

 

 Eos (Absolute)            0.2 x10E3/uL            0.0-0.4        

 

 Baso (Absolute)            0.0 x10E3/uL            0.0-0.2        

 

 Immature Granulocytes            0 %                     

 

 Immature Grans (Abs)            0.0 x10E3/uL            0.0-0.1        









 Comp. Metabolic Panel (14) - 16 15:11         









 Glucose, Serum            85 mg/dL            65-99        

 

 BUN            10 mg/dL            6-20        

 

 Creatinine, Serum            0.56 mg/dL            0.57-1.00        

 

 eGFR If NonAfricn Am            129 mL/min/1.73                >59        

 

 eGFR If Africn Am            149 mL/min/1.73                >59        

 

 BUN/Creatinine Ratio            18             8-20        

 

 Sodium, Serum            140 mmol/L            136-144        

 

 Potassium, Serum            4.5 mmol/L            3.5-5.2        

 

 Chloride, Serum            104 mmol/L                    

 

 Carbon Dioxide, Total            21 mmol/L            18-29        

 

 Calcium, Serum            9.3 mg/dL            8.7-10.2        

 

 Protein, Total, Serum            5.9 g/dL            6.0-8.5        

 

 Albumin, Serum            3.4 g/dL            3.5-5.5        

 

 Globulin, Total            2.5 g/dL            1.5-4.5        

 

 A/G Ratio            1.4             1.1-2.5        

 

 Bilirubin, Total            0.3 mg/dL            0.0-1.2        

 

 Alkaline Phosphatase, S            226 IU/L                    

 

 AST (SGOT)            20 IU/L            0-40        

 

 ALT (SGPT)            16 IU/L            0-32        









 Prot+CreatU (Random) - 16 15:11         









 Creatinine, Urine            222.7 mg/dL            Not Estab.        

 

 Protein,Total,Urine            32.7 mg/dL            Not Estab.        

 

 Protein/Creat Ratio            147 mg/g creat            0-200        









 Uric Acid, Serum - 16 15:11         









 Uric Acid, Serum            6.4 mg/dL            2.5-7.1        









 LDH - 16 15:11         









 LDH            184 IU/L            119-226        









 Streptococcus pyogenes antigen detection - 16 08:52         









 Streptococcus pyogenes antigen detection            NEGATIVE             
NEGATIVE        









 Bacterial throat culture - 16 08:52         









 Bacterial throat culture            NBS             NRG        









 Complete blood count (CBC) with automated white blood cell (WBC) differential 
- 16 09:00         









 Blood leukocytes automated count (number/volume)            11.3 10*3/uL      
      4.3-11.0        

 

 Blood erythrocytes automated count (number/volume)            4.21 10*6/uL    
        4.35-5.85        

 

 Venous blood hemoglobin measurement (mass/volume)            13.1 g/dL        
    11.5-16.0        

 

 Blood hematocrit (volume fraction)            38 %            35-52        

 

 Automated erythrocyte mean corpuscular volume            91 [foz_us]          
  80-99        

 

 Automated erythrocyte mean corpuscular hemoglobin (mass per erythrocyte)      
      31 pg            25-34        

 

 Automated erythrocyte mean corpuscular hemoglobin concentration measurement (
mass/volume)            34 g/dL            32-36        

 

 Automated erythrocyte distribution width ratio            12.6 %            
10.0-14.5        

 

 Automated blood platelet count (count/volume)            147 10*3/uL          
  130-400        

 

 Automated blood platelet mean volume measurement            11.2 [foz_us]     
       7.4-10.4        

 

 Automated blood neutrophils/100 leukocytes            64 %            42-75   
     

 

 Automated blood lymphocytes/100 leukocytes            25 %            12-44   
     

 

 Blood monocytes/100 leukocytes            9 %            0-12        

 

 Automated blood eosinophils/100 leukocytes            2 %            0-10     
   

 

 Automated blood basophils/100 leukocytes            0 %            0-10        

 

 Blood neutrophils automated count (number/volume)            7.3 10*3         
   1.8-7.8        

 

 Blood lymphocytes automated count (number/volume)            2.9 10*3         
   1.0-4.0        

 

 Blood monocytes automated count (number/volume)            1.0 10*3            
0.0-1.0        

 

 Automated eosinophil count            0.2 10*3/uL            0.0-0.3        

 

 Automated blood basophil count (count/volume)            0.0 10*3/uL          
  0.0-0.1        









 Serum heterophile antibody titer - 16 09:00         









 Serum heterophile antibody titer            NEGATIVE             NEGATIVE     
   









 CBC With Differential/Platelet - 16 15:53         









 WBC            15.4 x10E3/uL            3.4-10.8        

 

 RBC            4.19 x10E6/uL            3.77-5.28        

 

 Hemoglobin            12.7 g/dL            11.1-15.9        

 

 Hematocrit            38.0 %            34.0-46.6        

 

 MCV            91 fL            79-97        

 

 MCH            30.3 pg            26.6-33.0        

 

 MCHC            33.4 g/dL            31.5-35.7        

 

 RDW            12.3 %            12.3-15.4        

 

 Platelets            178 x10E3/uL            150-379        

 

 Neutrophils            61 %                     

 

 Lymphs            31 %                     

 

 Monocytes            7 %                     

 

 Eos            1 %                     

 

 Basos            0 %                     

 

 Neutrophils (Absolute)            9.3 x10E3/uL            1.4-7.0        

 

 Lymphs (Absolute)            4.7 x10E3/uL            0.7-3.1        

 

 Monocytes(Absolute)            1.1 x10E3/uL            0.1-0.9        

 

 Eos (Absolute)            0.1 x10E3/uL            0.0-0.4        

 

 Baso (Absolute)            0.0 x10E3/uL            0.0-0.2        

 

 Immature Granulocytes            0 %                     

 

 Immature Grans (Abs)            0.0 x10E3/uL            0.0-0.1        









 Comp. Metabolic Panel (14) - 16 15:53         









 Glucose, Serum            98 mg/dL            65-99        

 

 BUN            9 mg/dL            6-20        

 

 Creatinine, Serum            0.60 mg/dL            0.57-1.00        

 

 eGFR If NonAfricn Am            126 mL/min/1.73                >59        

 

 eGFR If Africn Am            145 mL/min/1.73                >59        

 

 BUN/Creatinine Ratio            15             8-20        

 

 Sodium, Serum            142 mmol/L            134-144        

 

 Potassium, Serum            3.8 mmol/L            3.5-5.2        

 

 Chloride, Serum            106 mmol/L                    

 

 Carbon Dioxide, Total            19 mmol/L            18-29        

 

 Calcium, Serum            8.9 mg/dL            8.7-10.2        

 

 Protein, Total, Serum            5.9 g/dL            6.0-8.5        

 

 Albumin, Serum            3.5 g/dL            3.5-5.5        

 

 Globulin, Total            2.4 g/dL            1.5-4.5        

 

 A/G Ratio            1.5             1.1-2.5        

 

 Bilirubin, Total            <0.2 mg/dL            0.0-1.2        

 

 Alkaline Phosphatase, S            215 IU/L                    

 

 AST (SGOT)            13 IU/L            0-40        

 

 ALT (SGPT)            11 IU/L            0-32        









 Prot+CreatU (Random) - 16 15:53         









 Creatinine, Urine            218.9 mg/dL            Not Estab.        

 

 Protein,Total,Urine            47.9 mg/dL            Not Estab.        

 

 Protein/Creat Ratio            219 mg/g creat            0-200        









 Uric Acid, Serum - 16 15:53         









 Uric Acid, Serum            5.8 mg/dL            2.5-7.1        









 LDH - 16 15:53         









 LDH            170 IU/L            119-226        









 Complete urinalysis with reflex to culture - 16 01:20         









 Urine color determination            YELLOW             NRG        

 

 Urine clarity determination            SLIGHTLY CLOUDY             NRG        

 

 Urine pH measurement by test strip            6.5             5-9        

 

 Specific gravity of urine by test strip            1.020             1.016-
1.022        

 

 Urine protein assay by test strip, semi-quantitative            2+             
NEGATIVE        

 

 Urine glucose detection by automated test strip            NEGATIVE           
  NEGATIVE        

 

 Erythrocytes detection in urine sediment by light microscopy            
NEGATIVE             NEGATIVE        

 

 Urine ketones detection by automated test strip            1+             
NEGATIVE        

 

 Urine nitrite detection by test strip            NEGATIVE             NEGATIVE
        

 

 Urine total bilirubin detection by test strip            NEGATIVE             
NEGATIVE        

 

 Urine urobilinogen measurement by automated test strip (mass/volume)          
  4 mg/dL            NORMAL        

 

 Urine leukocyte esterase detection by dipstick            2+             
NEGATIVE        

 

 Automated urine sediment erythrocyte count by microscopy (number/high power 
field)            NONE             NRG        

 

 Automated urine sediment leukocyte count by microscopy (number/high power field
)             [HPF]            NRG        

 

 Bacteria detection in urine sediment by light microscopy            FEW       
      NRG        

 

 Squamous epithelial cells detection in urine sediment by light microscopy     
       10-25             NRG        

 

 Crystals detection in urine sediment by light microscopy            NONE      
       NRG        

 

 Casts detection in urine sediment by light microscopy            NONE         
    NRG        

 

 Mucus detection in urine sediment by light microscopy            LARGE        
     NRG        

 

 Complete urinalysis with reflex to culture            NO             NRG      
  









 Urine protein/creatinine mass ratio - 16 01:20         









 Urine protein measurement (mass/volume)            66 mg/dL            6-12   
     

 

 Urine creatinine measurement (mass/volume)            328 mg/dL            30-
125        

 

 Urine protein/creatinine mass ratio            0.20             NRG        









 Complete blood count (CBC) with automated white blood cell (WBC) differential 
- 16 02:20         









 Blood leukocytes automated count (number/volume)            13.0 10*3/uL      
      4.3-11.0        

 

 Blood erythrocytes automated count (number/volume)            4.13 10*6/uL    
        4.35-5.85        

 

 Venous blood hemoglobin measurement (mass/volume)            12.8 g/dL        
    11.5-16.0        

 

 Blood hematocrit (volume fraction)            37 %            35-52        

 

 Automated erythrocyte mean corpuscular volume            90 [foz_us]          
  80-99        

 

 Automated erythrocyte mean corpuscular hemoglobin (mass per erythrocyte)      
      31 pg            25-34        

 

 Automated erythrocyte mean corpuscular hemoglobin concentration measurement (
mass/volume)            34 g/dL            32-36        

 

 Automated erythrocyte distribution width ratio            12.6 %            
10.0-14.5        

 

 Automated blood platelet count (count/volume)            164 10*3/uL          
  130-400        

 

 Automated blood platelet mean volume measurement            11.6 [foz_us]     
       7.4-10.4        

 

 Automated blood neutrophils/100 leukocytes            62 %            42-75   
     

 

 Automated blood lymphocytes/100 leukocytes            28 %            12-44   
     

 

 Blood monocytes/100 leukocytes            9 %            0-12        

 

 Automated blood eosinophils/100 leukocytes            1 %            0-10     
   

 

 Automated blood basophils/100 leukocytes            0 %            0-10        

 

 Blood neutrophils automated count (number/volume)            8.1 10*3         
   1.8-7.8        

 

 Blood lymphocytes automated count (number/volume)            3.6 10*3         
   1.0-4.0        

 

 Blood monocytes automated count (number/volume)            1.1 10*3            
0.0-1.0        

 

 Automated eosinophil count            0.2 10*3/uL            0.0-0.3        

 

 Automated blood basophil count (count/volume)            0.0 10*3/uL          
  0.0-0.1        









 Comprehensive metabolic panel - 16 02:20         









 Serum or plasma sodium measurement (moles/volume)            138 mmol/L       
     135-145        

 

 Serum or plasma potassium measurement (moles/volume)            4.0 mmol/L    
        3.6-5.0        

 

 Serum or plasma chloride measurement (moles/volume)            109 mmol/L     
               

 

 Carbon dioxide            21 mmol/L            21-32        

 

 Serum or plasma anion gap determination (moles/volume)            8 mmol/L    
        5-14        

 

 Serum or plasma urea nitrogen measurement (mass/volume)            10 mg/dL   
         7-18        

 

 Serum or plasma creatinine measurement (mass/volume)            0.62 mg/dL    
        0.60-1.30        

 

 Serum or plasma urea nitrogen/creatinine mass ratio            16             
NRG        

 

 Serum or plasma creatinine measurement with calculation of estimated 
glomerular filtration rate            >             NRG        

 

 Serum or plasma glucose measurement (mass/volume)            82 mg/dL         
           

 

 Serum or plasma calcium measurement (mass/volume)            8.9 mg/dL        
    8.5-10.1        

 

 Serum or plasma total bilirubin measurement (mass/volume)            0.3 mg/dL
            0.1-1.0        

 

 Serum or plasma alkaline phosphatase measurement (enzymatic activity/volume)  
          245 U/L                    

 

 Serum or plasma aspartate aminotransferase measurement (enzymatic activity/
volume)            17 U/L            5-34        

 

 Serum or plasma alanine aminotransferase measurement (enzymatic activity/volume
)            16 U/L            0-55        

 

 Serum or plasma protein measurement (mass/volume)            5.9 g/dL         
   6.4-8.2        

 

 Serum or plasma albumin measurement (mass/volume)            3.0 g/dL         
   3.2-4.5        









 Serum or plasma uric acid measurement (mass/volume) - 16 02:20         









 Serum or plasma uric acid measurement (mass/volume)            6.0 mg/dL      
      2.6-7.2        









 Lactate dehydrogenase 1 [enzymatic activity/volume] in serum or plasma -  02:20         









 Lactate dehydrogenase 1 [enzymatic activity/volume] in serum or plasma        
    231 U/L            125-220        









 [id number] - 16 02:20         









 [id number]            SPECIMEN AVAILABLE             NRG        









 Urine protein/creatinine mass ratio - 16 12:50         









 Urine protein measurement (mass/volume)            < mg/dL            6-12    
    

 

 Urine creatinine measurement (mass/volume)            27 mg/dL            30-
125        

 

 Urine protein/creatinine mass ratio            TNP             NRG        









 Complete urinalysis with reflex to culture - 16 12:50         









 Urine color determination            YELLOW             NRG        

 

 Urine clarity determination            CLEAR             NRG        

 

 Urine pH measurement by test strip            7             5-9        

 

 Specific gravity of urine by test strip            1.005             1.016-
1.022        

 

 Urine protein assay by test strip, semi-quantitative            NEGATIVE      
       NEGATIVE        

 

 Urine glucose detection by automated test strip            NEGATIVE           
  NEGATIVE        

 

 Erythrocytes detection in urine sediment by light microscopy            
NEGATIVE             NEGATIVE        

 

 Urine ketones detection by automated test strip            NEGATIVE           
  NEGATIVE        

 

 Urine nitrite detection by test strip            NEGATIVE             NEGATIVE
        

 

 Urine total bilirubin detection by test strip            NEGATIVE             
NEGATIVE        

 

 Urine urobilinogen measurement by automated test strip (mass/volume)          
  NORMAL             NORMAL        

 

 Urine leukocyte esterase detection by dipstick            2+             
NEGATIVE        

 

 Automated urine sediment erythrocyte count by microscopy (number/high power 
field)            NONE             NRG        

 

 Automated urine sediment leukocyte count by microscopy (number/high power field
)             [HPF]            NRG        

 

 Bacteria detection in urine sediment by light microscopy            TRACE     
        NRG        

 

 Squamous epithelial cells detection in urine sediment by light microscopy     
       2-5             NRG        

 

 Crystals detection in urine sediment by light microscopy            NONE      
       NRG        

 

 Casts detection in urine sediment by light microscopy            NONE         
    NRG        

 

 Mucus detection in urine sediment by light microscopy            NEGATIVE     
        NRG        

 

 Complete urinalysis with reflex to culture            NO             NRG      
  









 Complete blood count (CBC) with automated white blood cell (WBC) differential 
- 16 13:06         









 Blood leukocytes automated count (number/volume)            11.6 10*3/uL      
      4.3-11.0        

 

 Blood erythrocytes automated count (number/volume)            4.23 10*6/uL    
        4.35-5.85        

 

 Venous blood hemoglobin measurement (mass/volume)            13.1 g/dL        
    11.5-16.0        

 

 Blood hematocrit (volume fraction)            38 %            35-52        

 

 Automated erythrocyte mean corpuscular volume            90 [foz_us]          
  80-99        

 

 Automated erythrocyte mean corpuscular hemoglobin (mass per erythrocyte)      
      31 pg            25-34        

 

 Automated erythrocyte mean corpuscular hemoglobin concentration measurement (
mass/volume)            34 g/dL            32-36        

 

 Automated erythrocyte distribution width ratio            12.4 %            
10.0-14.5        

 

 Automated blood platelet count (count/volume)            145 10*3/uL          
  130-400        

 

 Automated blood platelet mean volume measurement            11.2 [foz_us]     
       7.4-10.4        

 

 Automated blood neutrophils/100 leukocytes            66 %            42-75   
     

 

 Automated blood lymphocytes/100 leukocytes            24 %            12-44   
     

 

 Blood monocytes/100 leukocytes            9 %            0-12        

 

 Automated blood eosinophils/100 leukocytes            1 %            0-10     
   

 

 Automated blood basophils/100 leukocytes            0 %            0-10        

 

 Blood neutrophils automated count (number/volume)            7.6 10*3         
   1.8-7.8        

 

 Blood lymphocytes automated count (number/volume)            2.8 10*3         
   1.0-4.0        

 

 Blood monocytes automated count (number/volume)            1.0 10*3            
0.0-1.0        

 

 Automated eosinophil count            0.1 10*3/uL            0.0-0.3        

 

 Automated blood basophil count (count/volume)            0.0 10*3/uL          
  0.0-0.1        









 Comprehensive metabolic panel - 16 13:06         









 Serum or plasma sodium measurement (moles/volume)            137 mmol/L       
     135-145        

 

 Serum or plasma potassium measurement (moles/volume)            4.0 mmol/L    
        3.6-5.0        

 

 Serum or plasma chloride measurement (moles/volume)            109 mmol/L     
               

 

 Carbon dioxide            20 mmol/L            21-32        

 

 Serum or plasma anion gap determination (moles/volume)            8 mmol/L    
        5-14        

 

 Serum or plasma urea nitrogen measurement (mass/volume)            5 mg/dL    
        7-18        

 

 Serum or plasma creatinine measurement (mass/volume)            0.63 mg/dL    
        0.60-1.30        

 

 Serum or plasma urea nitrogen/creatinine mass ratio            8             
NRG        

 

 Serum or plasma creatinine measurement with calculation of estimated 
glomerular filtration rate            >             NRG        

 

 Serum or plasma glucose measurement (mass/volume)            71 mg/dL         
           

 

 Serum or plasma calcium measurement (mass/volume)            9.0 mg/dL        
    8.5-10.1        

 

 Serum or plasma total bilirubin measurement (mass/volume)            0.5 mg/dL
            0.1-1.0        

 

 Serum or plasma alkaline phosphatase measurement (enzymatic activity/volume)  
          251 U/L                    

 

 Serum or plasma aspartate aminotransferase measurement (enzymatic activity/
volume)            17 U/L            5-34        

 

 Serum or plasma alanine aminotransferase measurement (enzymatic activity/volume
)            13 U/L            0-55        

 

 Serum or plasma protein measurement (mass/volume)            6.0 g/dL         
   6.4-8.2        

 

 Serum or plasma albumin measurement (mass/volume)            3.1 g/dL         
   3.2-4.5        









 Serum or plasma uric acid measurement (mass/volume) - 16 13:06         









 Serum or plasma uric acid measurement (mass/volume)            6.4 mg/dL      
      2.6-7.2        









 Lactate dehydrogenase 1 [enzymatic activity/volume] in serum or plasma -  13:06         









 Lactate dehydrogenase 1 [enzymatic activity/volume] in serum or plasma        
    251 U/L            125-220        









 Blood type T Indirect antibody screen panel - 16 13:06         









 ABO+Rh group            BP             NRG        

 

 Transfusion band number            [id number]             NRG        

 

 Blood group antibody screen            NEGATIVE             NRG        









 Automated blood complete blood count (hemogram) panel - 16 08:47         









 Blood leukocytes automated count (number/volume)            12.2 10*3/uL      
      4.3-11.0        

 

 Blood erythrocytes automated count (number/volume)            4.21 10*6/uL    
        4.35-5.85        

 

 Venous blood hemoglobin measurement (mass/volume)            13.0 g/dL        
    11.5-16.0        

 

 Blood hematocrit (volume fraction)            38 %            35-52        

 

 Automated erythrocyte mean corpuscular volume            90 [foz_us]          
  80-99        

 

 Automated erythrocyte mean corpuscular hemoglobin (mass per erythrocyte)      
      31 pg            25-34        

 

 Automated erythrocyte mean corpuscular hemoglobin concentration measurement (
mass/volume)            34 g/dL            32-36        

 

 Automated erythrocyte distribution width ratio            12.5 %            
10.0-14.5        

 

 Automated blood platelet count (count/volume)            143 10*3/uL          
  130-400        

 

 Automated blood platelet mean volume measurement            11.4 [foz_us]     
       7.4-10.4        









 Comprehensive metabolic panel - 16 08:47         









 Serum or plasma sodium measurement (moles/volume)            136 mmol/L       
     135-145        

 

 Serum or plasma potassium measurement (moles/volume)            3.6 mmol/L    
        3.6-5.0        

 

 Serum or plasma chloride measurement (moles/volume)            108 mmol/L     
               

 

 Carbon dioxide            21 mmol/L            21-32        

 

 Serum or plasma anion gap determination (moles/volume)            7 mmol/L    
        5-14        

 

 Serum or plasma urea nitrogen measurement (mass/volume)            6 mg/dL    
        7-18        

 

 Serum or plasma creatinine measurement (mass/volume)            0.58 mg/dL    
        0.60-1.30        

 

 Serum or plasma urea nitrogen/creatinine mass ratio            10             
NRG        

 

 Serum or plasma creatinine measurement with calculation of estimated 
glomerular filtration rate            >             NRG        

 

 Serum or plasma glucose measurement (mass/volume)            93 mg/dL         
           

 

 Serum or plasma calcium measurement (mass/volume)            8.6 mg/dL        
    8.5-10.1        

 

 Serum or plasma total bilirubin measurement (mass/volume)            0.6 mg/dL
            0.1-1.0        

 

 Serum or plasma alkaline phosphatase measurement (enzymatic activity/volume)  
          248 U/L                    

 

 Serum or plasma aspartate aminotransferase measurement (enzymatic activity/
volume)            18 U/L            5-34        

 

 Serum or plasma alanine aminotransferase measurement (enzymatic activity/volume
)            14 U/L            0-55        

 

 Serum or plasma protein measurement (mass/volume)            5.6 g/dL         
   6.4-8.2        

 

 Serum or plasma albumin measurement (mass/volume)            2.9 g/dL         
   3.2-4.5        









 Serum or plasma uric acid measurement (mass/volume) - 16 08:47         









 Serum or plasma uric acid measurement (mass/volume)            6.2 mg/dL      
      2.6-7.2        









 Lactate dehydrogenase 1 [enzymatic activity/volume] in serum or plasma -  08:47         









 Lactate dehydrogenase 1 [enzymatic activity/volume] in serum or plasma        
    216 U/L            125-220        









 Complete blood count (CBC) with automated white blood cell (WBC) differential 
- 16 05:20         









 Blood leukocytes automated count (number/volume)            12.1 10*3/uL      
      4.3-11.0        

 

 Blood erythrocytes automated count (number/volume)            3.76 10*6/uL    
        4.35-5.85        

 

 Venous blood hemoglobin measurement (mass/volume)            11.7 g/dL        
    11.5-16.0        

 

 Blood hematocrit (volume fraction)            34 %            35-52        

 

 Automated erythrocyte mean corpuscular volume            90 [foz_us]          
  80-99        

 

 Automated erythrocyte mean corpuscular hemoglobin (mass per erythrocyte)      
      31 pg            25-34        

 

 Automated erythrocyte mean corpuscular hemoglobin concentration measurement (
mass/volume)            35 g/dL            32-36        

 

 Automated erythrocyte distribution width ratio            12.4 %            
10.0-14.5        

 

 Automated blood platelet count (count/volume)            129 10*3/uL          
  130-400        

 

 Automated blood platelet mean volume measurement            11.8 [foz_us]     
       7.4-10.4        

 

 Automated blood neutrophils/100 leukocytes            74 %            42-75   
     

 

 Automated blood lymphocytes/100 leukocytes            17 %            12-44   
     

 

 Blood monocytes/100 leukocytes            8 %            0-12        

 

 Automated blood eosinophils/100 leukocytes            1 %            0-10     
   

 

 Automated blood basophils/100 leukocytes            0 %            0-10        

 

 Blood neutrophils automated count (number/volume)            8.9 10*3         
   1.8-7.8        

 

 Blood lymphocytes automated count (number/volume)            2.1 10*3         
   1.0-4.0        

 

 Blood monocytes automated count (number/volume)            1.0 10*3            
0.0-1.0        

 

 Automated eosinophil count            0.1 10*3/uL            0.0-0.3        

 

 Automated blood basophil count (count/volume)            0.0 10*3/uL          
  0.0-0.1        



                                                                               
                                                                             



Encounters

      





 ACCT No.            Visit Date/Time            Discharge            Status    
        Pt. Type            Provider            Facility            Loc./Unit  
          Complaint        

 

 [id number]            2015 09:30:00            2015 23:59:59          
  CLS            Outpatient            BUCKY VALENCIA                    
                           

 

 [id number]            10/02/2013 09:16:00            10/02/2013 23:59:59          
  CLS            Outpatient            ROCHELLEBUCKY MENDOZA                    
                           

 

 [id number]            2013 11:13:00                                      
Document Registration                                                          
  

 

 [id number]            10/14/2016 05:06:00                                   
   Document Registration                                                       
     

 

 [id number]            2016 13:06:00                                   
   Document Registration                                                       
     

 

 [id number]            2016 10:06:00                                   
   Document Registration                                                       
     

 

 [id number]            2016 08:06:00                                   
   Document Registration                                                       
     

 

 [id number]            2016 07:05:00                                   
   Document Registration                                                       
     

 

 [id number]            12/15/2016 13:05:00                                   
   Document Registration                                                       
     

 

 [id number]            10/05/2016 13:06:00                                   
   Document Registration                                                       
     

 

 [id number]            10/20/2016 18:05:00                                   
   Document Registration                                                       
     

 

 [id number]            10/23/2016 13:05:00                                   
   Document Registration                                                       
     

 

 [id number]            2017 09:05:00            2017 23:59:59          
  CLS            Outpatient            CHARY CAMP LAC                       
  [facility]                     

 

 [id number]            2016 13:06:00                                   
   Document Registration                                                       
     

 

 [id number]            10/08/2016 08:07:00                                   
   Document Registration                                                       
     

 

 [id number]            11/10/2016 13:06:00                                   
   Document Registration                                                       
     

 

 [id number]            10/30/2017 21:08:00            10/30/2017 22:01:00    
        DIS            Outpatient            LISBETH KEARNEY            [facility] 
[facility] [address]            ER            COUGH/SOB/VOMITING        

 

 [id number]            2017 10:45:00            2017 23:59:59    
        CLS            Preadmit            CLAUDY MCCOLLUM MD            [facility] 
[facility] [address]            RAD            ABNORMAL QUAD SCREN     
   

 

 [id number]            2016 10:45:00            2017 00:01:00    
        DIS            Outpatient            CLAUDY MCCOLLUM MD            [facility] 
[facility] [address]            RAD            ABNORMAL QUAD SCREN     
   

 

 [id number]            2016 12:39:00            2016 15:40:00    
        DIS            Inpatient            CLAUDY MCCOLLUM MD            [facility] 
[facility] [address]            LDRP            INDUCTION        

 

 [id number]            2016 01:15:00            2016 10:20:00    
        DIS            Outpatient            JAVIER CHOPRA MD            [facility] 
[facility] [address]            WSo            PAIN,HTN,VOMITING        

 

 [id number]            2016 08:20:00            2016 09:49:00    
        DIS            Emergency            BRANDEN FELIZ MD            
[facility] [address]            ER            COUGH/SORE THROAT 
VOMITING        

 

 [id number]            2016 21:25:00            2016 23:45:00    
        DIS            Outpatient            JJ OVIEDO DO            [facility] 
[facility] [address]            WSo            PAIN        

 

 [id number]            2016 11:16:00            2016 23:59:59    
        CLS            Outpatient            CLAUDY MCCOLLUM MD            [facility] 
[facility] [address]            RAD            ABNORMAL QUAD SCREEN    
    

 

 [id number]            2016 10:36:00            2016 13:30:00    
        DIS            Emergency            LISBETH KEARNEY APRN            [facility]ia 
[facility] [address]            ER            VOMITING DENTAL PAIN 32 
WKS PREG        

 

 [id number]            10/21/2016 12:18:00            10/21/2016 23:59:59    
        CLS            Outpatient            CLAUDY MCCOLLUM MD            [facility] 
[facility] [address]            RAD            PLACENTA LOCATION,THIRD 
TRIMESTER BLEEDING        

 

 [id number]            10/17/2016 21:20:00            10/17/2016 22:47:00    
        DIS            Outpatient            JJ OVIEDO DO            [facility] 
[facility]            SPOTTING        

 

 [id number]            10/11/2016 18:04:00            10/11/2016 22:20:00    
        DIS            Outpatient            CLAUDY MCCOLLUM MD            [facility] 
[facility] [address]            WSo            FALL        

 

 [id number]            2016 10:03:00            2016 23:59:59    
        CLS            Outpatient            CLAUDY MCCOLLUM MD            [facility] 
[facility] [address]            RAD            EVALUATE ANATOMY NOT 
SEEN ON PRIOR SONOGRAM        

 

 [id number]            2016 10:05:00            2016 23:59:59    
        CLS            Outpatient            CLAUDY MCCOLLUM MD            [facility] 
[facility] [address]            RAD            Z34        

 

 [id number]            2016 16:38:00            2016 23:59:59    
        CLS            Outpatient            CLAUDY MCCOLLUM MD            [facility] 
[facility] [address]            RAD            STAT UNABLE TO DOPPLER 
HEART TONES AT REPORTED 20        

 

 [id number]            2016 20:38:00            2016 01:14:00    
        DIS            Emergency            STEFANO ALLEN            
[facility] [address]            ER            BACK PAIN/ABD PAIN/
VOMITING        

 

 [id number]            2015 11:18:00            2015 12:48:00    
        DIS            Emergency            RANJIT LOPEZ, [place]            [facility]ia 
[facility] [address]            ER            ABD PAIN        

 

 [id number]            2013 11:27:00            2013 23:59:59    
        CLS            Outpatient                                              
              

 

 [id number]            2012 19:40:00                                   
   Document Registration

## 2018-11-07 NOTE — XMS REPORT
[facility]

 Created on: 2017



Jr Rico

External Reference #: [id number]

: 1990

Sex: Female



Demographics







 Address  [address] [place]  [address]

 

 Preferred Language  Unknown

 

 Marital Status  Unknown

 

 [employer] Affiliation  Unknown

 

 Race  Unknown

 

 Ethnic Group  Unknown





Author







 Author  CLAUDY MCCOLLUM

 

 [facility]

 

 Address  [address] [place]  [address]



 

 Phone  [phone]







Care Team Providers







 Care Team Member Name  Role  Phone

 

 CLAUDY MCCOLLUM  Unavailable  [phone]







PROBLEMS







 Type  Condition  ICD9-CM Code  OIF08-YY Code  Onset Dates  Condition Status  
SNOMED Code

 

 Problem  Generalized anxiety disorder     F41.1     Active  88208262

 

 Problem  Anxiety, generalized     F41.1     Active  08491815

 

 Problem  Postpartum depression     F53     Active  58710116

 

 Problem  Patient is a currently breast-feeding mother     Z39.1     Active  
[id number]

 

 Problem  History of gestational hypertension     Z87.59     Active  [id number]







ALLERGIES

No Information



SOCIAL HISTORY

Never Assessed



PLAN OF CARE





VITAL SIGNS





MEDICATIONS

Unknown Medications



RESULTS

No Results



PROCEDURES

No Known procedures



IMMUNIZATIONS

No Known Immunizations

## 2019-01-14 ENCOUNTER — HOSPITAL ENCOUNTER (EMERGENCY)
Dept: HOSPITAL 75 - ER | Age: 29
Discharge: HOME | End: 2019-01-14
Payer: SELF-PAY

## 2019-01-14 VITALS — DIASTOLIC BLOOD PRESSURE: 101 MMHG | SYSTOLIC BLOOD PRESSURE: 141 MMHG

## 2019-01-14 VITALS — WEIGHT: 248 LBS | BODY MASS INDEX: 33.59 KG/M2 | HEIGHT: 72 IN

## 2019-01-14 DIAGNOSIS — Z80.0: ICD-10-CM

## 2019-01-14 DIAGNOSIS — A08.4: ICD-10-CM

## 2019-01-14 DIAGNOSIS — Z87.09: ICD-10-CM

## 2019-01-14 DIAGNOSIS — F17.210: ICD-10-CM

## 2019-01-14 DIAGNOSIS — F31.9: ICD-10-CM

## 2019-01-14 DIAGNOSIS — F41.9: ICD-10-CM

## 2019-01-14 DIAGNOSIS — J06.9: Primary | ICD-10-CM

## 2019-01-14 DIAGNOSIS — Z82.49: ICD-10-CM

## 2019-01-14 PROCEDURE — 99283 EMERGENCY DEPT VISIT LOW MDM: CPT

## 2019-01-14 NOTE — ED COUGH/URI
General


Chief Complaint:  Abdominal/GI Problems


Stated Complaint:  N/V/D;COUGH


Source:  patient


Exam Limitations:  no limitations





History of Present Illness


Date Seen by Provider:  Jan 14, 2019


Time Seen by Provider:  08:34


Initial Comments


Patient presents to ER by private conveyance with chief complaint she was sent 

home from work because of nausea and vomiting. She's been having a cough for 

the past 2-3 weeks. She's been having vomiting and diarrhea for the past 3 or 4 

days. She says both of her children and her roommate's children and her 

roommates as well have all been sick with the same kind of viral complaint for 

the past couple weeks and they've all been spreading back and forth to each 

other. She's been using Lysol and sanitation wipes around the house. She's had 

no fevers or chills. She's had occasional bronchitis but no history of asthma 

COPD. She does smoke cigarettes but she's reduced recently. She has no other 

significant medical history, abdominal pain or abdominal surgeries. She's not 

used anything to stop the diarrhea. She has cough drops at home but she's been 

using with good effect.





Allergies and Home Medications


Allergies


Coded Allergies:  


     No Known Drug Allergies (Unverified , 5/3/12)





Home Medications


Azithromycin 250 Mg Tablet, 250 MG PO UD


   TAKE 2 TABLETS ON DAY ONE THEN TAKE 1 TABLET DAILY FOR FOUR MORE DAYS 


   Prescribed by: LISBETH KEARNEY on 10/30/17 2142


D-Methorphan Hb/P-Epd HCl/Bpm 118 Ml Syrup, 5 ML PO Q4H PRN for COUGH


   Prescribed by: LISBETH KEARNEY on 10/30/17 2142


Hydrocodone Bit/Acetaminophen 1 Each Tablet, 1-2 TAB PO Q4H PRN for PAIN


   Prescribed by: CLAUDY MCCOLLUM on 12/22/16 1000


Nifedipine 60 Mg Tab.er.24, 60 MG PO DAILY


   Prescribed by: CLAUDY MCCOLLUM on 12/22/16 1000


Nitrofurantoin Monohyd/M-Cryst 100 Mg Capsule, 1 TAB PO BID


   Prescribed by: ERICA MOHAN on 11/7/18 1249


Prenatal Vit/Iron Fumarate/FA 1 Each Tablet, 1 EACH PO DAILY, (Reported)





Patient Home Medication List


Home Medication List Reviewed:  Yes





Review of Systems


Review of Systems


Constitutional:  No chills, No diaphoresis; malaise


EENTM:  No ear discharge, No ear pain


Respiratory:  cough, phlegm; No short of breath, No wheezing


Cardiovascular:  No chest pain, No edema


Gastrointestinal:  No abdominal pain, No constipation; diarrhea, nausea, 

vomiting


Genitourinary:  No discharge, No dysuria


Pregnant:  No


LMP:  Dec 17, 2018


Musculoskeletal:  No back pain, No joint pain


Skin:  No pruritus, No rash





Past Medical-Social-Family Hx


Patient Social History


Alcohol Use:  Denies Use


Recreational Drug Use:  No


Smoking Status:  Current Everyday Smoker


Type Used:  Cigarettes


Recent Hopitalizations:  No





Immunizations Up To Date


Tetanus Booster (TDap):  Unknown


PED Vaccines UTD:  No


Date of Influenza Vaccine:  Sep 23, 2016





Seasonal Allergies


Seasonal Allergies:  Yes





Past Medical History


Surgeries:  No


Respiratory:  No


Cardiac:  No


Neurological:  No


Reproductive Disorders:  No


Sexually Transmitted Disease:  No


HIV/AIDS:  No


Gastrointestinal:  No


Musculoskeletal:  No


Endocrine:  No


Cancer:  No


Psychosocial:  Yes


Anxiety, Bipolar, Depression


Integumentary:  No


Blood Disorders:  No


Adverse Reaction/Blood Tranf:  No





Family Medical History





Asthma


  19 MOTHER


  G8 SISTER


  Maternal grandmother


Colon cancer


  Maternal grandfather


  Paternal grandmother


Diabetes mellitus


  Maternal grandfather


Hypertension


  Maternal grandfather


  Maternal grandmother


No Pertinent Family Hx





Physical Exam


Capillary Refill :


Height: 6'0.00"


Weight: 248lbs. 0.0oz. [phone]; 39.2 BMI


Method:Stated


General Appearance:  WD/WN, no apparent distress


Eyes:  Bilateral Eye Normal Inspection, Bilateral Eye PERRL, Bilateral Eye EOMI


HEENT:  PERRL/EOMI, normal ENT inspection, TMs normal, pharynx normal


Neck:  non-tender, full range of motion, supple, normal inspection


Respiratory:  chest non-tender, lungs clear, normal breath sounds, no 

respiratory distress, no accessory muscle use


Cardiovascular:  normal peripheral pulses, regular rate, rhythm, no edema


Gastrointestinal:  normal bowel sounds, non tender, soft


Extremities:  no pedal edema, normal capillary refill


Neurologic/Psychiatric:  alert, normal mood/affect, oriented x 3


Skin:  normal color, warm/dry





Progress/Results/Core Measures


Suspected Sepsis


SIRS


Temperature: 


Pulse:  


Respiratory Rate: 


 


Blood Pressure  / 


Mean:





Results/Orders


My Orders





Orders - JEANETTE GONZALEZ


Ondansetron  Oral Dissolve Tab (Zofran (1/14/19 08:45)





Vital Signs/I&O


Capillary Refill :


Progress Note :  


   Time:  08:44


Progress Note


Patient does not have an acute abdomen nor she febrile, tachycardic, tachypneic 

nor having any wheezing or crackles auscultated. And treat this like the most 

likely cause being a virus encourage her to use good disinfectants, cough 

medicine, vapor rubs etc. We'll provide her with some nausea medicine and 

encourage her to use Imodium since she's been having diarrhea for 3 days.





Departure


Impression





 Primary Impression:  


 Viral URI with cough


 Additional Impression:  


 Gastroenteritis and colitis, viral


Disposition:  01 HOME, SELF-CARE


Condition:  Stable





Departure-Patient Inst.


Decision time for Depature:  08:45


Referrals:  


CLAUDY MCCOLLUM MD (PCP/Family)


Primary Care Physician


Patient Instructions:  Viral Gastroenteritis, Adult (DC), Viral Upper 

Respiratory Infection, Adult (DC)





Add. Discharge Instructions:  


For your cough use humidifiers, vapor rubs such as Vicks or Mentholatum, hot 

tea with lemon or honey, cough drops, throat lozenges. You can also use Tylenol 

and/or Motrin for body aches or chills. Drink plenty of fluids. Use the Zofran 

1 tablet every 6 hours under the tongue and allowed to absorb your mouth as 

necessary if you have nausea or vomiting. If her diarrhea persists take 2 

tablets of Imodium followed by one more tablet every 4 hours if you still have 

watery diarrhea. If your symptoms are not resolving the next 7-10 days follow-

up with primary care for reevaluation.


All discharge instructions reviewed with patient and/or family. Voiced 

understanding.


Scripts


Ondansetron (Ondansetron Odt) 4 Mg Tab.rapdis


4 MG PO Q6H PRN for NAUSEA/VOMITING, #8 TAB 0 Refills


   Prov: JEANETTE GONZALEZ         1/14/19


Work/School Note:  Work Release Form   Date Seen in the Emergency Department:  

Jan 14, 2019


   Return to Work:  Jan 17, 2019


   Restrictions:  No Restrictions











JEANETTE GONZALEZ Jan 14, 2019 08:47

## 2019-01-14 NOTE — XMS REPORT
Continuity of Care Document

 Created on: 2019



DAMIAN KEN

External Reference #: [id number]

: 1990

Sex: Female



Demographics







 Address  [address]

[place]  [address]

 

 Home Phone  [phone] x

 

 Preferred Language  Unknown

 

 Marital Status  Unknown

 

 Adventism Affiliation  Unknown

 

 Race  Unknown

 

 Ethnic Group  Unknown





Author







 Author  [facility]

 

 Address  Unknown

 

 Phone  Unavailable



              



Allergies

      





 Active            Description            Code            Type            
Severity            Reaction            Onset            Reported/Identified   
         Relationship to Patient            Clinical Status        

 

 Yes            No Known Drug Allergies            [id number]            Drug 
Allergy            Unknown            N/A                         2012   
                               



                  



Medications

      



There is no data.                  



Problems

      





 Date Dx Coded            Attending            Type            Code            
Diagnosis            Diagnosed By        

 

 2011                                      626.4            IRREGULAR 
MENSTRUAL CYCLE                     

 

 2011                                      V65.45            STD 
COUNSELING                     

 

 2011                                      V72.31            GYN EXAM, 
ROUTINE                     

 

 2011                                      V74.5            STD SCREEN   
                  

 

 2011            BUCKY VALENCIA                         626.4      
      IRREGULAR MENSTRUAL CYCLE                     

 

 2011            BUCKY VALENCIA A                         V65.45     
       STD COUNSELING                     

 

 2011            BUCKY VALENCIA A                         V72.31     
       GYN EXAM, ROUTINE                     

 

 2011            KASSANDRA VALENCIAIDI A                         V74.5      
      STD SCREEN                     

 

 2011            BUCKY VALENCIA A                         626.4      
      IRREGULAR MENSTRUAL CYCLE                     

 

 2011            KASSANDRA VALENCIAIDI A                         V65.45     
       STD COUNSELING                     

 

 2011            BUCKY VALENCIA A                         V72.31     
       GYN EXAM, ROUTINE                     

 

 2011            BUCKY VALENCIA A                         V74.5      
      STD SCREEN                     

 

 2012                         Ot            305.20            CANNABIS 
ABUSE-UNSPEC                     

 

 2012                         Ot            780.2            SYNCOPE AND 
COLLAPSE                     

 

 2012                         Ot            923.11            CONTUSION 
OF ELBOW                     

 

 2012                         Ot            E000.0            CIVILIAN 
ACTIVITY DONE FOR INCOME OR PAY                     

 

 2012                         Ot            E849.6            ACCIDENT IN 
PUBLIC BLDG                     

 

 2012                         Ot            E888.9            FALL NOS   
                  

 

 2013                                      564.00            CONSTIPATION
                     

 

 2013                                      789.04            ABDOMINAL 
PAIN LEFT LOWER QUADRANT                     

 

 2013            BUCKY VALENCIA                         564.00     
       CONSTIPATION                     

 

 2013            ROCHELLE APRN, BUCKY A                         789.04     
       ABDOMINAL PAIN LEFT LOWER QUADRANT                     

 

 2013            ROCHELLE VENTURA, BUCKY A                         564.00     
       CONSTIPATION                     

 

 2013            ROCHELLE VENTURA, BUCKY A                         789.04     
       ABDOMINAL PAIN LEFT LOWER QUADRANT                     

 

 10/02/2013            ROCHELLE CMN, BUCKY A                         131.01     
       TRICHOMONAL VULVOVAGINITIS                     

 

 10/02/2013            ROCHELLE VENTURA, BUCKY A                         V76.10     
       BREAST CANCER SCREENING                     

 

 10/02/2013            ROCHELLE CMN, BUCKY A                         V76.2      
      CERVICAL CANCER SCREENING (PAP SMEAR)                     

 

 10/02/2013            ROCHELLE CMN, BUCKY A                         131.01     
       TRICHOMONAL VULVOVAGINITIS                     

 

 10/02/2013            ROCHELLE VENTURA, BUCKY A                         V76.10     
       BREAST CANCER SCREENING                     

 

 10/02/2013            ROCHELLE VENTURA, BUCKY A                         V76.2      
      CERVICAL CANCER SCREENING (PAP SMEAR)                     

 

 2015            ROCHELLE VENTURA, BUCKY A                         611.71     
       MASTODYNIA                     

 

 2015            ROCHELLE VENTURA, BUCKY A                         787.01     
       NAUSEA WITH VOMITING                     

 

 2015            ROCHELLE VENTURA, BUCKY A                         787.91     
       DIARRHEA                     

 

 2015            ROCHELLE VENTURA, BUCKY A                         V73.81     
       HPV SCREENING                     

 

 2015            RANJIT LOPEZ, [place]            Ot            F17.210     
       NICOTINE DEPENDENCE, CIGARETTES, UNCOMPL                     

 

 2015            RANJIT LOPEZ, [place]            Ot            R10.11      
      RIGHT UPPER QUADRANT PAIN                     

 

 2016            STEFANO ALLEN            Ot            J40      
      BRONCHITIS, NOT SPECIFIED AS ACUTE OR CH                     

 

 2016            STEFANO ALLEN            Ot            R10.31   
         RIGHT LOWER QUADRANT PAIN                     

 

 2016            CLAUDY MCCOLLUM MD            Ot            Z33.1      
      PREGNANT STATE, INCIDENTAL                     

 

 2016            CLAUDY MCCOLLUM MD            Ot            Z3A.08     
       8 WEEKS GESTATION OF PREGNANCY                     

 

 2016            CLAUDY MCCOLLUM MD            Ot            Z33.1      
      PREG[place]NT STATE, INCIDENTAL                     

 

 2016            CLAUDY MCCOLLUM MD            Ot            Z3A.08     
       8 WEEKS GESTATION OF PREGNANCY                     

 

 2016            CLAUDY MCCOLLUM MD            Ot            Z33.1      
      [place]REG[place], INCIDENTAL                     

 

 2016            CLAUDY MCCOLLUM MD            Ot            Z3A.08     
       8 WEEKS GESTATION OF PREGNANCY                     

 

 2016            CLAUDY MCCOLLUM MD, Ot            Z34.00     
       ENCNTR FOR SUPRVSN OF NORMAL FIRST PREGN                     

 

 2016            CLAUDY MCCOLLUM MD            Ot            Z34.82     
       ENCOUNTER FOR SUPRVSN OF NORMAL PREGNANC                     

 

 2016            CLAUDY MCCOLLUM MD            Ot            Z36        
    ENCOUNTER FOR  SCREENING OF MOT                     

 

 2016            CLAUDY MCCOLLUM MD            Ot            Z3A.22     
       22 WEEKS GESTATION OF PREGNANCY                     

 

 2016            CLAUDY MCCOLLUM MD            Ot            Z34.82     
       ENCOUNTER FOR SUPRVSN OF NORMAL PREGNANC                     

 

 2016            CLAUDY MCCOLLUM MD            Ot            Z36        
    ENCOUNTER FOR  SCREENING OF MOT                     

 

 2016            CLAUDY MCCOLLUM MD, Ot            Z3A.22     
       22 WEEKS GESTATION OF PREGNANCY                     

 

 10/11/2016            CLAUDY MCCOLLUM MD, Ot            Z33.1      
      [place]REG[place], INCIDENTAL                     

 

 10/11/2016            CLAUDY MCCOLLUM MD, Ot            Z3A.08     
       8 WEEKS GESTATION OF PREGNANCY                     

 

 10/11/2016            CLAUDY MCCOLLUM MD, Ot            Z34.00     
       ENCNTR FOR SUPRVSN OF NORMAL FIRST PREGN                     

 

 10/11/2016            CLAUDY MCCOLLUM MD            Ot            Z34.82     
       ENCOUNTER FOR SUPRVSN OF NORMAL PREGNANC                     

 

 10/11/2016            CLAUDY MCCOLLUM MD            Ot            Z36        
    ENCOUNTER FOR  SCREENING OF MOT                     

 

 10/11/2016            CLAUDY MCCOLLUM MD            Ot            Z3A.22     
       22 WEEKS GESTATION OF PREGNANCY                     

 

 10/11/2016            CLAUDY MCCOLLUM MD, Ot            O9A.213    
        INJ/POISN/OTH CONSEQ OF EXTERNAL CAUSES                      

 

 10/11/2016            CLAUDY MCCOLLUM MD            Ot            R55        
    SYNCOPE AND COLLAPSE                     

 

 10/11/2016            CLAUDY MCCOLLUM MD, Ot            W19.XXXA   
         UNSPECIFIED FALL, INITIAL ENCOUNTER                     

 

 10/11/2016            CLAUDY MCCOLLUM MD            Ot            Y99.8      
      OTHER EXTERNAL CAUSE STATUS                     

 

 10/11/2016            CLAUDY MCCOLLUM MD            Ot            Z3A.28     
       28 WEEKS GESTATION OF PREGNANCY                     

 

 10/17/2016            JJ OVIEDO DO            Ot            O26.853       
     SPOTTING COMPLICATING PREGNANCY, THIRD T                     

 

 10/17/2016            JJ OVIEDO DO            Ot            Z3A.28        
    28 WEEKS GESTATION OF PREGNANCY                     

 

 10/20/2016            CHUN MD, CLAUDY N            Ot            Z33.1      
      [place]REG[place], INCIDENTAL                     

 

 10/20/2016            CLAUDY MCCOLLUM MD            Ot            Z3A.08     
       8 WEEKS GESTATION OF PREGNANCY                     

 

 10/20/2016            CLAUDY MCCOLLUM MD            Ot            Z34.00     
       ENCNTR FOR SUPRVSN OF NORMAL FIRST PREGN                     

 

 10/20/2016            CLAUDY MCCOLLUM MD            Ot            Z34.82     
       ENCOUNTER FOR SUPRVSN OF NORMAL PREGNANC                     

 

 10/20/2016            CLAUDY MCCOLLUM MD            Ot            Z36        
    ENCOUNTER FOR  SCREENING OF MOT                     

 

 10/20/2016            CLAUDY MCCOLLUM MD            Ot            Z3A.22     
       22 WEEKS GESTATION OF PREGNANCY                     

 

 10/24/2016            CLAUDY MCCOLLUM MD            Ot            O46.93     
       ANTEPARTUM HEMORRHAGE, UNSPECIFIED, THIR                     

 

 10/24/2016            CLAUDY MCCOLLUM MD            Ot            Z3A.00     
       WEEKS OF GESTATION OF PREGNANCY NOT SPEC                     

 

 10/26/2016            CLAUDY MCCOLLUM MD            Ot            O46.93     
       ANTEPARTUM HEMORRHAGE, UNSPECIFIED, THIR                     

 

 10/26/2016            CLAUDY MCCOLLUM MD            Ot            Z3A.00     
       WEEKS OF GESTATION OF PREGNANCY NOT SPEC                     

 

 2016            PREET BREWSTER JJ MACO            Ot            O26.853       
     SPOTTING COMPLICATING PREGNANCY, THIRD T                     

 

 2016            PREET BREWSTER JJ K            Ot            Z3A.28        
    28 WEEKS GESTATION OF PREGNANCY                     

 

 2016            CLAUDY MCCOLLUM MD            Ot            Z33.1      
      [place]REG[place], INCIDENTAL                     

 

 2016            CLAUDY MCCOLLUM MD            Ot            Z3A.08     
       8 WEEKS GESTATION OF PREGNANCY                     

 

 2016            CLAUDY MCCOLLUM MD            Ot            Z34.00     
       ENCNTR FOR SUPRVSN OF NORMAL FIRST PREGN                     

 

 2016            CLAUDY MCCOLLUM MD            Ot            Z34.82     
       ENCOUNTER FOR SUPRVSN OF NORMAL PREGNANC                     

 

 2016            CLAUDY MCCOLLUM MD            Ot            Z36        
    ENCOUNTER FOR  SCREENING OF MOT                     

 

 2016            CLAUDY MCCOLLUM MD            Ot            Z3A.22     
       22 WEEKS GESTATION OF PREGNANCY                     

 

 2016            CLAUDY MCCOLLUM MD            Ot            O46.93     
       ANTEPARTUM HEMORRHAGE, UNSPECIFIED, THIR                     

 

 2016            CLAUDY MCCOLLUM MD            Ot            Z3A.00     
       WEEKS OF GESTATION OF PREGNANCY NOT SPEC                     

 

 2016            LISBETH KEARNEY APRN            Ot            F17.210    
        NICOTINE DEPENDENCE, CIGARETTES, UNCOMPL                     

 

 2016            LISBETH KEARNEY APRN            Ot            K04.7      
      PERIAPICAL ABSCESS WITHOUT SINUS                     

 

 2016            LISBETH KEARNEY APRN            Ot            O23.43     
       UNSP INFCT OF URINARY TRACT IN PREGNANCY                     

 

 2016            LISBETH KEARNEY            Ot            O26.893    
        OTH PREGNANCY RELATED CONDITIONS, THIRD                      

 

 2016            LISBETH KEARNEY APRN            Ot            O99.333    
        SMOKING (TOBACCO) COMPLICATING PREGNANCY                     

 

 2016            LISBETH KEARNEY APRN            Ot            O99.89     
       OTH DISEASES AND CONDITIONS COMPL PREG/C                     

 

 2016            LISBETH KEARNEY APRN            Ot            R10.30     
       LOWER ABDOMINAL PAIN, UNSPECIFIED                     

 

 2016            LISBETH KEARNEY APRN            Ot            R11.2      
      NAUSEA WITH VOMITING, UNSPECIFIED                     

 

 2016            LISBETH KEARNEY            Ot            Z3A.31     
       31 WEEKS GESTATION OF PREGNANCY                     

 

 2016            LISBETH KEARNEY APRN            Ot            F17.210    
        NICOTINE DEPENDENCE, CIGARETTES, UNCOMPL                     

 

 2016            LISBETH KEARNEY            Ot            K04.7      
      PERIAPICAL ABSCESS WITHOUT SINUS                     

 

 2016            LISBETH KEARNEY APRN            Ot            O23.43     
       UNSP INFCT OF URINARY TRACT IN PREGNANCY                     

 

 2016            LISBETH KEARNEY            Ot            O26.893    
        OT PREGNANCY RELATED CONDITIONS, THIRD                      

 

 2016            LISBETH KEARNEY            Ot            O99.333    
        SMOKING (TOBACCO) COMPLICATING PREGNANCY                     

 

 2016            LISBETH KEARNEY APRN            Ot            O99.89     
       OTH DISEASES AND CONDITIONS COMPL PREG/C                     

 

 2016            LISBETH KEARNEY APRN            Ot            R10.30     
       LOWER ABDOMINAL PAIN, UNSPECIFIED                     

 

 2016            LISBETH KEARNEY APRKATTY            Ot            R11.2      
      NAUSEA WITH VOMITING, UNSPECIFIED                     

 

 2016            LISBETH KEARNEY            Ot            Z3A.31     
       31 WEEKS GESTATION OF PREGNANCY                     

 

 2016            CLAUDY MCCOLLUM MD            Ot            O46.93     
       ANTEPARTUM HEMORRHAGE, UNSPECIFIED, THIR                     

 

 2016            CLAUDY MCCOLLUM MD            Ot            Z3A.00     
       WEEKS OF GESTATION OF PREGNANCY NOT SPEC                     

 

 2016            CLAUDY MCCOLLUM MD            Ot            Z34.82     
       ENCOUNTER FOR SUPRVSN OF NORMAL PREGNANC                     

 

 2016            CLAUDY MCCOLLUM MD            Ot            Z36        
    ENCOUNTER FOR  SCREENING OF MOT                     

 

 2016            CLAUDY MCCOLLUM MD            Ot            Z3A.22     
       22 WEEKS GESTATION OF PREGNANCY                     

 

 2016            CLAUDY MCCOLLUM MD, Ot            Z34.82     
       ENCOUNTER FOR SUPRVSN OF NORMAL PREGNANC                     

 

 2016            CLAUDY MCCOLLUM MD, Ot            Z36        
    ENCOUNTER FOR  SCREENING OF MOT                     

 

 2016            CLAUDY MCCOLLUM MD, Ot            Z3A.22     
       22 WEEKS GESTATION OF PREGNANCY                     

 

 2016            CLAUDY MCCOLLUM MD            Ot            O28.0      
      ABNORMAL HEMATOLOG FINDING ON                       

 

 2016            OVIEDO DO, JJ K            Ot            O47.03        
    FALSE LABOR BEFORE 37 COMPLETED WEEKS OF                     

 

 2016            OVIEDO DO, JJ K            Ot            Z3A.35        
    35 WEEKS GESTATION OF PREGNANCY                     

 

 2016            CLAUDY MCCOLLUM MD            Ot            O28.0      
      ABNORMAL HEMATOLOG FINDING ON                       

 

 2016            CLAUDY MCCOLLUM MD            Ot            Z3A.34     
       34 WEEKS GESTATION OF PREGNANCY                     

 

 2016            BRANDEN FELIZ MD            Ot            J02.9   
         ACUTE PHARYNGITIS, UNSPECIFIED                     

 

 2016            BRANDEN FELIZ MD            Ot            J06.9   
         ACUTE UPPER RESPIRATORY INFECTION, UNSPE                     

 

 2016            BRANDEN FELIZ MD            Ot            O14.93  
          UNSPECIFIED PRE-ECLAMPSIA, THIRD TRIMEST                     

 

 2016            BRANDEN FELIZ MD            Ot            O99.333 
           SMOKING (TOBACCO) COMPLICATING PREGNANCY                     

 

 2016            BRANDEN FELIZ MD            Ot            O99.513 
           DISEASES OF THE RESP SYS COMP PREGNANCY,                     

 

 2016            BRANDEN FELIZ MD            Ot            J02.9   
         ACUTE PHARYNGITIS, UNSPECIFIED                     

 

 2016            BRANDEN FELIZ MD, Ot            J06.9   
         ACUTE UPPER RESPIRATORY INFECTION, UNSPE                     

 

 2016            BRANDEN FELIZ MD            Ot            O14.93  
          UNSPECIFIED PRE-ECLAMPSIA, THIRD TRIMEST                     

 

 2016            BRANDEN FELIZ MD            Ot            O99.333 
           SMOKING (TOBACCO) COMPLICATING PREGNANCY                     

 

 2016            BRANDEN FELIZ MD            Ot            O99.513 
           DISEASES OF THE RESP SYS COMP PREGNANCY,                     

 

 2016            CLAUDY MCCOLLUM MD            Ot            O28.0      
      ABNORMAL HEMATOLOG FINDING ON                       

 

 2016            CLAUDY MCCOLLUM MD            Ot            Z3A.34     
       34 WEEKS GESTATION OF PREGNANCY                     

 

 2016            JAVIER CHOPRA MD, Ot            F17.210     
       NICOTINE DEPENDENCE, CIGARETTES, UNCOMPL                     

 

 2016            JAVIER CHOPRA MD            Ot            O14.03      
      MILD TO MODERATE PRE-ECLAMPSIA, THIRD TR                     

 

 2016            JAVIER CHOPRA MD            Ot            O99.333     
       SMOKING (TOBACCO) COMPLICATING PREGNANCY                     

 

 2016            JAVIER CHOPRA MD, Ot            Z3A.37      
      37 WEEKS GESTATION OF PREGNANCY                     

 

 2016            JAVIER CHOPRA MD, Ot            F17.210     
       NICOTINE DEPENDENCE, CIGARETTES, UNCOMPL                     

 

 2016            JAVIER CHOPRA MD, Ot            O14.03      
      MILD TO MODERATE PRE-ECLAMPSIA, THIRD TR                     

 

 2016            JAVIER CHOPRA MD, Ot            O99.333     
       SMOKING (TOBACCO) COMPLICATING PREGNANCY                     

 

 2016            JAVIER CHOPRA MD, Ot            Z3A.37      
      37 WEEKS GESTATION OF PREGNANCY                     

 

 2016            CLAUDY MCCOLLUM MD            Ot            O28.0      
      ABNORMAL HEMATOLOG FINDING ON                       

 

 2016            CLAUDY MCCOLLUM MD            Ot            Z3A.34     
       34 WEEKS GESTATION OF PREGNANCY                     

 

 2016            CLAUDY MCCOLLUM MD            Ot            O28.0      
      ABNORMAL HEMATOLOG FINDING ON                       

 

 2016            CLAUDY MCCOLLUM MD            Ot            Z3A.34     
       34 WEEKS GESTATION OF PREGNANCY                     

 

 2016            JJ OVIEDO DO            Ot            O47.03        
    FALSE LABOR BEFORE 37 COMPLETED WEEKS OF                     

 

 2016            JJ OVIEDO DO            Ot            Z3A.35        
    35 WEEKS GESTATION OF PREGNANCY                     

 

 2016            CLAUDY MCCOLLUM MD            Ot            F17.210    
        NICOTINE DEPENDENCE, CIGARETTES, UNCOMPL                     

 

 2016            CLAUDY MCCOLLUM MD            Ot            O10.013    
        PRE-EXISTING ESSENTIAL HTN COMP PREGNANC                     

 

 2016            CLAUDY MCCOLLUM MD            Ot            O41.03X0   
         OLIGOHYDRAMNIOS, THIRD TRIMESTER, NOT AP                     

 

 2016            CLAUDY MCCOLLUM MD            Ot            O70.0      
      FIRST DEGREE PERINEAL LACERATION DURING                      

 

 2016            CLAUDY MCCOLLUM MD            Ot            O72.1      
      OTHER IMMEDIATE POSTPARTUM HEMORRHAGE                     

 

 2016            CLAUDY MCCOLLUM MD, Ot            O76        
    ABNLT IN FETAL HEART RATE AND RHYTHM COM                     

 

 2016            CLAUDY MCCOLLUM MD, Ot            O99.333    
        SMOKING (TOBACCO) COMPLICATING PREGNANCY                     

 

 2016            CLAUDY MCCOLLUM MD            Ot            Z37.0      
      SINGLE LIVE BIRTH                     

 

 2016            CLAUDY MCCOLLUM MD, Ot            Z3A.37     
       37 WEEKS GESTATION OF PREGNANCY                     

 

 2017            CLAUDY MCCOLLUM MD, Ot            O13.3      
      GESTATIONAL HTN W/O SIGNIFICANT PROTEINU                     

 

 2017            CLAUDY MCCOLLUM MD            Ot            O28.0      
      ABNORMAL HEMATOLOG FINDING ON                       

 

 2017            CLAUDY MCCOLLUM MD, Ot            Z3A.34     
       34 WEEKS GESTATION OF PREGNANCY                     

 

 2017            CLAUDY MCCOLLUM MD            Ot            O28.0      
      ABNORMAL HEMATOLOG FINDING ON                       

 

 2017            CLAUDY MCCOLLUM MD, Ot            Z3A.34     
       34 WEEKS GESTATION OF PREGNANCY                     

 

 10/30/2017            CLAUDY MCCOLLUM MD, Ot            Z33.1      
      PREGNANT STATE, INCIDENTAL                     

 

 10/30/2017            CLAUDY MCCOLLUM MD            Ot            Z3A.08     
       8 WEEKS GESTATION OF PREGNANCY                     

 

 10/30/2017            CLAUDY MCCOLLUM MD            Ot            Z34.00     
       ENCNTR FOR SUPRVSN OF NORMAL FIRST PREGN                     

 

 10/30/2017            CLAUDY MCCOLLUM MD            Ot            Z34.82     
       ENCOUNTER FOR SUPRVSN OF NORMAL PREGNANC                     

 

 10/30/2017            CLAUDY MCCOLLUM MD            Ot            Z36        
    ENCOUNTER FOR  SCREENING OF MOT                     

 

 10/30/2017            CLAUDY MCCOLLUM MD, Ot            Z3A.22     
       22 WEEKS GESTATION OF PREGNANCY                     

 

 10/30/2017            CLAUDY MCCOLLUM MD            Ot            O46.93     
       ANTEPARTUM HEMORRHAGE, UNSPECIFIED, THIR                     

 

 10/30/2017            CLAUDY MCCOLLUM MD            Ot            Z3A.00     
       WEEKS OF GESTATION OF PREGNANCY NOT SPEC                     

 

 10/30/2017            CLAUDY MCCOLLUM MD            Ot            O28.0      
      ABNORMAL HEMATOLOG FINDING ON                       

 

 10/30/2017            CLAUDY MCCOLLUM MD            Ot            O28.0      
      ABNORMAL HEMATOLOG FINDING ON                       

 

 10/30/2017            CLAUDY MCCOLLUM MD            Ot            Z3A.34     
       34 WEEKS GESTATION OF PREGNANCY                     

 

 2017            LISBETH KEARNEY            Ot            F31.9      
      BIPOLAR DISORDER, UNSPECIFIED                     

 

 2017            LISBETH KEARNEY APRN            Ot            F41.9      
      ANXIETY DISORDER, UNSPECIFIED                     

 

 2017            LISBETH KEARNEY APRN            Ot            J40        
    BRONCHITIS, NOT SPECIFIED AS ACUTE OR CH                     

 

 2017            LISBETH KEARNEY APRN            Ot            R05        
    COUGH                     

 

 2017            LISBETH KEARNEY            Ot            Z80.0      
      FAMILY HISTORY OF MALIGNANT NEOPLASM OF                      

 

 2017            LISBETH KEARNEY            Ot            Z87.891    
        PERSONAL HISTORY OF NICOTINE DEPENDENCE                     

 

 2017            LISBETH KEARNEY APRN            Ot            F31.9      
      BIPOLAR DISORDER, UNSPECIFIED                     

 

 2017            LISBETH KEARNEY APRN            Ot            F41.9      
      ANXIETY DISORDER, UNSPECIFIED                     

 

 2017            LISBETH KEARNEY APRKATTY            Ot            J40        
    BRONCHITIS, NOT SPECIFIED AS ACUTE OR CH                     

 

 2017            LISBETH KEARNEY APRN            Ot            R05        
    COUGH                     

 

 2017            LISBETH KEARNEY APRKATTY            Ot            Z80.0      
      FAMILY HISTORY OF MALIGNANT NEOPLASM OF                      

 

 2017            LISBETH KEARNEY APRKATTY            Ot            Z87.891    
        PERSONAL HISTORY OF NICOTINE DEPENDENCE                     

 

 2018            ERICA MOHAN            Ot            F17.210         
   NICOTINE DEPENDENCE, CIGARETTES, UNCOMPL                     

 

 2018            ERICA MOHAN            Ot            F31.9            
BIPOLAR DISORDER, UNSPECIFIED                     

 

 2018            ERICA MOHAN            Ot            F41.9            
ANXIETY DISORDER, UNSPECIFIED                     

 

 2018            ERICA MOHAN            Ot            J06.9            
ACUTE UPPER RESPIRATORY INFECTION, UNSPE                     

 

 2018            ERICA MOHAN            Ot            N39.0            
URINARY TRACT INFECTION, SITE NOT SPECIF                     

 

 2018            ERICA MOHAN            Ot            R05            
COUGH                     

 

 2018            ERICA MOHAN            Ot            Z80.0            
FAMILY HISTORY OF MALIGNANT NEOPLASM OF                      



                                                                               
                                                                               
                                                                               
                                                                               
                                                                      



Procedures

      





 Code            Description            Performed By            Performed On   
     

 

             26019                                  URINE PREGNANCY TEST (IN-
HOUSE)                                   2013        

 

             68246                                  UA W/ CULTURE IF INDICATED 
                                  2013        

 

             [id number]                                  HCG QUALITATIVE W/ 
REFLEX                                   2013        

 

             35434                                  CULTURE UROGENITAL         
                          10/02/2013        

 

             36223                                  GC/CHLAM PROBE (STATE)     
                              10/02/2013        

 

             48625                                  PAP SMEAR                  
                 10/02/2013        

 

                                               PAP SMEAR OBTAIN SMEAR     
                              10/02/2013        

 

             81932                                  TRICHOMONAS (IN-HOUSE)     
                              10/02/2013        

 

             [id number]                                  REPAIR PERINEUM SKIN, 
EXTERNAL APPROACH                                   2016        

 

             [id number]                                  DELIVERY OF PRODUCTS OF 
CONCEPTION, EXTE                                   2016        



                                    



Results

      





 Test            Result            Range        









 CBC With Differential/Platelet - 10/04/16 10:00         









 WBC            10.0 x10E3/uL            3.4-10.8        

 

 RBC            4.26 x10E6/uL            3.77-5.28        

 

 Hemoglobin            13.2 g/dL            11.1-15.9        

 

 Hematocrit            38.1 %            34.0-46.6        

 

 MCV            89 fL            79-97        

 

 MCH            31.0 pg            26.6-33.0        

 

 MCHC            34.6 g/dL            31.5-35.7        

 

 RDW            13.3 %            12.3-15.4        

 

 Platelets            175 x10E3/uL            150-379        

 

 Neutrophils            72 %                     

 

 Lymphs            18 %                     

 

 Monocytes            8 %                     

 

 Eos            2 %                     

 

 Basos            0 %                     

 

 Neutrophils (Absolute)            7.2 x10E3/uL            1.4-7.0        

 

 Lymphs (Absolute)            1.8 x10E3/uL            0.7-3.1        

 

 Monocytes(Absolute)            0.8 x10E3/uL            0.1-0.9        

 

 Eos (Absolute)            0.2 x10E3/uL            0.0-0.4        

 

 Baso (Absolute)            0.0 x10E3/uL            0.0-0.2        

 

 Immature Granulocytes            0 %                     

 

 Immature Grans (Abs)            0.0 x10E3/uL            0.0-0.1        









 Comp. Metabolic Panel (14) - 10/04/16 10:00         









 Glucose, Serum            100 mg/dL            65-99        

 

 BUN            6 mg/dL            6-20        

 

 Creatinine, Serum            0.55 mg/dL            0.57-1.00        

 

 eGFR If NonAfricn Am            131 mL/min/1.73                >59        

 

 eGFR If Africn Am            151 mL/min/1.73                >59        

 

 BUN/Creatinine Ratio            11             8-20        

 

 Sodium, Serum            140 mmol/L            134-144        

 

 Potassium, Serum            4.0 mmol/L            3.5-5.2        

 

 Chloride, Serum            105 mmol/L                    

 

 Carbon Dioxide, Total            19 mmol/L            18-29        

 

 Calcium, Serum            9.2 mg/dL            8.7-10.2        

 

 Protein, Total, Serum            6.1 g/dL            6.0-8.5        

 

 Albumin, Serum            3.6 g/dL            3.5-5.5        

 

 Globulin, Total            2.5 g/dL            1.5-4.5        

 

 A/G Ratio            1.4             1.1-2.5        

 

 Bilirubin, Total            0.2 mg/dL            0.0-1.2        

 

 Alkaline Phosphatase, S            99 IU/L                    

 

 AST (SGOT)            21 IU/L            0-40        

 

 ALT (SGPT)            28 IU/L            0-32        









 Prot+CreatU (Random) - 10/04/16 10:00         









 Creatinine, Urine            58.8 mg/dL            Not Estab.        

 

 Protein,Total,Urine            10.0 mg/dL            Not Estab.        

 

 Protein/Creat Ratio            170 mg/g creat            0-200        









 Uric Acid, Serum - 10/04/16 10:00         









 Uric Acid, Serum            5.0 mg/dL            2.5-7.1        









 LDH - 10/04/16 10:00         









 LDH            152 IU/L            119-226        









 CBC With Differential/Platelet - 10/07/16 15:38         









 WBC            9.9 x10E3/uL            3.4-10.8        

 

 RBC            4.09 x10E6/uL            3.77-5.28        

 

 Hemoglobin            12.8 g/dL            11.1-15.9        

 

 Hematocrit            37.7 %            34.0-46.6        

 

 MCV            92 fL            79-97        

 

 MCH            31.3 pg            26.6-33.0        

 

 MCHC            34.0 g/dL            31.5-35.7        

 

 RDW            13.4 %            12.3-15.4        

 

 Platelets            185 x10E3/uL            150-379        

 

 Neutrophils            71 %                     

 

 Lymphs            21 %                     

 

 Monocytes            6 %                     

 

 Eos            2 %                     

 

 Basos            0 %                     

 

 Neutrophils (Absolute)            7.0 x10E3/uL            1.4-7.0        

 

 Lymphs (Absolute)            2.1 x10E3/uL            0.7-3.1        

 

 Monocytes(Absolute)            0.6 x10E3/uL            0.1-0.9        

 

 Eos (Absolute)            0.2 x10E3/uL            0.0-0.4        

 

 Baso (Absolute)            0.0 x10E3/uL            0.0-0.2        

 

 Immature Granulocytes            0 %                     

 

 Immature Grans (Abs)            0.0 x10E3/uL            0.0-0.1        









 Gest. Diabetes 1-Hr Screen - 10/07/16 15:38         









 Gestational Diabetes Screen            119 mg/dL                    









 CBC With Differential/Platelet - 10/12/16 10:33         









 WBC            11.9 x10E3/uL            3.4-10.8        

 

 RBC            4.03 x10E6/uL            3.77-5.28        

 

 Hemoglobin            12.5 g/dL            11.1-15.9        

 

 Hematocrit            37.1 %            34.0-46.6        

 

 MCV            92 fL            79-97        

 

 MCH            31.0 pg            26.6-33.0        

 

 MCHC            33.7 g/dL            31.5-35.7        

 

 RDW            13.4 %            12.3-15.4        

 

 Platelets            177 x10E3/uL            150-379        

 

 Neutrophils            75 %                     

 

 Lymphs            17 %                     

 

 Monocytes            6 %                     

 

 Eos            2 %                     

 

 Basos            0 %                     

 

 Neutrophils (Absolute)            8.9 x10E3/uL            1.4-7.0        

 

 Lymphs (Absolute)            2.0 x10E3/uL            0.7-3.1        

 

 Monocytes(Absolute)            0.8 x10E3/uL            0.1-0.9        

 

 Eos (Absolute)            0.2 x10E3/uL            0.0-0.4        

 

 Baso (Absolute)            0.0 x10E3/uL            0.0-0.2        

 

 Immature Granulocytes            0 %                     

 

 Immature Grans (Abs)            0.0 x10E3/uL            0.0-0.1        









 Comp. Metabolic Panel (14) - 10/12/16 10:33         









 Glucose, Serum            83 mg/dL            65-99        

 

 BUN            4 mg/dL            6-20        

 

 Creatinine, Serum            0.60 mg/dL            0.57-1.00        

 

 eGFR If NonAfricn Am            126 mL/min/1.73                >59        

 

 eGFR If Africn Am            145 mL/min/1.73                >59        

 

 BUN/Creatinine Ratio            7             8-20        

 

 Sodium, Serum            142 mmol/L            134-144        

 

 Potassium, Serum            4.1 mmol/L            3.5-5.2        

 

 Chloride, Serum            105 mmol/L                    

 

 Carbon Dioxide, Total            20 mmol/L            18-29        

 

 Calcium, Serum            9.1 mg/dL            8.7-10.2        

 

 Protein, Total, Serum            5.7 g/dL            6.0-8.5        

 

 Albumin, Serum            3.4 g/dL            3.5-5.5        

 

 Globulin, Total            2.3 g/dL            1.5-4.5        

 

 A/G Ratio            1.5             1.1-2.5        

 

 Bilirubin, Total            0.2 mg/dL            0.0-1.2        

 

 Alkaline Phosphatase, S            101 IU/L                    

 

 AST (SGOT)            14 IU/L            0-40        

 

 ALT (SGPT)            22 IU/L            0-32        









 Prot+CreatU (Random) - 10/12/16 10:33         









 Creatinine, Urine            114.6 mg/dL            Not Estab.        

 

 Protein,Total,Urine            23.6 mg/dL            Not Estab.        

 

 Protein/Creat Ratio            206 mg/g creat            0-200        









 Uric Acid, Serum - 10/12/16 10:33         









 Uric Acid, Serum            5.2 mg/dL            2.5-7.1        









 LDH - 10/12/16 10:33         









 LDH            155 IU/L            119-226        









 Urine Culture, Routine - 10/12/16 10:33         









 Urine Culture, Routine            Note                      









 Urine Culture, Routine - 10/18/16 15:19         









 Urine Culture, Routine            Note                      









 Genital Culture, Routine - 10/18/16 15:19         









 Genital Culture, Routine            Note                      









 Complete blood count (CBC) with automated white blood cell (WBC) differential 
- 16 11:14         









 Blood leukocytes automated count (number/volume)            14.3 10*3/uL      
      4.3-11.0        

 

 Blood erythrocytes automated count (number/volume)            4.22 10*6/uL    
        4.35-5.85        

 

 Venous blood hemoglobin measurement (mass/volume)            13.4 g/dL        
    11.5-16.0        

 

 Blood hematocrit (volume fraction)            38 %            35-52        

 

 Automated erythrocyte mean corpuscular volume            90 [foz_us]          
  80-99        

 

 Automated erythrocyte mean corpuscular hemoglobin (mass per erythrocyte)      
      32 pg            25-34        

 

 Automated erythrocyte mean corpuscular hemoglobin concentration measurement (
mass/volume)            35 g/dL            32-36        

 

 Automated erythrocyte distribution width ratio            12.9 %            
10.0-14.5        

 

 Automated blood platelet count (count/volume)            188 10*3/uL          
  130-400        

 

 Automated blood platelet mean volume measurement            10.4 [foz_us]     
       7.4-10.4        

 

 Automated blood neutrophils/100 leukocytes            72 %            42-75   
     

 

 Automated blood lymphocytes/100 leukocytes            20 %            12-44   
     

 

 Blood monocytes/100 leukocytes            7 %            0-12        

 

 Automated blood eosinophils/100 leukocytes            1 %            0-10     
   

 

 Automated blood basophils/100 leukocytes            0 %            0-10        

 

 Blood neutrophils automated count (number/volume)            10.3 10*3        
    1.8-7.8        

 

 Blood lymphocytes automated count (number/volume)            2.8 10*3         
   1.0-4.0        

 

 Blood monocytes automated count (number/volume)            1.0 10*3            
0.0-1.0        

 

 Automated eosinophil count            0.2 10*3/uL            0.0-0.3        

 

 Automated blood basophil count (count/volume)            0.0 10*3/uL          
  0.0-0.1        









 Blood manual differential performed detection - 16 11:14         









 Blood monocytes/100 leukocytes            3 %            NRG        

 

 Manual blood segmented neutrophils/100 leukocytes            71 %            
NRG        

 

 Manual blood lymphocytes/100 leukocytes            23 %            NRG        

 

 Manual eosinophils/100 leukocytes in nose            2 %            NRG        

 

 Manual blood basophils/100 leukocytes            1 %            NRG        

 

 Blood erythrocyte morphology finding identification            NORMAL         
    NRG        









 Serum or plasma choriogonadotropin measurement (units/volume) - 16 11:14
         









 Serum or plasma choriogonadotropin measurement (units/volume)            16607 
m[iU]/mL            <5        









 Complete urinalysis with reflex to culture - 16 12:13         









 Urine color determination            LYUBOV             NRG        

 

 Urine clarity determination            CLEAR             NRG        

 

 Urine pH measurement by test strip            7             5-9        

 

 Specific gravity of urine by test strip            1.015             1.016-
1.022        

 

 Urine protein assay by test strip, semi-quantitative            1+             
NEGATIVE        

 

 Urine glucose detection by automated test strip            NEGATIVE           
  NEGATIVE        

 

 Erythrocytes detection in urine sediment by light microscopy            
NEGATIVE             NEGATIVE        

 

 Urine ketones detection by automated test strip            2+             
NEGATIVE        

 

 Urine nitrite detection by test strip            NEGATIVE             NEGATIVE
        

 

 Urine total bilirubin detection by test strip            NEGATIVE             
NEGATIVE        

 

 Urine urobilinogen measurement by automated test strip (mass/volume)          
  1 mg/dL            NORMAL        

 

 Urine leukocyte esterase detection by dipstick            3+             
NEGATIVE        

 

 Automated urine sediment erythrocyte count by microscopy (number/high power 
field)            NONE             NRG        

 

 Automated urine sediment leukocyte count by microscopy (number/high power field
)             [HPF]            NRG        

 

 Bacteria detection in urine sediment by light microscopy            MODERATE  
           NRG        

 

 Squamous epithelial cells detection in urine sediment by light microscopy     
       >50             NRG        

 

 Crystals detection in urine sediment by light microscopy            NONE      
       NRG        

 

 Casts detection in urine sediment by light microscopy            NONE         
    NRG        

 

 Mucus detection in urine sediment by light microscopy            NEGATIVE     
        NRG        

 

 Complete urinalysis with reflex to culture            YES             NRG     
   









 Bacterial urine culture - 16 12:13         









 URINE CULTURE RESULTS            <10,000/ML             NRG        









 CBC With Differential/Platelet - 16 13:59         









 WBC            11.3 x10E3/uL            3.4-10.8        

 

 RBC            4.24 x10E6/uL            3.77-5.28        

 

 Hemoglobin            13.2 g/dL            11.1-15.9        

 

 Hematocrit            39.5 %            34.0-46.6        

 

 MCV            93 fL            79-97        

 

 MCH            31.1 pg            26.6-33.0        

 

 MCHC            33.4 g/dL            31.5-35.7        

 

 RDW            13.1 %            12.3-15.4        

 

 Platelets            215 x10E3/uL            150-379        

 

 Neutrophils            68 %                     

 

 Lymphs            23 %                     

 

 Monocytes            7 %                     

 

 Eos            2 %                     

 

 Basos            0 %                     

 

 Neutrophils (Absolute)            7.6 x10E3/uL            1.4-7.0        

 

 Lymphs (Absolute)            2.6 x10E3/uL            0.7-3.1        

 

 Monocytes(Absolute)            0.8 x10E3/uL            0.1-0.9        

 

 Eos (Absolute)            0.3 x10E3/uL            0.0-0.4        

 

 Baso (Absolute)            0.0 x10E3/uL            0.0-0.2        

 

 Immature Granulocytes            0 %                     

 

 Immature Grans (Abs)            0.0 x10E3/uL            0.0-0.1        









 Comp. Metabolic Panel (14) - 16 13:59         









 Glucose, Serum            85 mg/dL            65-99        

 

 BUN            7 mg/dL            6-20        

 

 Creatinine, Serum            0.58 mg/dL            0.57-1.00        

 

 eGFR If NonAfricn Am            128 mL/min/1.73                >59        

 

 eGFR If Africn Am            147 mL/min/1.73                >59        

 

 BUN/Creatinine Ratio            12             8-20        

 

 Sodium, Serum            140 mmol/L            136-144        

 

 Potassium, Serum            4.3 mmol/L            3.5-5.2        

 

 Chloride, Serum            103 mmol/L                    

 

 Carbon Dioxide, Total            22 mmol/L            18-29        

 

 Calcium, Serum            9.6 mg/dL            8.7-10.2        

 

 Protein, Total, Serum            6.0 g/dL            6.0-8.5        

 

 Albumin, Serum            2.9 g/dL            3.5-5.5        

 

 Globulin, Total            3.1 g/dL            1.5-4.5        

 

 A/G Ratio            0.9             1.1-2.5        

 

 Bilirubin, Total            0.2 mg/dL            0.0-1.2        

 

 Alkaline Phosphatase, S            140 IU/L                    

 

 AST (SGOT)            15 IU/L            0-40        

 

 ALT (SGPT)            16 IU/L            0-32        









 Prot+CreatU (Random) - 16 13:59         









 Creatinine, Urine            170.5 mg/dL            Not Estab.        

 

 Protein,Total,Urine            33.8 mg/dL            Not Estab.        

 

 Protein/Creat Ratio            198 mg/g creat            0-200        









 Uric Acid, Serum - 16 13:59         









 Uric Acid, Serum            5.0 mg/dL            2.5-7.1        









 LDH - 16 13:59         









 LDH            160 IU/L            119-226        









 Prot+CreatU (Random) - 16 16:15         









 Creatinine, Urine            183.4 mg/dL            Not Estab.        

 

 Protein,Total,Urine            32.9 mg/dL            Not Estab.        

 

 Protein/Creat Ratio            179 mg/g creat            0-200        









 CBC With Differential/Platelet - 16 16:15         









 WBC            12.3 x10E3/uL            3.4-10.8        

 

 RBC            4.16 x10E6/uL            3.77-5.28        

 

 Hemoglobin            13.0 g/dL            11.1-15.9        

 

 Hematocrit            39.0 %            34.0-46.6        

 

 MCV            94 fL            79-97        

 

 MCH            31.3 pg            26.6-33.0        

 

 MCHC            33.3 g/dL            31.5-35.7        

 

 RDW            13.3 %            12.3-15.4        

 

 Platelets            199 x10E3/uL            150-379        

 

 Neutrophils            66 %                     

 

 Lymphs            24 %                     

 

 Monocytes            8 %                     

 

 Eos            2 %                     

 

 Basos            0 %                     

 

 Neutrophils (Absolute)            8.1 x10E3/uL            1.4-7.0        

 

 Lymphs (Absolute)            2.9 x10E3/uL            0.7-3.1        

 

 Monocytes(Absolute)            1.0 x10E3/uL            0.1-0.9        

 

 Eos (Absolute)            0.2 x10E3/uL            0.0-0.4        

 

 Baso (Absolute)            0.0 x10E3/uL            0.0-0.2        

 

 Immature Granulocytes            0 %                     

 

 Immature Grans (Abs)            0.0 x10E3/uL            0.0-0.1        









 Comp. Metabolic Panel (14) - 16 16:15         









 Glucose, Serum            97 mg/dL            65-99        

 

 BUN            8 mg/dL            6-20        

 

 Creatinine, Serum            0.58 mg/dL            0.57-1.00        

 

 eGFR If NonAfricn Am            128 mL/min/1.73                >59        

 

 eGFR If Africn Am            147 mL/min/1.73                >59        

 

 BUN/Creatinine Ratio            14             8-20        

 

 Sodium, Serum            141 mmol/L            136-144        

 

 Potassium, Serum            4.6 mmol/L            3.5-5.2        

 

 Chloride, Serum            104 mmol/L                    

 

 Carbon Dioxide, Total            20 mmol/L            18-29        

 

 Calcium, Serum            9.3 mg/dL            8.7-10.2        

 

 Protein, Total, Serum            5.9 g/dL            6.0-8.5        

 

 Albumin, Serum            3.3 g/dL            3.5-5.5        

 

 Globulin, Total            2.6 g/dL            1.5-4.5        

 

 A/G Ratio            1.3             1.1-2.5        

 

 Bilirubin, Total            0.2 mg/dL            0.0-1.2        

 

 Alkaline Phosphatase, S            167 IU/L                    

 

 AST (SGOT)            18 IU/L            0-40        

 

 ALT (SGPT)            16 IU/L            0-32        









 Uric Acid, Serum - 16 16:15         









 Uric Acid, Serum            5.3 mg/dL            2.5-7.1        









 LDH - 16 16:15         









 LDH            159 IU/L            119-226        









 CBC With Differential/Platelet - 16 15:27         









 WBC            12.8 x10E3/uL            3.4-10.8        

 

 RBC            4.57 x10E6/uL            3.77-5.28        

 

 Hemoglobin            14.2 g/dL            11.1-15.9        

 

 Hematocrit            42.5 %            34.0-46.6        

 

 MCV            93 fL            79-97        

 

 MCH            31.1 pg            26.6-33.0        

 

 MCHC            33.4 g/dL            31.5-35.7        

 

 RDW            12.9 %            12.3-15.4        

 

 Platelets            215 x10E3/uL            150-379        

 

 Neutrophils            69 %                     

 

 Lymphs            22 %                     

 

 Monocytes            7 %                     

 

 Eos            2 %                     

 

 Basos            0 %                     

 

 Neutrophils (Absolute)            8.8 x10E3/uL            1.4-7.0        

 

 Lymphs (Absolute)            2.9 x10E3/uL            0.7-3.1        

 

 Monocytes(Absolute)            0.9 x10E3/uL            0.1-0.9        

 

 Eos (Absolute)            0.2 x10E3/uL            0.0-0.4        

 

 Baso (Absolute)            0.0 x10E3/uL            0.0-0.2        

 

 Immature Granulocytes            0 %                     

 

 Immature Grans (Abs)            0.0 x10E3/uL            0.0-0.1        









 Comp. Metabolic Panel (14) - 16 15:27         









 Glucose, Serum            117 mg/dL            65-99        

 

 BUN            8 mg/dL            6-20        

 

 Creatinine, Serum            0.55 mg/dL            0.57-1.00        

 

 eGFR If NonAfricn Am            130 mL/min/1.73                >59        

 

 eGFR If Africn Am            150 mL/min/1.73                >59        

 

 BUN/Creatinine Ratio            15             8-20        

 

 Sodium, Serum            139 mmol/L            136-144        

 

 Potassium, Serum            4.2 mmol/L            3.5-5.2        

 

 Chloride, Serum            100 mmol/L                    

 

 Carbon Dioxide, Total            23 mmol/L            18-29        

 

 Calcium, Serum            9.5 mg/dL            8.7-10.2        

 

 Protein, Total, Serum            6.5 g/dL            6.0-8.5        

 

 Albumin, Serum            3.8 g/dL            3.5-5.5        

 

 Globulin, Total            2.7 g/dL            1.5-4.5        

 

 A/G Ratio            1.4             1.1-2.5        

 

 Bilirubin, Total            0.3 mg/dL            0.0-1.2        

 

 Alkaline Phosphatase, S            207 IU/L                    

 

 AST (SGOT)            15 IU/L            0-40        

 

 ALT (SGPT)            15 IU/L            0-32        









 Prot+CreatU (Random) - 16 15:27         









 Creatinine, Urine            124.7 mg/dL            Not Estab.        

 

 Protein,Total,Urine            27.8 mg/dL            Not Estab.        

 

 Protein/Creat Ratio            223 mg/g creat            0-200        









 Uric Acid, Serum - 16 15:27         









 Uric Acid, Serum            5.8 mg/dL            2.5-7.1        









 LDH - 16 15:27         









 LDH            184 IU/L            119-226        









 Complete urinalysis with reflex to culture - 16 21:30         









 Urine color determination            YELLOW             NRG        

 

 Urine clarity determination            SLIGHTLY CLOUDY             NRG        

 

 Urine pH measurement by test strip            6             5-9        

 

 Specific gravity of urine by test strip            1.025             1.016-
1.022        

 

 Urine protein assay by test strip, semi-quantitative            2+             
NEGATIVE        

 

 Urine glucose detection by automated test strip            NEGATIVE           
  NEGATIVE        

 

 Erythrocytes detection in urine sediment by light microscopy            1+    
         NEGATIVE        

 

 Urine ketones detection by automated test strip            1+             
NEGATIVE        

 

 Urine nitrite detection by test strip            NEGATIVE             NEGATIVE
        

 

 Urine total bilirubin detection by test strip            NEGATIVE             
NEGATIVE        

 

 Urine urobilinogen measurement by automated test strip (mass/volume)          
  1 mg/dL            NORMAL        

 

 Urine leukocyte esterase detection by dipstick            3+             
NEGATIVE        

 

 Automated urine sediment erythrocyte count by microscopy (number/high power 
field)             [HPF]            NRG        

 

 Automated urine sediment leukocyte count by microscopy (number/high power field
)            > [HPF]            NRG        

 

 Bacteria detection in urine sediment by light microscopy            MODERATE  
           NRG        

 

 Squamous epithelial cells detection in urine sediment by light microscopy     
       5-10             NRG        

 

 Crystals detection in urine sediment by light microscopy            NONE      
       NRG        

 

 Casts detection in urine sediment by light microscopy            NONE         
    NRG        

 

 Mucus detection in urine sediment by light microscopy            NEGATIVE     
        NRG        

 

 Complete urinalysis with reflex to culture            YES             NRG     
   

 

 Urine Trichomonas species detection by light microscopy            FEW        
     NRG        









 Bacterial urine culture - 16 21:30         









 URINE CULTURE RESULTS            <10,000/ML             NRG        









 Strep Gp B Culture - 16 15:11         









 Strep Gp B Culture            Negative             Negative        









 CBC With Differential/Platelet - 16 15:11         









 WBC            13.2 x10E3/uL            3.4-10.8        

 

 RBC            4.47 x10E6/uL            3.77-5.28        

 

 Hemoglobin            13.9 g/dL            11.1-15.9        

 

 Hematocrit            41.1 %            34.0-46.6        

 

 MCV            92 fL            79-97        

 

 MCH            31.1 pg            26.6-33.0        

 

 MCHC            33.8 g/dL            31.5-35.7        

 

 RDW            12.8 %            12.3-15.4        

 

 Platelets            214 x10E3/uL            150-379        

 

 Neutrophils            67 %                     

 

 Lymphs            24 %                     

 

 Monocytes            8 %                     

 

 Eos            1 %                     

 

 Basos            0 %                     

 

 Neutrophils (Absolute)            8.7 x10E3/uL            1.4-7.0        

 

 Lymphs (Absolute)            3.1 x10E3/uL            0.7-3.1        

 

 Monocytes(Absolute)            1.1 x10E3/uL            0.1-0.9        

 

 Eos (Absolute)            0.2 x10E3/uL            0.0-0.4        

 

 Baso (Absolute)            0.0 x10E3/uL            0.0-0.2        

 

 Immature Granulocytes            0 %                     

 

 Immature Grans (Abs)            0.0 x10E3/uL            0.0-0.1        









 Comp. Metabolic Panel (14) - 16 15:11         









 Glucose, Serum            85 mg/dL            65-99        

 

 BUN            10 mg/dL            6-20        

 

 Creatinine, Serum            0.56 mg/dL            0.57-1.00        

 

 eGFR If NonAfricn Am            129 mL/min/1.73                >59        

 

 eGFR If Africn Am            149 mL/min/1.73                >59        

 

 BUN/Creatinine Ratio            18             8-20        

 

 Sodium, Serum            140 mmol/L            136-144        

 

 Potassium, Serum            4.5 mmol/L            3.5-5.2        

 

 Chloride, Serum            104 mmol/L                    

 

 Carbon Dioxide, Total            21 mmol/L            18-29        

 

 Calcium, Serum            9.3 mg/dL            8.7-10.2        

 

 Protein, Total, Serum            5.9 g/dL            6.0-8.5        

 

 Albumin, Serum            3.4 g/dL            3.5-5.5        

 

 Globulin, Total            2.5 g/dL            1.5-4.5        

 

 A/G Ratio            1.4             1.1-2.5        

 

 Bilirubin, Total            0.3 mg/dL            0.0-1.2        

 

 Alkaline Phosphatase, S            226 IU/L                    

 

 AST (SGOT)            20 IU/L            0-40        

 

 ALT (SGPT)            16 IU/L            0-32        









 Prot+CreatU (Random) - 16 15:11         









 Creatinine, Urine            222.7 mg/dL            Not Estab.        

 

 Protein,Total,Urine            32.7 mg/dL            Not Estab.        

 

 Protein/Creat Ratio            147 mg/g creat            0-200        









 Uric Acid, Serum - 16 15:11         









 Uric Acid, Serum            6.4 mg/dL            2.5-7.1        









 LDH - 16 15:11         









 LDH            184 IU/L            119-226        









 Streptococcus pyogenes antigen detection - 16 08:52         









 Streptococcus pyogenes antigen detection            NEGATIVE             
NEGATIVE        









 Bacterial throat culture - 16 08:52         









 Bacterial throat culture            NBS             NRG        









 Complete blood count (CBC) with automated white blood cell (WBC) differential 
- 16 09:00         









 Blood leukocytes automated count (number/volume)            11.3 10*3/uL      
      4.3-11.0        

 

 Blood erythrocytes automated count (number/volume)            4.21 10*6/uL    
        4.35-5.85        

 

 Venous blood hemoglobin measurement (mass/volume)            13.1 g/dL        
    11.5-16.0        

 

 Blood hematocrit (volume fraction)            38 %            35-52        

 

 Automated erythrocyte mean corpuscular volume            91 [foz_us]          
  80-99        

 

 Automated erythrocyte mean corpuscular hemoglobin (mass per erythrocyte)      
      31 pg            25-34        

 

 Automated erythrocyte mean corpuscular hemoglobin concentration measurement (
mass/volume)            34 g/dL            32-36        

 

 Automated erythrocyte distribution width ratio            12.6 %            
10.0-14.5        

 

 Automated blood platelet count (count/volume)            147 10*3/uL          
  130-400        

 

 Automated blood platelet mean volume measurement            11.2 [foz_us]     
       7.4-10.4        

 

 Automated blood neutrophils/100 leukocytes            64 %            42-75   
     

 

 Automated blood lymphocytes/100 leukocytes            25 %            12-44   
     

 

 Blood monocytes/100 leukocytes            9 %            0-12        

 

 Automated blood eosinophils/100 leukocytes            2 %            0-10     
   

 

 Automated blood basophils/100 leukocytes            0 %            0-10        

 

 Blood neutrophils automated count (number/volume)            7.3 10*3         
   1.8-7.8        

 

 Blood lymphocytes automated count (number/volume)            2.9 10*3         
   1.0-4.0        

 

 Blood monocytes automated count (number/volume)            1.0 10*3            
0.0-1.0        

 

 Automated eosinophil count            0.2 10*3/uL            0.0-0.3        

 

 Automated blood basophil count (count/volume)            0.0 10*3/uL          
  0.0-0.1        









 Serum heterophile antibody titer - 16 09:00         









 Serum heterophile antibody titer            NEGATIVE             NEGATIVE     
   









 CBC With Differential/Platelet - 16 15:53         









 WBC            15.4 x10E3/uL            3.4-10.8        

 

 RBC            4.19 x10E6/uL            3.77-5.28        

 

 Hemoglobin            12.7 g/dL            11.1-15.9        

 

 Hematocrit            38.0 %            34.0-46.6        

 

 MCV            91 fL            79-97        

 

 MCH            30.3 pg            26.6-33.0        

 

 MCHC            33.4 g/dL            31.5-35.7        

 

 RDW            12.3 %            12.3-15.4        

 

 Platelets            178 x10E3/uL            150-379        

 

 Neutrophils            61 %                     

 

 Lymphs            31 %                     

 

 Monocytes            7 %                     

 

 Eos            1 %                     

 

 Basos            0 %                     

 

 Neutrophils (Absolute)            9.3 x10E3/uL            1.4-7.0        

 

 Lymphs (Absolute)            4.7 x10E3/uL            0.7-3.1        

 

 Monocytes(Absolute)            1.1 x10E3/uL            0.1-0.9        

 

 Eos (Absolute)            0.1 x10E3/uL            0.0-0.4        

 

 Baso (Absolute)            0.0 x10E3/uL            0.0-0.2        

 

 Immature Granulocytes            0 %                     

 

 Immature Grans (Abs)            0.0 x10E3/uL            0.0-0.1        









 Comp. Metabolic Panel (14) - 16 15:53         









 Glucose, Serum            98 mg/dL            65-99        

 

 BUN            9 mg/dL            6-20        

 

 Creatinine, Serum            0.60 mg/dL            0.57-1.00        

 

 eGFR If NonAfricn Am            126 mL/min/1.73                >59        

 

 eGFR If Africn Am            145 mL/min/1.73                >59        

 

 BUN/Creatinine Ratio            15             8-20        

 

 Sodium, Serum            142 mmol/L            134-144        

 

 Potassium, Serum            3.8 mmol/L            3.5-5.2        

 

 Chloride, Serum            106 mmol/L                    

 

 Carbon Dioxide, Total            19 mmol/L            18-29        

 

 Calcium, Serum            8.9 mg/dL            8.7-10.2        

 

 Protein, Total, Serum            5.9 g/dL            6.0-8.5        

 

 Albumin, Serum            3.5 g/dL            3.5-5.5        

 

 Globulin, Total            2.4 g/dL            1.5-4.5        

 

 A/G Ratio            1.5             1.1-2.5        

 

 Bilirubin, Total            <0.2 mg/dL            0.0-1.2        

 

 Alkaline Phosphatase, S            215 IU/L                    

 

 AST (SGOT)            13 IU/L            0-40        

 

 ALT (SGPT)            11 IU/L            0-32        









 Prot+CreatU (Random) - 16 15:53         









 Creatinine, Urine            218.9 mg/dL            Not Estab.        

 

 Protein,Total,Urine            47.9 mg/dL            Not Estab.        

 

 Protein/Creat Ratio            219 mg/g creat            0-200        









 Uric Acid, Serum - 16 15:53         









 Uric Acid, Serum            5.8 mg/dL            2.5-7.1        









 LDH - 16 15:53         









 LDH            170 IU/L            119-226        









 Complete urinalysis with reflex to culture - 16 01:20         









 Urine color determination            YELLOW             NRG        

 

 Urine clarity determination            SLIGHTLY CLOUDY             NRG        

 

 Urine pH measurement by test strip            6.5             5-9        

 

 Specific gravity of urine by test strip            1.020             1.016-
1.022        

 

 Urine protein assay by test strip, semi-quantitative            2+             
NEGATIVE        

 

 Urine glucose detection by automated test strip            NEGATIVE           
  NEGATIVE        

 

 Erythrocytes detection in urine sediment by light microscopy            
NEGATIVE             NEGATIVE        

 

 Urine ketones detection by automated test strip            1+             
NEGATIVE        

 

 Urine nitrite detection by test strip            NEGATIVE             NEGATIVE
        

 

 Urine total bilirubin detection by test strip            NEGATIVE             
NEGATIVE        

 

 Urine urobilinogen measurement by automated test strip (mass/volume)          
  4 mg/dL            NORMAL        

 

 Urine leukocyte esterase detection by dipstick            2+             
NEGATIVE        

 

 Automated urine sediment erythrocyte count by microscopy (number/high power 
field)            NONE             NRG        

 

 Automated urine sediment leukocyte count by microscopy (number/high power field
)             [HPF]            NRG        

 

 Bacteria detection in urine sediment by light microscopy            FEW       
      NRG        

 

 Squamous epithelial cells detection in urine sediment by light microscopy     
       10-25             NRG        

 

 Crystals detection in urine sediment by light microscopy            NONE      
       NRG        

 

 Casts detection in urine sediment by light microscopy            NONE         
    NRG        

 

 Mucus detection in urine sediment by light microscopy            LARGE        
     NRG        

 

 Complete urinalysis with reflex to culture            NO             NRG      
  









 Urine protein/creatinine mass ratio - 16 01:20         









 Urine protein measurement (mass/volume)            66 mg/dL            6-12   
     

 

 Urine creatinine measurement (mass/volume)            328 mg/dL            30-
125        

 

 Urine protein/creatinine mass ratio            0.20             NRG        









 Complete blood count (CBC) with automated white blood cell (WBC) differential 
- 16 02:20         









 Blood leukocytes automated count (number/volume)            13.0 10*3/uL      
      4.3-11.0        

 

 Blood erythrocytes automated count (number/volume)            4.13 10*6/uL    
        4.35-5.85        

 

 Venous blood hemoglobin measurement (mass/volume)            12.8 g/dL        
    11.5-16.0        

 

 Blood hematocrit (volume fraction)            37 %            35-52        

 

 Automated erythrocyte mean corpuscular volume            90 [foz_us]          
  80-99        

 

 Automated erythrocyte mean corpuscular hemoglobin (mass per erythrocyte)      
      31 pg            25-34        

 

 Automated erythrocyte mean corpuscular hemoglobin concentration measurement (
mass/volume)            34 g/dL            32-36        

 

 Automated erythrocyte distribution width ratio            12.6 %            
10.0-14.5        

 

 Automated blood platelet count (count/volume)            164 10*3/uL          
  130-400        

 

 Automated blood platelet mean volume measurement            11.6 [foz_us]     
       7.4-10.4        

 

 Automated blood neutrophils/100 leukocytes            62 %            42-75   
     

 

 Automated blood lymphocytes/100 leukocytes            28 %            12-44   
     

 

 Blood monocytes/100 leukocytes            9 %            0-12        

 

 Automated blood eosinophils/100 leukocytes            1 %            0-10     
   

 

 Automated blood basophils/100 leukocytes            0 %            0-10        

 

 Blood neutrophils automated count (number/volume)            8.1 10*3         
   1.8-7.8        

 

 Blood lymphocytes automated count (number/volume)            3.6 10*3         
   1.0-4.0        

 

 Blood monocytes automated count (number/volume)            1.1 10*3            
0.0-1.0        

 

 Automated eosinophil count            0.2 10*3/uL            0.0-0.3        

 

 Automated blood basophil count (count/volume)            0.0 10*3/uL          
  0.0-0.1        









 Comprehensive metabolic panel - 16 02:20         









 Serum or plasma sodium measurement (moles/volume)            138 mmol/L       
     135-145        

 

 Serum or plasma potassium measurement (moles/volume)            4.0 mmol/L    
        3.6-5.0        

 

 Serum or plasma chloride measurement (moles/volume)            109 mmol/L     
               

 

 Carbon dioxide            21 mmol/L            21-32        

 

 Serum or plasma anion gap determination (moles/volume)            8 mmol/L    
        5-14        

 

 Serum or plasma urea nitrogen measurement (mass/volume)            10 mg/dL   
         7-18        

 

 Serum or plasma creatinine measurement (mass/volume)            0.62 mg/dL    
        0.60-1.30        

 

 Serum or plasma urea nitrogen/creatinine mass ratio            16             
NRG        

 

 Serum or plasma creatinine measurement with calculation of estimated 
glomerular filtration rate            >             NRG        

 

 Serum or plasma glucose measurement (mass/volume)            82 mg/dL         
           

 

 Serum or plasma calcium measurement (mass/volume)            8.9 mg/dL        
    8.5-10.1        

 

 Serum or plasma total bilirubin measurement (mass/volume)            0.3 mg/dL
            0.1-1.0        

 

 Serum or plasma alkaline phosphatase measurement (enzymatic activity/volume)  
          245 U/L                    

 

 Serum or plasma aspartate aminotransferase measurement (enzymatic activity/
volume)            17 U/L            5-34        

 

 Serum or plasma alanine aminotransferase measurement (enzymatic activity/volume
)            16 U/L            0-55        

 

 Serum or plasma protein measurement (mass/volume)            5.9 g/dL         
   6.4-8.2        

 

 Serum or plasma albumin measurement (mass/volume)            3.0 g/dL         
   3.2-4.5        









 Serum or plasma uric acid measurement (mass/volume) - 16 02:20         









 Serum or plasma uric acid measurement (mass/volume)            6.0 mg/dL      
      2.6-7.2        









 Lactate dehydrogenase 1 [enzymatic activity/volume] in serum or plasma -  02:20         









 Lactate dehydrogenase 1 [enzymatic activity/volume] in serum or plasma        
    231 U/L            125-220        









 [id number] - 16 02:20         









 [id number]            SPECIMEN AVAILABLE             NRG        









 Urine protein/creatinine mass ratio - 16 12:50         









 Urine protein measurement (mass/volume)            < mg/dL            6-12    
    

 

 Urine creatinine measurement (mass/volume)            27 mg/dL            30-
125        

 

 Urine protein/creatinine mass ratio            TNP             NRG        









 Complete urinalysis with reflex to culture - 16 12:50         









 Urine color determination            YELLOW             NRG        

 

 Urine clarity determination            CLEAR             NRG        

 

 Urine pH measurement by test strip            7             5-9        

 

 Specific gravity of urine by test strip            1.005             1.016-
1.022        

 

 Urine protein assay by test strip, semi-quantitative            NEGATIVE      
       NEGATIVE        

 

 Urine glucose detection by automated test strip            NEGATIVE           
  NEGATIVE        

 

 Erythrocytes detection in urine sediment by light microscopy            
NEGATIVE             NEGATIVE        

 

 Urine ketones detection by automated test strip            NEGATIVE           
  NEGATIVE        

 

 Urine nitrite detection by test strip            NEGATIVE             NEGATIVE
        

 

 Urine total bilirubin detection by test strip            NEGATIVE             
NEGATIVE        

 

 Urine urobilinogen measurement by automated test strip (mass/volume)          
  NORMAL             NORMAL        

 

 Urine leukocyte esterase detection by dipstick            2+             
NEGATIVE        

 

 Automated urine sediment erythrocyte count by microscopy (number/high power 
field)            NONE             NRG        

 

 Automated urine sediment leukocyte count by microscopy (number/high power field
)             [HPF]            NRG        

 

 Bacteria detection in urine sediment by light microscopy            TRACE     
        NRG        

 

 Squamous epithelial cells detection in urine sediment by light microscopy     
       2-5             NRG        

 

 Crystals detection in urine sediment by light microscopy            NONE      
       NRG        

 

 Casts detection in urine sediment by light microscopy            NONE         
    NRG        

 

 Mucus detection in urine sediment by light microscopy            NEGATIVE     
        NRG        

 

 Complete urinalysis with reflex to culture            NO             NRG      
  









 Complete blood count (CBC) with automated white blood cell (WBC) differential 
- 16 13:06         









 Blood leukocytes automated count (number/volume)            11.6 10*3/uL      
      4.3-11.0        

 

 Blood erythrocytes automated count (number/volume)            4.23 10*6/uL    
        4.35-5.85        

 

 Venous blood hemoglobin measurement (mass/volume)            13.1 g/dL        
    11.5-16.0        

 

 Blood hematocrit (volume fraction)            38 %            35-52        

 

 Automated erythrocyte mean corpuscular volume            90 [foz_us]          
  80-99        

 

 Automated erythrocyte mean corpuscular hemoglobin (mass per erythrocyte)      
      31 pg            25-34        

 

 Automated erythrocyte mean corpuscular hemoglobin concentration measurement (
mass/volume)            34 g/dL            32-36        

 

 Automated erythrocyte distribution width ratio            12.4 %            
10.0-14.5        

 

 Automated blood platelet count (count/volume)            145 10*3/uL          
  130-400        

 

 Automated blood platelet mean volume measurement            11.2 [foz_us]     
       7.4-10.4        

 

 Automated blood neutrophils/100 leukocytes            66 %            42-75   
     

 

 Automated blood lymphocytes/100 leukocytes            24 %            12-44   
     

 

 Blood monocytes/100 leukocytes            9 %            0-12        

 

 Automated blood eosinophils/100 leukocytes            1 %            0-10     
   

 

 Automated blood basophils/100 leukocytes            0 %            0-10        

 

 Blood neutrophils automated count (number/volume)            7.6 10*3         
   1.8-7.8        

 

 Blood lymphocytes automated count (number/volume)            2.8 10*3         
   1.0-4.0        

 

 Blood monocytes automated count (number/volume)            1.0 10*3            
0.0-1.0        

 

 Automated eosinophil count            0.1 10*3/uL            0.0-0.3        

 

 Automated blood basophil count (count/volume)            0.0 10*3/uL          
  0.0-0.1        









 Comprehensive metabolic panel - 16 13:06         









 Serum or plasma sodium measurement (moles/volume)            137 mmol/L       
     135-145        

 

 Serum or plasma potassium measurement (moles/volume)            4.0 mmol/L    
        3.6-5.0        

 

 Serum or plasma chloride measurement (moles/volume)            109 mmol/L     
               

 

 Carbon dioxide            20 mmol/L            21-32        

 

 Serum or plasma anion gap determination (moles/volume)            8 mmol/L    
        5-14        

 

 Serum or plasma urea nitrogen measurement (mass/volume)            5 mg/dL    
        7-18        

 

 Serum or plasma creatinine measurement (mass/volume)            0.63 mg/dL    
        0.60-1.30        

 

 Serum or plasma urea nitrogen/creatinine mass ratio            8             
NRG        

 

 Serum or plasma creatinine measurement with calculation of estimated 
glomerular filtration rate            >             NRG        

 

 Serum or plasma glucose measurement (mass/volume)            71 mg/dL         
           

 

 Serum or plasma calcium measurement (mass/volume)            9.0 mg/dL        
    8.5-10.1        

 

 Serum or plasma total bilirubin measurement (mass/volume)            0.5 mg/dL
            0.1-1.0        

 

 Serum or plasma alkaline phosphatase measurement (enzymatic activity/volume)  
          251 U/L                    

 

 Serum or plasma aspartate aminotransferase measurement (enzymatic activity/
volume)            17 U/L            5-34        

 

 Serum or plasma alanine aminotransferase measurement (enzymatic activity/volume
)            13 U/L            0-55        

 

 Serum or plasma protein measurement (mass/volume)            6.0 g/dL         
   6.4-8.2        

 

 Serum or plasma albumin measurement (mass/volume)            3.1 g/dL         
   3.2-4.5        









 Serum or plasma uric acid measurement (mass/volume) - 16 13:06         









 Serum or plasma uric acid measurement (mass/volume)            6.4 mg/dL      
      2.6-7.2        









 Lactate dehydrogenase 1 [enzymatic activity/volume] in serum or plasma -  13:06         









 Lactate dehydrogenase 1 [enzymatic activity/volume] in serum or plasma        
    251 U/L            125-220        









 Blood type T Indirect antibody screen panel - 16 13:06         









 ABO+Rh group            BP             NRG        

 

 Transfusion band number            [id number]             [facility]        

 

 Blood group antibody screen            NEGATIVE             NRG        









 Automated blood complete blood count (hemogram) panel - 16 08:47         









 Blood leukocytes automated count (number/volume)            12.2 10*3/uL      
      4.3-11.0        

 

 Blood erythrocytes automated count (number/volume)            4.21 10*6/uL    
        4.35-5.85        

 

 Venous blood hemoglobin measurement (mass/volume)            13.0 g/dL        
    11.5-16.0        

 

 Blood hematocrit (volume fraction)            38 %            35-52        

 

 Automated erythrocyte mean corpuscular volume            90 [foz_us]          
  80-99        

 

 Automated erythrocyte mean corpuscular hemoglobin (mass per erythrocyte)      
      31 pg            25-34        

 

 Automated erythrocyte mean corpuscular hemoglobin concentration measurement (
mass/volume)            34 g/dL            32-36        

 

 Automated erythrocyte distribution width ratio            12.5 %            
10.0-14.5        

 

 Automated blood platelet count (count/volume)            143 10*3/uL          
  130-400        

 

 Automated blood platelet mean volume measurement            11.4 [foz_us]     
       7.4-10.4        









 Comprehensive metabolic panel - 16 08:47         









 Serum or plasma sodium measurement (moles/volume)            136 mmol/L       
     135-145        

 

 Serum or plasma potassium measurement (moles/volume)            3.6 mmol/L    
        3.6-5.0        

 

 Serum or plasma chloride measurement (moles/volume)            108 mmol/L     
               

 

 Carbon dioxide            21 mmol/L            21-32        

 

 Serum or plasma anion gap determination (moles/volume)            7 mmol/L    
        5-14        

 

 Serum or plasma urea nitrogen measurement (mass/volume)            6 mg/dL    
        7-18        

 

 Serum or plasma creatinine measurement (mass/volume)            0.58 mg/dL    
        0.60-1.30        

 

 Serum or plasma urea nitrogen/creatinine mass ratio            10             
NRG        

 

 Serum or plasma creatinine measurement with calculation of estimated 
glomerular filtration rate            >             NRG        

 

 Serum or plasma glucose measurement (mass/volume)            93 mg/dL         
           

 

 Serum or plasma calcium measurement (mass/volume)            8.6 mg/dL        
    8.5-10.1        

 

 Serum or plasma total bilirubin measurement (mass/volume)            0.6 mg/dL
            0.1-1.0        

 

 Serum or plasma alkaline phosphatase measurement (enzymatic activity/volume)  
          248 U/L                    

 

 Serum or plasma aspartate aminotransferase measurement (enzymatic activity/
volume)            18 U/L            5-34        

 

 Serum or plasma alanine aminotransferase measurement (enzymatic activity/volume
)            14 U/L            0-55        

 

 Serum or plasma protein measurement (mass/volume)            5.6 g/dL         
   6.4-8.2        

 

 Serum or plasma albumin measurement (mass/volume)            2.9 g/dL         
   3.2-4.5        









 Serum or plasma uric acid measurement (mass/volume) - 16 08:47         









 Serum or plasma uric acid measurement (mass/volume)            6.2 mg/dL      
      2.6-7.2        









 Lactate dehydrogenase 1 [enzymatic activity/volume] in serum or plasma -  08:47         









 Lactate dehydrogenase 1 [enzymatic activity/volume] in serum or plasma        
    216 U/L            125-220        









 Complete blood count (CBC) with automated white blood cell (WBC) differential 
- 16 05:20         









 Blood leukocytes automated count (number/volume)            12.1 10*3/uL      
      4.3-11.0        

 

 Blood erythrocytes automated count (number/volume)            3.76 10*6/uL    
        4.35-5.85        

 

 Venous blood hemoglobin measurement (mass/volume)            11.7 g/dL        
    11.5-16.0        

 

 Blood hematocrit (volume fraction)            34 %            35-52        

 

 Automated erythrocyte mean corpuscular volume            90 [foz_us]          
  80-99        

 

 Automated erythrocyte mean corpuscular hemoglobin (mass per erythrocyte)      
      31 pg            25-34        

 

 Automated erythrocyte mean corpuscular hemoglobin concentration measurement (
mass/volume)            35 g/dL            32-36        

 

 Automated erythrocyte distribution width ratio            12.4 %            
10.0-14.5        

 

 Automated blood platelet count (count/volume)            129 10*3/uL          
  130-400        

 

 Automated blood platelet mean volume measurement            11.8 [foz_us]     
       7.4-10.4        

 

 Automated blood neutrophils/100 leukocytes            74 %            42-75   
     

 

 Automated blood lymphocytes/100 leukocytes            17 %            12-44   
     

 

 Blood monocytes/100 leukocytes            8 %            0-12        

 

 Automated blood eosinophils/100 leukocytes            1 %            0-10     
   

 

 Automated blood basophils/100 leukocytes            0 %            0-10        

 

 Blood neutrophils automated count (number/volume)            8.9 10*3         
   1.8-7.8        

 

 Blood lymphocytes automated count (number/volume)            2.1 10*3         
   1.0-4.0        

 

 Blood monocytes automated count (number/volume)            1.0 10*3            
0.0-1.0        

 

 Automated eosinophil count            0.1 10*3/uL            0.0-0.3        

 

 Automated blood basophil count (count/volume)            0.0 10*3/uL          
  0.0-0.1        









 Complete blood count (CBC) with automated white blood cell (WBC) differential 
- 18 11:20         









 Blood leukocytes automated count (number/volume)            10.9 10*3/uL      
      4.3-11.0        

 

 Blood erythrocytes automated count (number/volume)            5.43 10*6/uL    
        4.35-5.85        

 

 Venous blood hemoglobin measurement (mass/volume)            16.8 g/dL        
    11.5-16.0        

 

 Blood hematocrit (volume fraction)            48 %            35-52        

 

 Automated erythrocyte mean corpuscular volume            88 [foz_us]          
  80-99        

 

 Automated erythrocyte mean corpuscular hemoglobin (mass per erythrocyte)      
      31 pg            25-34        

 

 Automated erythrocyte mean corpuscular hemoglobin concentration measurement (
mass/volume)            35 g/dL            32-36        

 

 Automated erythrocyte distribution width ratio            13.1 %            
10.0-14.5        

 

 Automated blood platelet count (count/volume)            200 10*3/uL          
  130-400        

 

 Automated blood platelet mean volume measurement            11.0 [foz_us]     
       7.4-10.4        

 

 Automated blood neutrophils/100 leukocytes            67 %            42-75   
     

 

 Automated blood lymphocytes/100 leukocytes            25 %            12-44   
     

 

 Blood monocytes/100 leukocytes            7 %            0-12        

 

 Automated blood eosinophils/100 leukocytes            2 %            0-10     
   

 

 Automated blood basophils/100 leukocytes            0 %            0-10        

 

 Blood neutrophils automated count (number/volume)            7.2 10*3         
   1.8-7.8        

 

 Blood lymphocytes automated count (number/volume)            2.7 10*3         
   1.0-4.0        

 

 Blood monocytes automated count (number/volume)            0.7 10*3            
0.0-1.0        

 

 Automated eosinophil count            0.2 10*3/uL            0.0-0.3        

 

 Automated blood basophil count (count/volume)            0.0 10*3/uL          
  0.0-0.1        









 Streptococcus pyogenes antigen detection - 18 11:20         









 Streptococcus pyogenes antigen detection            NEGATIVE             
NEGATIVE        









 Influenza virus A and B antigen detection - 18 11:20         









 FLU RESULT            NEGATIVE FOR INFLUENZA A AND B ANTIGENS BY IA           
  [facility]        









 Comprehensive metabolic panel - 18 11:20         









 Serum or plasma sodium measurement (moles/volume)            139 mmol/L       
     135-145        

 

 Serum or plasma potassium measurement (moles/volume)            4.2 mmol/L    
        3.6-5.0        

 

 Serum or plasma chloride measurement (moles/volume)            107 mmol/L     
               

 

 Carbon dioxide            24 mmol/L            21-32        

 

 Serum or plasma anion gap determination (moles/volume)            8 mmol/L    
        5-14        

 

 Serum or plasma urea nitrogen measurement (mass/volume)            9 mg/dL    
        7-18        

 

 Serum or plasma creatinine measurement (mass/volume)            0.75 mg/dL    
        0.60-1.30        

 

 Serum or plasma urea nitrogen/creatinine mass ratio            12             
NRG        

 

 Serum or plasma creatinine measurement with calculation of estimated 
glomerular filtration rate            >             NRG        

 

 Serum or plasma glucose measurement (mass/volume)            93 mg/dL         
           

 

 Serum or plasma calcium measurement (mass/volume)            9.8 mg/dL        
    8.5-10.1        

 

 Serum or plasma total bilirubin measurement (mass/volume)            1.1 mg/dL
            0.1-1.0        

 

 Serum or plasma alkaline phosphatase measurement (enzymatic activity/volume)  
          79 U/L                    

 

 Serum or plasma aspartate aminotransferase measurement (enzymatic activity/
volume)            27 U/L            5-34        

 

 Serum or plasma alanine aminotransferase measurement (enzymatic activity/volume
)            41 U/L            0-55        

 

 Serum or plasma protein measurement (mass/volume)            7.6 g/dL         
   6.4-8.2        

 

 Serum or plasma albumin measurement (mass/volume)            4.5 g/dL         
   3.2-4.5        

 

 CALCIUM CORRECTED            9.4 mg/dL            8.5-10.1        









 Bacterial throat culture - 18 11:20         









 Bacterial throat culture            NBS             NRG        









 Urine beta human chorionic gonadotropin (hCG) measurement - 18 12:11    
     









 Urine beta human chorionic gonadotropin (hCG) measurement            NEGATIVE 
            NEGATIVE        









 Complete urinalysis with reflex to culture - 18 12:11         









 Urine color determination            YELLOW             NRG        

 

 Urine clarity determination            CLEAR             NRG        

 

 Urine pH measurement by test strip            5             5-9        

 

 Specific gravity of urine by test strip            1.020             1.016-
1.022        

 

 Urine protein assay by test strip, semi-quantitative            1+             
NEGATIVE        

 

 Urine glucose detection by automated test strip            NEGATIVE           
  NEGATIVE        

 

 Erythrocytes detection in urine sediment by light microscopy            
NEGATIVE             NEGATIVE        

 

 Urine ketones detection by automated test strip            NEGATIVE           
  NEGATIVE        

 

 Urine nitrite detection by test strip            NEGATIVE             NEGATIVE
        

 

 Urine total bilirubin detection by test strip            NEGATIVE             
NEGATIVE        

 

 Urine urobilinogen measurement by automated test strip (mass/volume)          
  1 mg/dL            NORMAL        

 

 Urine leukocyte esterase detection by dipstick            2+             
NEGATIVE        

 

 Automated urine sediment erythrocyte count by microscopy (number/high power 
field)            NONE             NRG        

 

 Automated urine sediment leukocyte count by microscopy (number/high power field
)             [HPF]            NRG        

 

 Bacteria detection in urine sediment by light microscopy            MODERATE  
           NRG        

 

 Squamous epithelial cells detection in urine sediment by light microscopy     
       10-25             NRG        

 

 Crystals detection in urine sediment by light microscopy            NONE      
       NRG        

 

 Casts detection in urine sediment by light microscopy            NONE         
    NRG        

 

 Mucus detection in urine sediment by light microscopy            MODERATE     
        NRG        

 

 Complete urinalysis with reflex to culture            YES             NRG     
   









 Bacterial urine culture - 18 12:11         









 Bacterial urine culture            SEE REPORT             NRG        

 

 COLONY COUNT            .             NRG        



                                                                               
                                                                               
              



Encounters

      





 ACCT No.            Visit Date/Time            Discharge            Status    
        Pt. Type            Provider            Facility            Loc./Unit  
          Complaint        

 

 [id number]            2015 09:30:00            2015 23:59:59          
  CLS            Outpatient            [facility]                    
                           

 

 [id number]            10/02/2013 09:16:00            10/02/2013 23:59:59          
  CLS            Outpatient            [facility]                    
                           

 

 [id number]            2013 11:13:00                                      
Document Registration                                                          
  

 

 [id number]            10/14/2016 05:06:00                                   
   Document Registration                                                       
     

 

 [id number]            2016 13:06:00                                   
   Document Registration                                                       
     

 

 [id number]            2016 10:06:00                                   
   Document Registration                                                       
     

 

 [id number]            2016 08:06:00                                   
   Document Registration                                                       
     

 

 [id number]            2016 07:05:00                                   
   Document Registration                                                       
     

 

 [id number]            12/15/2016 13:05:00                                   
   Document Registration                                                       
     

 

 [id number]            10/05/2016 13:06:00                                   
   Document Registration                                                       
     

 

 [id number]            10/20/2016 18:05:00                                   
   Document Registration                                                       
     

 

 [id number]            10/23/2016 13:05:00                                   
   Document Registration                                                       
     

 

 [id number]            2017 09:05:00            2017 23:59:59          
  CLS            Outpatient            [facility]                     

 

 [id number]            2016 13:06:00                                   
   Document Registration                                                       
     

 

 [id number]            10/08/2016 08:07:00                                   
   Document Registration                                                       
     

 

 [id number]            11/10/2016 13:06:00                                   
   Document Registration                                                       
     

 

 [id number]            2018 10:03:00            2018 13:01:00    
        DIS            Outpatient            ERICA MOHAN            [facility] 
[facility]            ER            VOMITING;COUGH        

 

 [id number]            10/30/2017 21:08:00            10/30/2017 22:01:00    
        DIS            Outpatient            LISBETH KEARNEY            [facility] 
[facility]            ER            COUGH/SOB/VOMITING        

 

 [id number]            2017 10:45:00            2017 23:59:59    
        CLS            Preadmit            CLAUDY MCCOLLUM MD            [facility] 
[facility]            RAD            ABNORMAL QUAD SCREN     
   

 

 [id number]            2016 10:45:00            2017 00:01:00    
        DIS            Outpatient            CLAUDY MCCOLLUM MD            [facility] 
[facility]            RAD            ABNORMAL QUAD SCREN     
   

 

 [id number]            2016 12:39:00            2016 15:40:00    
        DIS            Inpatient            CLAUDY MCCOLLUM MD            [facility] 
[facility]            LDRP            INDUCTION        

 

 [id number]            2016 01:15:00            2016 10:20:00    
        DIS            Outpatient            JAVIER CHOPRA MD            [facility] 
[facility]            WSo            PAIN,HTN,VOMITING        

 

 [id number]            2016 08:20:00            2016 09:49:00    
        DIS            Emergency            BRANDEN FELIZ MD            
[facility]            ER            COUGH/SORE THROAT 
VOMITING        

 

 [id number]            2016 21:25:00            2016 23:45:00    
        DIS            Outpatient            JJ OVIEDO DO            [facility] 
[facility]            WSo            PAIN        

 

 [id number]            2016 11:16:00            2016 23:59:59    
        CLS            Outpatient            CLAUDY MCCOLLUM MD            [facility] 
[facility]            RAD            ABNORMAL QUAD SCREEN    
    

 

 [id number]            2016 10:36:00            2016 13:30:00    
        DIS            Emergency            LISBETH KEARNEY            [facility] 
[facility]            ER            VOMITING DENTAL PAIN 32 
WKS PREG        

 

 [id number]            10/21/2016 12:18:00            10/21/2016 23:59:59    
        CLS            Outpatient            CLAUDY MCCOLLUM MD            [facility] 
[facility]            RAD            PLACENTA LOCATION,THIRD 
TRIMESTER BLEEDING        

 

 [id number]            10/17/2016 21:20:00            10/17/2016 22:47:00    
        DIS            Outpatient            JJ [facility]VIEDO DO            [facility]            SPOTTING        

 

 [id number]            10/11/2016 18:04:00            10/11/2016 22:20:00    
        DIS            Outpatient            CLAUDY MCCOLLUM MD            [facility] 
[facility]            WSo            FALL        

 

 [id number]            2016 10:03:00            2016 23:59:59    
        CLS            Outpatient            CLAUDY MCCOLLUM MD            [facility] 
[facility]            RAD            EVALUATE ANATOMY NOT 
SEEN ON PRIOR SONOGRAM        

 

 [id number]            2016 10:05:00            2016 23:59:59    
        CLS            Outpatient            CLAUDY MCCOLLUM MD            [facility] 
[facility]            RAD            Z34        

 

 [id number]            2016 16:38:00            2016 23:59:59    
        CLS            Outpatient            CLAUDY MCCOLLUM MD            [facility] 
[facility]            RAD            STAT UNABLE TO DOPPLER 
HEART TONES AT REPORTED 20        

 

 [id number]            2016 20:38:00            2016 01:14:00    
        DIS            Emergency            STEFANO ALLEN            
[facility]            ER            BACK PAIN/ABD PAIN/
VOMITING        

 

 [id number]            2015 11:18:00            2015 12:48:00    
        DIS            Emergency            GLEN CHURCH MD            [address] 
[facility]            ER            ABD PAIN        

 

 [id number]            2013 11:27:00            2013 23:59:59    
        [facility]            Outpatient                                              
              

 

 [id number]            2012 19:40:00                                   
   Document Registration

## 2019-06-12 ENCOUNTER — HOSPITAL ENCOUNTER (EMERGENCY)
Dept: HOSPITAL 75 - ER | Age: 29
Discharge: HOME | End: 2019-06-12
Payer: COMMERCIAL

## 2019-06-12 VITALS — HEIGHT: 72 IN | BODY MASS INDEX: 34.4 KG/M2 | WEIGHT: 254 LBS

## 2019-06-12 VITALS — SYSTOLIC BLOOD PRESSURE: 149 MMHG | DIASTOLIC BLOOD PRESSURE: 112 MMHG

## 2019-06-12 DIAGNOSIS — Z87.891: ICD-10-CM

## 2019-06-12 DIAGNOSIS — I10: ICD-10-CM

## 2019-06-12 DIAGNOSIS — Z82.49: ICD-10-CM

## 2019-06-12 DIAGNOSIS — J06.9: Primary | ICD-10-CM

## 2019-06-12 DIAGNOSIS — F41.9: ICD-10-CM

## 2019-06-12 DIAGNOSIS — F31.9: ICD-10-CM

## 2019-06-12 DIAGNOSIS — H92.02: ICD-10-CM

## 2019-06-12 DIAGNOSIS — Z80.0: ICD-10-CM

## 2019-06-12 PROCEDURE — 96372 THER/PROPH/DIAG INJ SC/IM: CPT

## 2019-06-12 PROCEDURE — 99284 EMERGENCY DEPT VISIT MOD MDM: CPT

## 2019-06-12 NOTE — ED EENT
History of Present Illness


General


Chief Complaint:  Ear Problems


Stated Complaint:  L EAR PAIN


Nursing Triage Note:  


Pt reports L ear pain that began on Sunday.  Pt reports tinnitus, and hearing 


loss.  Pt reports hx of allergies.  Pt reports taking IBU and zyrtec PTA.


Source:  patient


Exam Limitations:  no limitations





History of Present Illness


Date Seen by Provider:  Jun 12, 2019


Time Seen by Provider:  09:21


Initial Comments


Here with complaint of left ear pain over the last 3-4 days. States that she has

decreased hearing in the left ear. She's been using over-the-counter earwax 

drops and that's not helping. She does have history of allergies and has been 

taking Zyrtec over-the-counter. She has been taking ibuprofen at nighttime for 

the pain. Denies fever or chills. Denies nausea or vomiting.


Timing/Duration:  gradual


Severity:  moderate


Location:  ear (L), nose


Prearrival Treatment:  over the counter meds


Modifying Factors:  Improves With Rest


Associated Symptoms:  No cough, No ear drainage, No facial pain/swelling, No 

fever; nasal congestion/drainage; No sinus infection





Allergies and Home Medications


Allergies


Coded Allergies:  


     No Known Drug Allergies (Unverified , 5/3/12)





Home Medications


Azithromycin 250 Mg Tablet, 250 MG PO UD


   TAKE 2 TABLETS ON DAY ONE THEN TAKE 1 TABLET DAILY FOR FOUR MORE DAYS 


   Prescribed by: LISBETH KEARNEY on 10/30/17 2142


D-Methorphan Hb/P-Epd HCl/Bpm 118 Ml Syrup, 5 ML PO Q4H PRN for COUGH


   Prescribed by: LISBETH KEARNEY on 10/30/17 2142


Hydrocodone Bit/Acetaminophen 1 Each Tablet, 1-2 TAB PO Q4H PRN for PAIN


   Prescribed by: CLAUDY MCCOLLUM on 12/22/16 1000


Nifedipine 60 Mg Tab.er.24, 60 MG PO DAILY


   Prescribed by: CLAUDY MCCOLLUM on 12/22/16 1000


Nitrofurantoin Monohyd/M-Cryst 100 Mg Capsule, 1 TAB PO BID


   Prescribed by: ERICA MOHAN on 11/7/18 1249


Ondansetron 4 Mg Tab.rapdis, 4 MG PO Q6H PRN for NAUSEA/VOMITING


   Prescribed by: JEANETTE GONZALEZ on 1/14/19 0847


Prenatal Vit/Iron Fumarate/FA 1 Each Tablet, 1 EACH PO DAILY, (Reported)





Patient Home Medication List


Home Medication List Reviewed:  Yes





Review of Systems


Review of Systems


Constitutional:  see HPI


Eyes:  No Symptoms Reported


Ears:  Pain; Denies Bloody Discharge


Nose:  congestion; denies bloody discharge


Mouth:  no symptoms reported


Throat:  no symptoms reported


Respiratory:  cough; No short of breath


Cardiovascular:  no symptoms reported


Gastrointestinal:  no symptoms reported





Past Medical-Social-Family Hx


Past Med/Social Hx:  Reviewed Nursing Past Med/Soc Hx


Patient Social History


Alcohol Use:  Denies Use


Recreational Drug Use:  No


Smoking Status:  Current Everyday Smoker


Type Used:  Cigarettes


Former Smoker, Quit:  Jun 11, 2016


2nd Hand Smoke Exposure:  Yes


Recent Foreign Travel:  No


Contact w/Someone Who Travel:  No


Recent Infectious Disease Expo:  No


Recent Hopitalizations:  No





Immunizations Up To Date


Tetanus Booster (TDap):  Unknown


PED Vaccines UTD:  No


Date of Influenza Vaccine:  Sep 23, 2016





Seasonal Allergies


Seasonal Allergies:  Yes





Past Medical History


Surgeries:  No


Respiratory:  No


Cardiac:  Yes


Hypertension


Neurological:  No


Reproductive Disorders:  No


Sexually Transmitted Disease:  No


HIV/AIDS:  No


Gastrointestinal:  No


Musculoskeletal:  No


Endocrine:  No


Cancer:  No


Psychosocial:  Yes


Anxiety, Bipolar, Depression


Integumentary:  No


Blood Disorders:  No


Adverse Reaction/Blood Tranf:  No





Family Medical History


Reviewed Nursing Family Hx





Asthma


  19 MOTHER


  G8 SISTER


  Maternal grandmother


Colon cancer


  Maternal grandfather


  Paternal grandmother


Diabetes mellitus


  Maternal grandfather


Hypertension


  Maternal grandfather


  Maternal grandmother


No Pertinent Family Hx





Physical Exam


Vital Signs





Vital Signs - First Documented








 6/12/19





 09:12


 


Temp 97.7


 


Pulse 95


 


Resp 16


 


B/P (MAP) 138/113 (121)


 


Pulse Ox 99


 


O2 Delivery Room Air








Height, Weight, BMI


Height: 6'0"


Weight: 254lbs. 0.0oz. 115.401813kf; 39.2 BMI


Method:Stated


General Appearance:  WD/WN, no apparent distress


Eyes:  bilateral eye normal inspection, bilateral eye PERRL, bilateral eye EOMI


Ears:  bilateral ear other (cerumen impaction bilateral. Peaked TM on the right 

shows it to be normal unable to visualize left TM.)


Nose:  other (moderate bilateral nasal congestion with clear rhinorrhea moderate

erythema)


Mouth/Throat:  No pharynx swelling; other (pharyngeal erythema)


Neck:  full range of motion, supple


Cardiovascular:  regular rate, rhythm, no murmur


Respiratory:  lungs clear, normal breath sounds, other (coarse sounds with 

cough)


Neurologic/Psychiatric:  alert, oriented x 3


Skin:  normal color, warm/dry





Progress/Results/Core Measures


Results/Orders


My Orders





Orders - BRANDEN FELIZ MD


Dexamethasone Injection (Decadron Inject (6/12/19 09:30)


Toradol 60 Mg Im (6/12/19 09:30)





Vital Signs/I&O











 6/12/19





 09:12


 


Temp 97.7


 


Pulse 95


 


Resp 16


 


B/P (MAP) 138/113 (121)


 


Pulse Ox 99


 


O2 Delivery Room Air














Blood Pressure Mean:                    121











Progress


Progress Note :  


Progress Note


Seen and evaluated. Decadron 10 mg IM. Toradol 60 mg IM. Discharged home with 

return precautions and patient verbalize understanding instructions and 

agreement with plan. Instructed to follow-up with her doctor regarding blood 

pressure evaluation she reported intent to comply.





Departure


Impression





   Primary Impression:  


   Upper respiratory infection


   Qualified Codes:  J06.9 - Acute upper respiratory infection, unspecified


   Additional Impression:  


   Left ear pain


Disposition:  01 HOME, SELF-CARE


Condition:  Stable





Departure-Patient Inst.


Decision time for Depature:  09:37


Referrals:  


CLAUDY MCCOLLUM MD (PCP/Family)


Primary Care Physician


Patient Instructions:  Ear Wax Impaction (DC), Bacterial Upper Respiratory 

Infection, Adult (DC)





Add. Discharge Instructions:  


All discharge instructions reviewed with patient and/or family. Voiced 

understanding.





You may take ibuprofen 800 mg every 8 hours as needed for pain. You may also 

take Tylenol/acetaminophen 1000 mg every 8 hours as needed for pain. You may use

over-the-counter Debrox or similar earwax removal drops per package directions. 

You need to follow-up with your doctor regarding your blood pressure. Take other

medications as directed. Return for worse pain, fever, vomiting, weakness, 

breathing problems or other concerns as needed.


Scripts


Amoxicillin (Amoxicillin) 500 Mg Capsule


1000 MG PO TID, #42 CAP 0 Refills


   Prov: BRANDEN FELIZ MD         6/12/19


Work/School Note:  Work Release Form   Date Seen in the Emergency Department:  

Jun 12, 2019


   Return to Work:  Jun 13, 2019


   Restrictions:  No Restrictions











BRANDEN FELIZ MD          Jun 12, 2019 09:37

## 2019-11-29 ENCOUNTER — HOSPITAL ENCOUNTER (EMERGENCY)
Dept: HOSPITAL 75 - ER | Age: 29
Discharge: HOME | End: 2019-11-29
Payer: SELF-PAY

## 2019-11-29 VITALS — SYSTOLIC BLOOD PRESSURE: 129 MMHG | DIASTOLIC BLOOD PRESSURE: 71 MMHG

## 2019-11-29 VITALS — HEIGHT: 72 IN | WEIGHT: 235.45 LBS | BODY MASS INDEX: 31.89 KG/M2

## 2019-11-29 DIAGNOSIS — J40: Primary | ICD-10-CM

## 2019-11-29 DIAGNOSIS — F41.9: ICD-10-CM

## 2019-11-29 DIAGNOSIS — Z88.8: ICD-10-CM

## 2019-11-29 DIAGNOSIS — F31.9: ICD-10-CM

## 2019-11-29 DIAGNOSIS — Z80.0: ICD-10-CM

## 2019-11-29 DIAGNOSIS — Z82.49: ICD-10-CM

## 2019-11-29 DIAGNOSIS — I10: ICD-10-CM

## 2019-11-29 DIAGNOSIS — F17.210: ICD-10-CM

## 2019-11-29 PROCEDURE — 87804 INFLUENZA ASSAY W/OPTIC: CPT

## 2019-11-29 PROCEDURE — 87430 STREP A AG IA: CPT

## 2019-11-29 NOTE — ED COUGH/URI
General


Chief Complaint:  Cough/Cold/Flu Symptoms


Stated Complaint:  CONGESTION,RUNNY NOSE,POSS FEVER,SORE THROAT


Source:  patient


Exam Limitations:  no limitations





History of Present Illness


Date Seen by Provider:  Nov 29, 2019


Time Seen by Provider:  05:45


Initial Comments


Patient presents ER by her conveyance with chief complaint for the past 3 weeks 

she's had a nonproductive cough and last several days she's had a fever Tmax 

102. Using ibuprofen. She had a nonproductive cough she smokes about a half a 

pack cigarettes per day. She has body aches. Several sick people in her 

household right now. Both her children were diagnosed with bronchitis recently. 

No history of wheezing, asthma, COPD.





Allergies and Home Medications


Allergies


Coded Allergies:  


     diphenhydramine (Verified  Allergy, Unknown, 11/29/19)





Home Medications


Albuterol Sulfate 1 Puff Puff, 2 PUFF IH Q4H PRN for COUGH


   1 PUFF = 90 MCG 


   Prescribed by: JEANETTE GONZALEZ on 11/29/19 0602


Benzonatate 100 Mg Capsule, 100 MG PO Q6H PRN for COUGH


   Prescribed by: JEANETTE GONZALEZ on 11/29/19 0602





Patient Home Medication List


Home Medication List Reviewed:  Yes





Review of Systems


Review of Systems


Constitutional:  chills, fever, malaise


EENTM:  No ear pain, No eye pain


Respiratory:  cough; No phlegm, No short of breath, No wheezing


Cardiovascular:  No chest pain, No edema


Gastrointestinal:  No abdominal pain, No constipation, No nausea


Genitourinary:  No discharge, No dysuria


Musculoskeletal:  No back pain, No joint pain





All Other Systems Reviewed


Negative Unless Noted:  Yes





Past Medical-Social-Family Hx


Patient Social History


Alcohol Use:  Denies Use


Recreational Drug Use:  No


Smoking Status:  Current Everyday Smoker


Type Used:  Cigarettes


2nd Hand Smoke Exposure:  Yes


Recent Foreign Travel:  No


Contact w/Someone Who Travel:  No


Recent Hopitalizations:  No





Immunizations Up To Date


Tetanus Booster (TDap):  Unknown


PED Vaccines UTD:  No


Date of Influenza Vaccine:  Sep 23, 2016





Seasonal Allergies


Seasonal Allergies:  Yes





Past Medical History


Surgeries:  No


Respiratory:  No


Cardiac:  Yes


Hypertension


Neurological:  No


Reproductive Disorders:  No


Sexually Transmitted Disease:  No


HIV/AIDS:  No


Gastrointestinal:  No


Musculoskeletal:  No


Endocrine:  No


Cancer:  No


Psychosocial:  Yes


Anxiety, Bipolar, Depression


Integumentary:  No


Blood Disorders:  No


Adverse Reaction/Blood Tranf:  No





Family Medical History





Asthma


  19 MOTHER


  G8 SISTER


  Maternal grandmother


Colon cancer


  Maternal grandfather


  Paternal grandmother


Diabetes mellitus


  Maternal grandfather


Hypertension


  Maternal grandfather


  Maternal grandmother


No Pertinent Family Hx





Physical Exam





Vital Signs - First Documented




















Capillary Refill :


Height: 6'0"


Weight: 254lbs. 0.0oz. 115.101664nu; 39.2 BMI


Method:Stated


General Appearance:  WD/WN, no apparent distress


Eyes:  Bilateral Eye Normal Inspection, Bilateral Eye PERRL, Bilateral Eye EOMI


HEENT:  PERRL/EOMI, TMs normal, pharyngeal erythema; No tonsillar exudate


Neck:  full range of motion, supple, normal inspection


Respiratory:  lungs clear, normal breath sounds, no respiratory distress, no 

accessory muscle use


Cardiovascular:  normal peripheral pulses, regular rate, rhythm


Neurologic/Psychiatric:  alert, normal mood/affect, oriented x 3


Skin:  normal color, warm/dry





Progress/Results/Core Measures


Suspected Sepsis


SIRS


Temperature: 


Pulse:  


Respiratory Rate: 


 


Blood Pressure  / 


Mean:





Results/Orders


Lab Results





Laboratory Tests








Test


 11/29/19


05:47 Range/Units


 


 


Group A Streptococcus Screen NEGATIVE  NEGATIVE  








Micro Results





Microbiology


11/29/19 Influenza Types A,B Antigen (TIM) - Final, Complete


           





My Orders





Orders - JEANETTE GONZALEZ


Influenza A And B Antigens (11/29/19 05:50)


Rapid Strep A Screen (11/29/19 05:50)





Vital Signs/I&O











 11/29/19 11/29/19





 05:45 05:45


 


Temp 36.8 


 


Pulse 79 


 


Resp 18 


 


B/P (MAP) 129/71 (90) 


 


Pulse Ox 100 


 


O2 Delivery Room Air Room Air





Capillary Refill :


Progress Note :  


   Time:  05:58


Progress Note


Rapid influenza and strep





Departure


Impression





   Primary Impression:  


   Bronchitis


Disposition:  01 HOME, SELF-CARE


Condition:  Stable





Departure-Patient Inst.


Decision time for Depature:  06:29


Referrals:  


CLAUDY MCCOLLUM MD (PCP/Family)


Primary Care Physician


Patient Instructions:  Bronchiolitis (DC)





Add. Discharge Instructions:  


Drink plenty of fluids. Hot tea with honey and lemon can be helpful. Use a humi

difier and vapor rubs such as Vicks or Mentholatum.


Tylenol and ibuprofen as necessary for fever or body aches.


Tessalon Perles 1 capsule every 6 hours as needed for coughing.


If you have coughing fits you may also try 2 puffs of Proventil every 4 hours as

needed.


All discharge instructions reviewed with patient and/or family. Voiced 

understanding.


Scripts


Albuterol Sulfate (PROAIR HFA) 1 Puff Puff


2 PUFF IH Q4H PRN for COUGH, #1 EA 0 Refills


   1 PUFF = 90 MCG


   Prov: JEANETTE GONZALEZ         11/29/19 


Benzonatate (TESSALON PERLES) 100 Mg Capsule


100 MG PO Q6H PRN for COUGH, #30 CAP 0 Refills


   Prov: JEANETTE GONZALEZ         11/29/19


Work/School Note:  Work Release Form   Date Seen in the Emergency Department:  

Nov 29, 2019


   Return to Work:  Dec 2, 2019


   Restrictions:  No Restrictions











JEANETTE GONZALEZ                 Nov 29, 2019 05:58


POS

## 2020-05-25 ENCOUNTER — HOSPITAL ENCOUNTER (EMERGENCY)
Dept: HOSPITAL 75 - ER | Age: 30
Discharge: HOME | End: 2020-05-25
Payer: SELF-PAY

## 2020-05-25 VITALS — SYSTOLIC BLOOD PRESSURE: 121 MMHG | DIASTOLIC BLOOD PRESSURE: 70 MMHG

## 2020-05-25 VITALS — HEIGHT: 69.69 IN | WEIGHT: 235.45 LBS | BODY MASS INDEX: 34.09 KG/M2

## 2020-05-25 DIAGNOSIS — N39.0: ICD-10-CM

## 2020-05-25 DIAGNOSIS — J06.9: Primary | ICD-10-CM

## 2020-05-25 DIAGNOSIS — Z88.8: ICD-10-CM

## 2020-05-25 DIAGNOSIS — F17.210: ICD-10-CM

## 2020-05-25 DIAGNOSIS — Z80.0: ICD-10-CM

## 2020-05-25 DIAGNOSIS — Z82.49: ICD-10-CM

## 2020-05-25 DIAGNOSIS — Z20.828: ICD-10-CM

## 2020-05-25 LAB
ALBUMIN SERPL-MCNC: 4.1 GM/DL (ref 3.2–4.5)
ALP SERPL-CCNC: 72 U/L (ref 40–136)
ALT SERPL-CCNC: 26 U/L (ref 0–55)
APTT BLD: 26 SEC (ref 24–35)
APTT PPP: (no result) S
BACTERIA #/AREA URNS HPF: (no result) /HPF
BASOPHILS # BLD AUTO: 0 10^3/UL (ref 0–0.1)
BASOPHILS NFR BLD AUTO: 0 % (ref 0–10)
BILIRUB SERPL-MCNC: 0.5 MG/DL (ref 0.1–1)
BILIRUB UR QL STRIP: NEGATIVE
BUN/CREAT SERPL: 16
CALCIUM SERPL-MCNC: 9.2 MG/DL (ref 8.5–10.1)
CHLORIDE SERPL-SCNC: 108 MMOL/L (ref 98–107)
CO2 SERPL-SCNC: 21 MMOL/L (ref 21–32)
CREAT SERPL-MCNC: 0.69 MG/DL (ref 0.6–1.3)
EOSINOPHIL # BLD AUTO: 0.2 10^3/UL (ref 0–0.3)
EOSINOPHIL NFR BLD AUTO: 2 % (ref 0–10)
ERYTHROCYTE [DISTWIDTH] IN BLOOD BY AUTOMATED COUNT: 13.7 % (ref 10–14.5)
FIBRINOGEN PPP-MCNC: (no result) MG/DL
GFR SERPLBLD BASED ON 1.73 SQ M-ARVRAT: > 60 ML/MIN
GLUCOSE SERPL-MCNC: 84 MG/DL (ref 70–105)
GLUCOSE UR STRIP-MCNC: NEGATIVE MG/DL
HCT VFR BLD CALC: 46 % (ref 35–52)
HGB BLD-MCNC: 15.9 G/DL (ref 11.5–16)
INR PPP: 0.9 (ref 0.8–1.4)
KETONES UR QL STRIP: NEGATIVE
LEUKOCYTE ESTERASE UR QL STRIP: (no result)
LYMPHOCYTES # BLD AUTO: 3.6 X 10^3 (ref 1–4)
LYMPHOCYTES NFR BLD AUTO: 32 % (ref 12–44)
MANUAL DIFFERENTIAL PERFORMED BLD QL: NO
MCH RBC QN AUTO: 31 PG (ref 25–34)
MCHC RBC AUTO-ENTMCNC: 35 G/DL (ref 32–36)
MCV RBC AUTO: 89 FL (ref 80–99)
MONOCYTES # BLD AUTO: 0.7 X 10^3 (ref 0–1)
MONOCYTES NFR BLD AUTO: 6 % (ref 0–12)
NEUTROPHILS # BLD AUTO: 6.9 X 10^3 (ref 1.8–7.8)
NEUTROPHILS NFR BLD AUTO: 61 % (ref 42–75)
NITRITE UR QL STRIP: NEGATIVE
PH UR STRIP: 6 [PH] (ref 5–9)
PLATELET # BLD: 201 10^3/UL (ref 130–400)
PMV BLD AUTO: 10.7 FL (ref 7.4–10.4)
POTASSIUM SERPL-SCNC: 3.9 MMOL/L (ref 3.6–5)
PROT SERPL-MCNC: 7.4 GM/DL (ref 6.4–8.2)
PROT UR QL STRIP: (no result)
PROTHROMBIN TIME: 12.7 SEC (ref 12.2–14.7)
RBC #/AREA URNS HPF: (no result) /HPF
SODIUM SERPL-SCNC: 139 MMOL/L (ref 135–145)
SP GR UR STRIP: >=1.03 (ref 1.02–1.02)
SQUAMOUS #/AREA URNS HPF: (no result) /HPF
WBC # BLD AUTO: 11.3 10^3/UL (ref 4.3–11)
WBC #/AREA URNS HPF: (no result) /HPF

## 2020-05-25 PROCEDURE — 83615 LACTATE (LD) (LDH) ENZYME: CPT

## 2020-05-25 PROCEDURE — 85610 PROTHROMBIN TIME: CPT

## 2020-05-25 PROCEDURE — 80053 COMPREHEN METABOLIC PANEL: CPT

## 2020-05-25 PROCEDURE — 87635 SARS-COV-2 COVID-19 AMP PRB: CPT

## 2020-05-25 PROCEDURE — 84703 CHORIONIC GONADOTROPIN ASSAY: CPT

## 2020-05-25 PROCEDURE — 87088 URINE BACTERIA CULTURE: CPT

## 2020-05-25 PROCEDURE — 36415 COLL VENOUS BLD VENIPUNCTURE: CPT

## 2020-05-25 PROCEDURE — 85652 RBC SED RATE AUTOMATED: CPT

## 2020-05-25 PROCEDURE — 93041 RHYTHM ECG TRACING: CPT

## 2020-05-25 PROCEDURE — 81000 URINALYSIS NONAUTO W/SCOPE: CPT

## 2020-05-25 PROCEDURE — 85379 FIBRIN DEGRADATION QUANT: CPT

## 2020-05-25 PROCEDURE — 87430 STREP A AG IA: CPT

## 2020-05-25 PROCEDURE — 87040 BLOOD CULTURE FOR BACTERIA: CPT

## 2020-05-25 PROCEDURE — 71045 X-RAY EXAM CHEST 1 VIEW: CPT

## 2020-05-25 PROCEDURE — 86141 C-REACTIVE PROTEIN HS: CPT

## 2020-05-25 PROCEDURE — 93005 ELECTROCARDIOGRAM TRACING: CPT

## 2020-05-25 PROCEDURE — 83605 ASSAY OF LACTIC ACID: CPT

## 2020-05-25 PROCEDURE — 87804 INFLUENZA ASSAY W/OPTIC: CPT

## 2020-05-25 PROCEDURE — 84145 PROCALCITONIN (PCT): CPT

## 2020-05-25 PROCEDURE — 85025 COMPLETE CBC W/AUTO DIFF WBC: CPT

## 2020-05-25 PROCEDURE — 85730 THROMBOPLASTIN TIME PARTIAL: CPT

## 2020-05-25 NOTE — ED RESPIRATORY
General


Chief Complaint:  Cough/Cold/Flu Symptoms


Stated Complaint:  COUGH, FEVER


Source:  patient





History of Present Illness


Date Seen by Provider:  May 25, 2020


Time Seen by Provider:  11:35


Initial Comments


PT ARRIVES VIA POV FROM HOME


PT STATES SHE HAS HAD A PRODUCTIVE COUGH FOR 2 WEEKS--SPUTUM YELLOW TO GREEN TO 

CLEAR, AND SOMETIMES NON-PRODUCTIVE 


BEGAN HAVING A FEVER LAST NIGHT--. NO FEVER TODAY 


HAS BEEN A LITTLE SHORT OF BREATH OFF AND ON 


NO CHEST PAIN OR PAIN WITH BREATHING


STATES THIS MORNING SHE COUGHED AND THEN THREW UP ONCE. 


NO ACTUAL NAUSEA. NO DIARRHEA. NO ABDOMINAL PAIN








HAS NOT SOUGHT CARE UNTIL TODAY


SYMPTOMS NO DIFFERENT TODAY


HAS NOT TAKEN ANYTHING FOR SYMPTOMS





PT SMOKES 1/2 PPD. 





LMP 05/21/20. NORMAL.





PCP: DR. MCCOLLUM AT [facility]





Allergies and Home Medications


Allergies


Coded Allergies:  


     diphenhydramine (Verified  Allergy, Unknown, 11/29/19)





Home Medications


Albuterol Sulfate 1 Puff Puff, 2 PUFF IH Q4H PRN for COUGH


   1 PUFF = 90 MCG 


   Prescribed by: JEANETTE GONZALEZ on 11/29/19 0602


Benzonatate 100 Mg Capsule, 100 MG PO Q6H PRN for COUGH


   Prescribed by: JEANETTE GONZALEZ on 11/29/19 0602


Cefdinir 300 Mg Capsule, 300 MG PO BID


   Prescribed by: NEVAEH PROCTOR on 5/25/20 1404





Patient Home Medication List


Home Medication List Reviewed:  Yes





Review of Systems


Review of Systems


Constitutional:  see HPI, fever


EENTM:  no symptoms reported; No ear pain, No nose congestion, No throat pain


Respiratory:  see HPI, cough, phlegm, short of breath


Cardiovascular:  no symptoms reported; No chest pain


Gastrointestinal:  see HPI; No abdominal pain, No loss of appetite, No nausea; 

vomiting (DUE TO COUGH)


Genitourinary:  no symptoms reported


LMP:  May 21, 2020


Musculoskeletal:  no symptoms reported


Skin:  no symptoms reported


Psychiatric/Neurological:  No Symptoms Reported


Hematologic/Lymphatic:  No Symptoms Reported


Immunological/Allergic:  no symptoms reported





Past Medical-Social-Family Hx


Past Med/Social Hx:  Reviewed and Corrections made


Patient Social History


Alcohol Use:  Denies Use


Recreational Drug Use:  No


Smoking Status:  Current Everyday Smoker (1/2 PPD)


Type Used:  Cigarettes


2nd Hand Smoke Exposure:  Yes


Recent Hopitalizations:  No





Immunizations Up To Date


Tetanus Booster (TDap):  Unknown


PED Vaccines UTD:  No


Date of Influenza Vaccine:  Sep 23, 2016





Seasonal Allergies


Seasonal Allergies:  Yes





Past Medical History


Surgeries:  No


Respiratory:  No


Cardiac:  Yes (NO MEDICATIONS FOR A YEAR. PER PT ON 05/25/20)


Hypertension


Neurological:  No


Reproductive Disorders:  No


Female Reproductive Disorders:  Denies


Sexually Transmitted Disease:  No


HIV/AIDS:  No


Genitourinary:  No


Gastrointestinal:  No


Musculoskeletal:  No


Endocrine:  No


HEENT:  No


Cancer:  No


Psychosocial:  Yes


Anxiety, Bipolar, Depression


Integumentary:  No


Blood Disorders:  No


Adverse Reaction/Blood Tranf:  No





Family Medical History





Asthma


  19 MOTHER


  G8 SISTER


  Maternal grandmother


Colon cancer


  Maternal grandfather


  Paternal grandmother


Diabetes mellitus


  Maternal grandfather


Hypertension


  Maternal grandfather


  Maternal grandmother


No Pertinent Family Hx





Physical Exam





Vital Signs - First Documented








 5/25/20





 10:40


 


Temp 36.8


 


Pulse 111


 


Resp 18


 


B/P (MAP) 122/86 (98)


 


Pulse Ox 99


 


O2 Delivery Room Air





Capillary Refill :


Height: 6'0"


Weight: 254lbs. 0.0oz. 115.740056ug; 31.00 BMI


Method:Stated


General Appearance:  WD/WN, no apparent distress, obese, other (HAIR DYED 

PURPLE. DOES NOT APPEAR ILL OR TO BE IN ANY DISCOMFORT OR DISTRESS WHATSOEVER. 

NO COUGH NOTED AT ANY TIME. NO DYSPNEA. )


HEENT:  PERRL/EOMI, normal ENT inspection, TMs normal, pharynx normal


Neck:  non-tender, full range of motion, supple, normal inspection


Respiratory:  normal breath sounds, no respiratory distress, no accessory muscle

use


Cardiovascular:  regular rate, rhythm, no edema, no JVD, no murmur


Gastrointestinal:  normal bowel sounds, non tender, soft


Extremities:  normal inspection, no pedal edema, no calf tenderness, normal 

capillary refill


Neurologic/Psychiatric:  CNs II-XII nml as tested, no motor/sensory deficits, 

alert, normal mood/affect, oriented x 3


Skin:  normal color, warm/dry





Focused Exam


Lactate Level


5/25/20 13:20: Lactic Acid Level 0.73





Lactic Acid Level





Laboratory Tests








Test


 5/25/20


13:20


 


Lactic Acid Level


 0.73 MMOL/L


(0.50-2.00)











Progress/Results/Core Measures


Suspected Sepsis


SIRS


Temperature: 


Pulse:  


Respiratory Rate: 


 


Laboratory Tests


5/25/20 13:20: White Blood Count 11.3H


Blood Pressure  / 


Mean: 


 





5/25/20 13:20: Lactic Acid Level 0.73


Laboratory Tests


5/25/20 13:20: 


Creatinine 0.69, INR Comment 0.9, Platelet Count 201, Total Bilirubin 0.5








Results/Orders


Lab Results





Laboratory Tests








Test


 5/25/20


10:38 5/25/20


11:45 5/25/20


13:20 5/25/20


13:25 Range/Units


 


 


Lab Scanned Report


 Referred Lab


Report 


 


 


  [id number]





 


Urine Color  ORANGE     


 


Urine Clarity  SL CLOUDY     


 


Urine pH  6.0    5-9  


 


Urine Specific Gravity  >=1.030    1.016-1.022  


 


Urine Protein  TRACE H   NEGATIVE  


 


Urine Glucose (UA)  NEGATIVE    NEGATIVE  


 


Urine Ketones  NEGATIVE    NEGATIVE  


 


Urine Nitrite  NEGATIVE    NEGATIVE  


 


Urine Bilirubin  NEGATIVE    NEGATIVE  


 


Urine Urobilinogen  0.2    < = 1.0  MG/DL


 


Urine Leukocyte Esterase  2+ H   NEGATIVE  


 


Urine RBC (Auto)  3+ H   NEGATIVE  


 


Urine RBC  0-2     /HPF


 


Urine WBC  25-50 H    /HPF


 


Urine Squamous Epithelial


Cells 


 5-10 


 


 


  /HPF





 


Urine Crystals  NONE     /LPF


 


Urine Bacteria  MODERATE H    /HPF


 


Urine Casts  NONE     /LPF


 


Urine Mucus  SMALL H    /LPF


 


Urine Culture Indicated


 


 CULTURE


PENDING 


 


  





 


White Blood Count


 


 


 11.3 H


 


 4.3-11.0


10^3/uL


 


Red Blood Count


 


 


 5.12 


 


 4.35-5.85


10^6/uL


 


Hemoglobin   15.9   11.5-16.0  G/DL


 


Hematocrit   46   35-52  %


 


Mean Corpuscular Volume   89   80-99  FL


 


Mean Corpuscular Hemoglobin   31   25-34  PG


 


Mean Corpuscular Hemoglobin


Concent 


 


 35 


 


 32-36  G/DL





 


Red Cell Distribution Width   13.7   10.0-14.5  %


 


Platelet Count


 


 


 201 


 


 130-400


10^3/uL


 


Mean Platelet Volume   10.7 H  7.4-10.4  FL


 


Neutrophils (%) (Auto)   61   42-75  %


 


Lymphocytes (%) (Auto)   32   12-44  %


 


Monocytes (%) (Auto)   6   0-12  %


 


Eosinophils (%) (Auto)   2   0-10  %


 


Basophils (%) (Auto)   0   0-10  %


 


Neutrophils # (Auto)   6.9   1.8-7.8  X 10^3


 


Lymphocytes # (Auto)   3.6   1.0-4.0  X 10^3


 


Monocytes # (Auto)   0.7   0.0-1.0  X 10^3


 


Eosinophils # (Auto)


 


 


 0.2 


 


 0.0-0.3


10^3/uL


 


Basophils # (Auto)


 


 


 0.0 


 


 0.0-0.1


10^3/uL


 


Erythrocyte Sedimentation Rate   4   0-20  MM/HR


 


Prothrombin Time   12.7   12.2-14.7  SEC


 


INR Comment   0.9   0.8-1.4  


 


Activated Partial


Thromboplast Time 


 


 26 


 


 24-35  SEC





 


D-Dimer


 


 


 0.29 


 


 0.00-0.49


UG/ML


 


Sodium Level   139   135-145  MMOL/L


 


Potassium Level   3.9   3.6-5.0  MMOL/L


 


Chloride Level   108 H    MMOL/L


 


Carbon Dioxide Level   21   21-32  MMOL/L


 


Anion Gap   10   5-14  MMOL/L


 


Blood Urea Nitrogen   11   7-18  MG/DL


 


Creatinine


 


 


 0.69 


 


 0.60-1.30


MG/DL


 


Estimat Glomerular Filtration


Rate 


 


 > 60 


 


  





 


BUN/Creatinine Ratio   16    


 


Glucose Level   84     MG/DL


 


Lactic Acid Level


 


 


 0.73 


 


 0.50-2.00


MMOL/L


 


Calcium Level   9.2   8.5-10.1  MG/DL


 


Corrected Calcium   9.1   8.5-10.1  MG/DL


 


Total Bilirubin   0.5   0.1-1.0  MG/DL


 


Aspartate Amino Transf


(AST/SGOT) 


 


 21 


 


 5-34  U/L





 


Alanine Aminotransferase


(ALT/SGPT) 


 


 26 


 


 0-55  U/L





 


Alkaline Phosphatase   72     U/L


 


Lactate Dehydrogenase   193   125-220  U/L


 


C-Reactive Protein High


Sensitivity 


 


 0.50 


 


 0.00-0.50


MG/DL


 


Total Protein   7.4   6.4-8.2  GM/DL


 


Albumin   4.1   3.2-4.5  GM/DL


 


Procalcitonin   0.02   <0.10  NG/ML


 


Serum Pregnancy Test,


Qualitative 


 


 NEGATIVE 


 


 NEGATIVE  





 


Coronavirus (COVID-19)(PCR)    Negative  Negative  


 


Group A Streptococcus Screen    NEGATIVE  NEGATIVE  








Micro Results





Microbiology


5/25/20 Throat Culture - Final, Complete


          Strep, Beta Hemolytic Group C


5/25/20 Influenza Types A,B Antigen (TIM) - Final, Complete


          


5/25/20 Blood Culture - Preliminary, Resulted


          No growth


5/25/20 Blood Culture - Preliminary, Resulted


          No growth


5/25/20 Urine Culture - Final, Complete


          3 or more isolates





My Orders





Orders - NEVAEH PROCTOR DO


Ekg Tracing (5/25/20 13:52)


Monitor-Rhythm Ecg Trace Only (5/25/20 13:52)


Fibrin Degradation Products (5/25/20 13:54)


Procalcitonin (Pct) (5/25/20 13:54)


Hs C Reactive Protein (5/25/20 13:54)


Erythrocyte Sedimentation Rate (5/25/20 13:54)


LDH (5/25/20 13:54)





Vital Signs/I&O











 5/25/20 5/25/20





 10:40 14:41


 


Temp 36.8 


 


Pulse 111 91


 


Resp 18 18


 


B/P (MAP) 122/86 (98) 121/70


 


Pulse Ox 99 99


 


O2 Delivery Room Air Room Air





Capillary Refill :


Progress Note :  


Progress Note


PPE WORN AT ALL TIMES


COVID-19 TESTING PERFORMED. 





NO COUGH OR DYSPNEA AT ANY TIME


NO FEVER AT ANY TIME


NO SYMPTOMS DURING ER STAY





ECG


Initial ECG Impression Date:  May 25, 2020


Initial ECG Impression Time:  11:48


Initial ECG Rate:  86


Initial ECG Rhythm:  Normal Sinus


Initial ECG Impression:  Normal





Diagnostic Imaging





Comments


CXR--NO ACUTE PROCESS, PER RADIOLOGIST REPORT AT 1304


   Reviewed:  Reviewed by Me





Departure


Impression





   Primary Impression:  


   Upper respiratory infection


   Additional Impressions:  


   COVID P.U.I.


   UTI (urinary tract infection)


Disposition:  01 HOME, SELF-CARE


Condition:  Stable





Departure-Patient Inst.


Referrals:  


CLAUDY MCCOLLUM MD (PCP/Family)


Primary Care Physician


Patient Instructions:  Coronavirus Disease 2019 (COVID-19) (DC), Cough, Runny 

Nose, and the Common Cold (DC), Urinary Tract Infection, Adult (DC)





Add. Discharge Instructions:  


LOTS OF CLEAR LIQUIDS





TYLENOL AS NEEDED FOR PAIN OR FEVER





OVER THE COUNTER MUCINEX DM FOR COUGH





FOLLOW UP WITH [facility] IN 4-5 DAYS IF NO BETTER, RETURN TO ER IF WORSE





All discharge instructions reviewed with patient and/or family. Voiced 

understanding.


Scripts


Cefdinir (Cefdinir) 300 Mg Capsule


300 MG PO BID, #20 CAP


   Prov: NEVAEH PROCTOR DO         5/25/20











NEVAEH PROCTOR DO                 May 25, 2020 13:16

## 2020-05-25 NOTE — XMS REPORT
Continuity of Care Document

                             Created on: 2020



DAMIAN KEN

External Reference #: [id number]

: 1990

Sex: Female



Demographics





                          Home Phone                [phone]

 

                          Preferred Language        Unknown

 

                          Marital Status            Unknown

 

                          Amish Affiliation     Unknown

 

                          Race                      Unknown

 

                          Ethnic Group              Unknown





Author





                          Organization              Unknown

 

                          Address                   Unknown

 

                          Phone                     Unavailable



              



Allergies

      



             Active           Description           Code           Type         

  Severity   

                Reaction           Onset           Reported/Identified          

 

Relationship to Patient                 Clinical Status        

 

                Yes             No Known Drug Allergies           [id number]    

       Drug 

Allergy           Unknown           N/A                             2012  

      

                                                             

 

                Yes             diphenhydramine           [id number]           D

rug Allergy       

           Unknown           N/A                       2019               

        

        



                    



Medications

      



There is no data.                  



Problems

      



             Date Dx Coded           Attending           Type           Code    

       

Diagnosis                               Diagnosed By        

 

             2011                                     626.4           IRRE

GULAR MENSTRUAL

CYCLE                                            

 

             2011                                     V65.45           STD

 COUNSELING    

                                                 

 

             2011                                     V72.31           GYN

 EXAM, ROUTINE 

                                                 

 

             2011                                     V74.5           STD 

SCREEN         

                                                 

 

             2011           BUCKY VALENCIA A                        62

6.4           

IRREGULAR MENSTRUAL CYCLE                        

 

                2011           BUCKY VALENCIA A                       

    V65.45          

                          STD COUNSELING                     

 

                2011           KASSANDRA VALENCIAIDI A                       

    V72.31          

                          GYN EXAM, ROUTINE                    

 

             2011           KASSANDRA VALENCIAIDI A                        V7

4.5           

STD SCREEN                                       

 

             2011           KASSANDRA VALENCIAIDI A                        62

6.4           

IRREGULAR MENSTRUAL CYCLE                        

 

                2011           KASSANDRA VALENCIAIDI A                       

    V65.45          

                          STD COUNSELING                     

 

                2011           KASSANDRA VALENCIAIDI A                       

    V72.31          

                          GYN EXAM, ROUTINE                    

 

             2011           KASSANDRA VALENCIAIDI A                        V7

4.5           

STD SCREEN                                       

 

             2012                        Ot           305.20           CAN

NABIS ABUSE-

UNSPEC                                           

 

             2012                        Ot           780.2           SYNC

OPE AND 

COLLAPSE                                         

 

             2012                        Ot           923.11           CON

TUSION OF 

ELBOW                                            

 

             2012                        Ot           E000.0           CIV

VANGIE ACTIVITY

DONE FOR INCOME OR PAY                           

 

             2012                        Ot           E849.6           ACC

IDENT IN 

PUBLIC BLDG                                      

 

             2012                        Ot           E888.9           FAL

L NOS         

                                                 

 

             2013                                     564.00           CON

STIPATION      

                                                 

 

             2013                                     789.04           ABD

OMINAL PAIN 

LEFT LOWER QUADRANT                              

 

                2013           BUCYK VALENCIA                       

    564.00          

                          CONSTIPATION                       

 

                2013           ROCHELLE VENTURA, BUCKY A                       

    789.04          

                          ABDOMINAL PAIN LEFT LOWER QUADRANT                    

 

                2013           ROCHELLE VENTURA, BUCKY A                       

    564.00          

                          CONSTIPATION                       

 

                2013           ROCHELLE VENTURA, BUCKY A                       

    789.04          

                          ABDOMINAL PAIN LEFT LOWER QUADRANT                    

 

                10/02/2013           ROCHELLE VENTURA, BUCKY A                       

    131.01          

                          TRICHOMONAL VULVOVAGINITIS                    

 

                10/02/2013           ROCHELLE VENTURA, BUCKY A                       

    V76.10          

                          BREAST CANCER SCREENING                    

 

             10/02/2013           ROCHELLE VENTURA, BUCKY A                        V7

6.2           

CERVICAL CANCER SCREENING (PAP SMEAR)                    

 

                10/02/2013           ROCHELLE VENTURA, BUCKY A                       

    131.01          

                          TRICHOMONAL VULVOVAGINITIS                    

 

                10/02/2013           ROCHELLE VENTURA, BUCKY A                       

    V76.10          

                          BREAST CANCER SCREENING                    

 

             10/02/2013           ROCHELLE VENTURA, BUCKY A                        V7

6.2           

CERVICAL CANCER SCREENING (PAP SMEAR)                    

 

                2015           KASSANDRA VALENCIAIDI A                       

    611.71          

                          MASTODYNIA                         

 

                2015           ROCHELLE VENTURA, BUCKY A                       

    787.01          

                          NAUSEA WITH VOMITING                    

 

                2015           ROCHELLE VENTURA, BUCKY A                       

    787.91          

                          DIARRHEA                           

 

                2015           ROCHELLE VENTURA, BUCKY A                       

    V73.81          

                          HPV SCREENING                      

 

                2015           RANJIT LOPEZ, [place]           Ot            

  F17.210          

                          NICOTINE DEPENDENCE, CIGARETTES, UNCOMPL              

      

 

             2015           RANJIT LOPEZ, [place]           Ot           R10.

11           

RIGHT UPPER QUADRANT PAIN                        

 

             2016           STEFANO ALLEN           Ot           J

40           

BRONCHITIS, NOT SPECIFIED AS ACUTE OR CH                    

 

                2016           STEFANO ALLEN           Ot         

     R10.31        

                          RIGHT LOWER QUADRANT PAIN                    

 

             2016           CLAUDY MCCOLLUM MD, Ot           Z33

.1           

PREGNANT STATE, INCIDENTAL                       

 

                2016           CLAUDY MCCOLLUM MD, Ot           

   Z3A.08          

                          8 WEEKS GESTATION OF PREGNANCY                    

 

             2016           CLAUDY MCCOLLUM MD, Ot           Z33

.1           

PREGNANT STATE, INCIDENTAL                       

 

                2016           CLAUDY MCCOLLUM MD, Ot           

   Z3A.08          

                          8 WEEKS GESTATION OF PREGNANCY                    

 

             2016           CLAUDY MCCOLLUM MD           Ot           Z33

.1           

PREGNANT STATE, INCIDENTAL                       

 

                2016           CHUN MD, CLAUDY N           Ot           

   Z3A.08          

                          8 WEEKS GESTATION OF PREGNANCY                    

 

                2016           CLAUDY MCCOLLUM MD, Ot           

   Z34.00          

                          ENCNTR FOR SUPRVSN OF NORMAL FIRST PREGN              

      

 

                2016           CLAUDY MCCOLLUM MD           Ot           

   Z34.82          

                          ENCOUNTER FOR SUPRVSN OF NORMAL PREGNANC              

      

 

             2016           CLAUDY MCCOLLUM MD           Ot           Z36

           

ENCOUNTER FOR  SCREENING OF MOT                    

 

                2016           CLAUDY MCCOLLUM MD           Ot           

   Z3A.22          

                          22 WEEKS GESTATION OF PREGNANCY                    

 

                2016           CLAUDY MCCOLLUM MD, Ot           

   Z34.82          

                          ENCOUNTER FOR SUPRVSN OF NORMAL PREGNANC              

      

 

             2016           CLAUDY MCCOLLUM MD           Ot           Z36

           

ENCOUNTER FOR  SCREENING OF MOT                    

 

                2016           CLAUDY MCCOLLUM MD, Ot           

   Z3A.22          

                          22 WEEKS GESTATION OF PREGNANCY                    

 

             10/11/2016           CLAUDY MCCOLLUM MD, Ot           Z33

.1           

PREGNANT STATE, INCIDENTAL                       

 

                10/11/2016           CLAUDY MCCOLLUM MD, Ot           

   Z3A.08          

                          8 WEEKS GESTATION OF PREGNANCY                    

 

                10/11/2016           CLAUDY MCCOLLUM MD, Ot           

   Z34.00          

                          ENCNTR FOR SUPRVSN OF NORMAL FIRST PREGN              

      

 

                10/11/2016           CLAUDY MCCOLLUM MD, Ot           

   Z34.82          

                          ENCOUNTER FOR SUPRVSN OF NORMAL PREGNANC              

      

 

             10/11/2016           CLAUDY MCCOLLUM MD           Ot           Z36

           

ENCOUNTER FOR  SCREENING OF MOT                    

 

                10/11/2016           CLAUDY MCCOLLUM MD, Ot           

   Z3A.22          

                          22 WEEKS GESTATION OF PREGNANCY                    

 

                10/11/2016           CLAUDY MCCOLLUM MD, Ot           

   O9A.213         

                          INJ/POISN/OTH CONSEQ OF EXTERNAL CAUSES               

      

 

             10/11/2016           CLAUDY MCCOLLUM MD, Ot           R55

           

SYNCOPE AND COLLAPSE                             

 

                10/11/2016           CLAUDY MCCOLLUM MD, Ot           

   W19.XXXA        

                          UNSPECIFIED FALL, INITIAL ENCOUNTER                   

 

 

             10/11/2016           CLAUDY MCCOLLUM MD, Ot           Y99

.8           

OTHER EXTERNAL CAUSE STATUS                      

 

                10/11/2016           CLAUDY MCCOLLUM MD           Ot           

   Z3A.28          

                          28 WEEKS GESTATION OF PREGNANCY                    

 

             10/17/2016           JJ OVIEDO DO           Ot           O26.85

3           

SPOTTING COMPLICATING PREGNANCY, THIRD T                    

 

             10/17/2016           JJ OVIEDO DO           Ot           Z3A.28

           28

WEEKS GESTATION OF PREGNANCY                     

 

             10/20/2016           CLAUDY MCCOLLUM MD           Ot           Z33

.1           

PREGNANT STATE, INCIDENTAL                       

 

                10/20/2016           CLAUDY MCCOLLUM MD           Ot           

   Z3A.08          

                          8 WEEKS GESTATION OF PREGNANCY                    

 

                10/20/2016           CLAUDY MCCOLLUM MD           Ot           

   Z34.00          

                          ENCNTR FOR SUPRVSN OF NORMAL FIRST PREGN              

      

 

                10/20/2016           CLAUDY MCCOLLUM MD           Ot           

   Z34.82          

                          ENCOUNTER FOR SUPRVSN OF NORMAL PREGNANC              

      

 

             10/20/2016           CLAUDY MCCOLLUM MD           Ot           Z36

           

ENCOUNTER FOR  SCREENING OF MOT                    

 

                10/20/2016           CLAUDY MCCOLLUM MD           Ot           

   Z3A.22          

                          22 WEEKS GESTATION OF PREGNANCY                    

 

                10/24/2016           CLAUDY MCCOLLUM MD           Ot           

   O46.93          

                          ANTEPARTUM HEMORRHAGE, UNSPECIFIED, THIR              

      

 

                10/24/2016           CLAUDY MCCOLLUM MD           Ot           

   Z3A.00          

                          WEEKS OF GESTATION OF PREGNANCY NOT SPEC              

      

 

                10/26/2016           CLAUDY MCCOLLUM MD           Ot           

   O46.93          

                          ANTEPARTUM HEMORRHAGE, UNSPECIFIED, THIR              

      

 

                10/26/2016           CLAUDY MCCOLLUM MD           Ot           

   Z3A.00          

                          WEEKS OF GESTATION OF PREGNANCY NOT SPEC              

      

 

             2016           OVIEDO DOITZELA K           Ot           O26.85

3           

SPOTTING COMPLICATING PREGNANCY, THIRD T                    

 

             2016           OVIEDO DOITZELA K           Ot           Z3A.28

           28

WEEKS GESTATION OF PREGNANCY                     

 

             2016           CLAUDY MCCOLLUM MD           Ot           Z33

.1           

PREGNANT STATE, INCIDENTAL                       

 

                2016           CLAUDY MCCOLLUM MD           Ot           

   Z3A.08          

                          8 WEEKS GESTATION OF PREGNANCY                    

 

                2016           CLAUDY MCCOLLUM MD           Ot           

   Z34.00          

                          ENCNTR FOR SUPRVSN OF NORMAL FIRST PREGN              

      

 

                2016           CLAUDY MCCOLLUM MD           Ot           

   Z34.82          

                          ENCOUNTER FOR SUPRVSN OF NORMAL PREGNANC              

      

 

             2016           CLAUDY MCCOLLUM MD           Ot           Z36

           

ENCOUNTER FOR  SCREENING OF MOT                    

 

                2016           CLAUDY MCCOLLUM MD           Ot           

   Z3A.22          

                          22 WEEKS GESTATION OF PREGNANCY                    

 

                2016           CLAUDY MCCOLLUM MD           Ot           

   O46.93          

                          ANTEPARTUM HEMORRHAGE, UNSPECIFIED, THIR              

      

 

                2016           CLAUDY MCCOLLUM MD           Ot           

   Z3A.00          

                          WEEKS OF GESTATION OF PREGNANCY NOT SPEC              

      

 

                2016           KEARNEY, PETER J APRN           Ot           

   F17.210         

                          NICOTINE DEPENDENCE, CIGARETTES, UNCOMPL              

      

 

             2016           LISBETH KEARNEY APRN           Ot           K04

.7           

PERIAPICAL ABSCESS WITHOUT SINUS                    

 

                2016           LISBETH KEARNEY APRN           Ot           

   O23.43          

                          UNSP INFCT OF URINARY TRACT IN PREGNANCY              

      

 

                2016           LISBETH KEARNEY APRN           Ot           

   O26.893         

                          OTH PREGNANCY RELATED CONDITIONS, THIRD               

      

 

                2016           LISBETH KEARNEY APRN           Ot           

   O99.333         

                          SMOKING (TOBACCO) COMPLICATING PREGNANCY              

      

 

                2016           LSIBETH KEARNEY APRN           Ot           

   O99.89          

                          OTH DISEASES AND CONDITIONS COMPL PREG/C              

      

 

                2016           LISBETH KEARNEY APRN           Ot           

   R10.30          

                          LOWER ABDOMINAL PAIN, UNSPECIFIED                    

 

             2016           LISBETH KEARNEY APRN           Ot           R11

.2           

NAUSEA WITH VOMITING, UNSPECIFIED                    

 

                2016           LISBETH KEARNEY           Ot           

   Z3A.31          

                          31 WEEKS GESTATION OF PREGNANCY                    

 

                2016           LISBETH KEARNEY           Ot           

   F17.210         

                          NICOTINE DEPENDENCE, CIGARETTES, UNCOMPL              

      

 

             2016           LISBETH KEARNEY           Ot           K04

.7           

PERIAPICAL ABSCESS WITHOUT SINUS                    

 

                2016           LISBETH KEARNEY APRN           Ot           

   O23.43          

                          UNSP INFCT OF URINARY TRACT IN PREGNANCY              

      

 

                2016           LISBETH KEARNEY           Ot           

   O26.893         

                          OTH PREGNANCY RELATED CONDITIONS, THIRD               

      

 

                2016           LISBETH KEARNEY APRN           Ot           

   O99.333         

                          SMOKING (TOBACCO) COMPLICATING PREGNANCY              

      

 

                2016           LISBETH KEARNEY APRN           Ot           

   O99.89          

                          OTH DISEASES AND CONDITIONS COMPL PREG/C              

      

 

                2016           LISBETH KEARNEY APRKATTY           Ot           

   R10.30          

                          LOWER ABDOMINAL PAIN, UNSPECIFIED                    

 

             2016           LISBETH KEARNEY APRKATTY           Ot           R11

.2           

NAUSEA WITH VOMITING, UNSPECIFIED                    

 

                2016           LSIBETH KEARNEY           Ot           

   Z3A.31          

                          31 WEEKS GESTATION OF PREGNANCY                    

 

                2016           CLAUDY MCCOLLUM MD           Ot           

   O46.93          

                          ANTEPARTUM HEMORRHAGE, UNSPECIFIED, THIR              

      

 

                2016           CLAUDY MCCOLLUM MD           Ot           

   Z3A.00          

                          WEEKS OF GESTATION OF PREGNANCY NOT SPEC              

      

 

                2016           CLAUDY MCCOLLUM MD           Ot           

   Z34.82          

                          ENCOUNTER FOR SUPRVSN OF NORMAL PREGNANC              

      

 

             2016           CLAUDY MCCOLLUM MD           Ot           Z36

           

ENCOUNTER FOR  SCREENING OF MOT                    

 

                2016           CLAUDY MCCOLLUM MD           Ot           

   Z3A.22          

                          22 WEEKS GESTATION OF PREGNANCY                    

 

                2016           CLAUDY MCCOLLUM MD           Ot           

   Z34.82          

                          ENCOUNTER FOR SUPRVSN OF NORMAL PREGNANC              

      

 

             2016           CLAUDY MCCOLLUM MD           Ot           Z36

           

ENCOUNTER FOR  SCREENING OF MOT                    

 

                2016           CLAUDY MCCOLLUM MD           Ot           

   Z3A.22          

                          22 WEEKS GESTATION OF PREGNANCY                    

 

             2016           CLAUDY MCCOLLUM MD           Ot           O28

.0           

ABNORMAL HEMATOLOG FINDING ON                      

 

             2016           OVIEDO DOITZELA K           Ot           O47.03

           

FALSE LABOR BEFORE 37 COMPLETED WEEKS OF                    

 

             2016           OVIEDO DOITZELA K           Ot           Z3A.35

           35

WEEKS GESTATION OF PREGNANCY                     

 

             2016           CLAUDY MCCOLLUM MD           Ot           O28

.0           

ABNORMAL HEMATOLOG FINDING ON                      

 

                2016           CLAUDY MCCOLLUM MD           Ot           

   Z3A.34          

                          34 WEEKS GESTATION OF PREGNANCY                    

 

                2016           BRANDEN FELIZ MD           Ot        

      J02.9        

                          ACUTE PHARYNGITIS, UNSPECIFIED                    

 

                2016           BRANDEN FELIZ MD           Ot        

      J06.9        

                          ACUTE UPPER RESPIRATORY INFECTION, UNSPE              

      

 

                2016           BRANDEN FELIZ MD           Ot        

      O14.93       

                          UNSPECIFIED PRE-ECLAMPSIA, THIRD TRIMEST              

      

 

                2016           BRANDEN FELIZ MD           Ot        

      O99.333      

                          SMOKING (TOBACCO) COMPLICATING PREGNANCY              

      

 

                2016           BRANDEN FELIZ MD           Ot        

      O99.513      

                          DISEASES OF THE RESP SYS COMP PREGNANCY,              

      

 

                2016           BRANDEN FELIZ MD           Ot        

      J02.9        

                          ACUTE PHARYNGITIS, UNSPECIFIED                    

 

                2016           BRANDEN FELIZ MD           Ot        

      J06.9        

                          ACUTE UPPER RESPIRATORY INFECTION, UNSPE              

      

 

                2016           BRANDEN FELIZ MD           Ot        

      O14.93       

                          UNSPECIFIED PRE-ECLAMPSIA, THIRD TRIMEST              

      

 

                2016           BRANDEN FELIZ MD           Ot        

      O99.333      

                          SMOKING (TOBACCO) COMPLICATING PREGNANCY              

      

 

                2016           BRANDEN FELIZ MD           Ot        

      O99.513      

                          DISEASES OF THE RESP SYS COMP PREGNANCY,              

      

 

             2016           CLAUDY MCCOLLUM MD           Ot           O28

.0           

ABNORMAL HEMATOLOG FINDING ON                      

 

                2016           CLAUDY MCCOLLUM MD           Ot           

   Z3A.34          

                          34 WEEKS GESTATION OF PREGNANCY                    

 

                2016           JAVIER CHOPRA MD, Ot            

  F17.210          

                          NICOTINE DEPENDENCE, CIGARETTES, UNCOMPL              

      

 

             2016           JAVIER CHOPRA MD, Ot           O14.

03           

MILD TO MODERATE PRE-ECLAMPSIA, THIRD TR                    

 

                2016           JAVIER CHOPRA MD, Ot            

  O99.333          

                          SMOKING (TOBACCO) COMPLICATING PREGNANCY              

      

 

             2016           JAVIER CHOPRA MD, Ot           Z3A.

37           

37 WEEKS GESTATION OF PREGNANCY                    

 

                2016           JAVIER CHOPRA MD, Ot            

  F17.210          

                          NICOTINE DEPENDENCE, CIGARETTES, UNCOMPL              

      

 

             2016           JAVIER CHOPRA MD, Ot           O14.

03           

MILD TO MODERATE PRE-ECLAMPSIA, THIRD TR                    

 

                2016           JAVIER CHOPRA MD, Ot            

  O99.333          

                          SMOKING (TOBACCO) COMPLICATING PREGNANCY              

      

 

             2016           JAVIER CHOPRA MD, Ot           Z3A.

37           

37 WEEKS GESTATION OF PREGNANCY                    

 

             2016           CLAUDY MCCOLLUM MD           Ot           O28

.0           

ABNORMAL HEMATOLOG FINDING ON                      

 

                2016           CLAUDY MCCOLLUM MD           Ot           

   Z3A.34          

                          34 WEEKS GESTATION OF PREGNANCY                    

 

             2016           CLAUDY MCCOLLUM MD           Ot           O28

.0           

ABNORMAL HEMATOLOG FINDING ON                      

 

                2016           CLAUDY MCCOLLUM MD           Ot           

   Z3A.34          

                          34 WEEKS GESTATION OF PREGNANCY                    

 

             2016           ITZEL OVIEDO DOA K           Ot           O47.03

           

FALSE LABOR BEFORE 37 COMPLETED WEEKS OF                    

 

             2016           ITZEL OVIEDO DOA K           Ot           Z3A.35

           35

WEEKS GESTATION OF PREGNANCY                     

 

                2016           CLAUDY MCCOLLUM MD           Ot           

   F17.210         

                          NICOTINE DEPENDENCE, CIGARETTES, UNCOMPL              

      

 

                2016           CLAUDY MCCOLLUM MD           Ot           

   O10.013         

                          PRE-EXISTING ESSENTIAL HTN COMP PREGNANC              

      

 

                2016           CLAUDY MCCOLLUM MD           Ot           

   O41.03X0        

                          OLIGOHYDRAMNIOS, THIRD TRIMESTER, NOT AP              

      

 

             2016           CLAUDY MCCOLLUM MD           Ot           O70

.0           

FIRST DEGREE PERINEAL LACERATION DURING                     

 

             2016           CLAUDY MCCOLLUM MD           Ot           O72

.1           

OTHER IMMEDIATE POSTPARTUM HEMORRHAGE                    

 

             2016           CLAUDY MCCOLLUM MD           Ot           O76

           

ABNLT IN FETAL HEART RATE AND RHYTHM COM                    

 

                2016           CLAUDY MCCOLLUM MD, Ot           

   O99.333         

                          SMOKING (TOBACCO) COMPLICATING PREGNANCY              

      

 

             2016           CLAUDY MCCOLLUM MD           Ot           Z37

.0           

SINGLE LIVE BIRTH                                

 

                2016           CLAUDY MCCOLLUM MD, Ot           

   Z3A.37          

                          37 WEEKS GESTATION OF PREGNANCY                    

 

             2017           CLAUDY MCCOLLUM MD           Ot           O13

.3           

GESTATIONAL HTN W/O SIGNIFICANT PROTEINU                    

 

             2017           CLAUDY MCCOLLUM MD           Ot           O28

.0           

ABNORMAL HEMATOLOG FINDING ON                      

 

                2017           CLAUDY MCCOLLUM MD, Ot           

   Z3A.34          

                          34 WEEKS GESTATION OF PREGNANCY                    

 

             2017           CLAUDY MCCOLLUM MD           Ot           O28

.0           

ABNORMAL HEMATOLOG FINDING ON                      

 

                2017           CLAUDY MCCOLLUM MD, Ot           

   Z3A.34          

                          34 WEEKS GESTATION OF PREGNANCY                    

 

             10/30/2017           CLAUDY MCCOLLUM MD           Ot           Z33

.1           

PREGNANT STATE, INCIDENTAL                       

 

                10/30/2017           CLAUDY MCCOLLUM MD           Ot           

   Z3A.08          

                          8 WEEKS GESTATION OF PREGNANCY                    

 

                10/30/2017           CLAUDY MCCOLLUM MD, Ot           

   Z34.00          

                          ENCNTR FOR SUPRVSN OF NORMAL FIRST PREGN              

      

 

                10/30/2017           CLAUDY MCCOLLUM MD           Ot           

   Z34.82          

                          ENCOUNTER FOR SUPRVSN OF NORMAL PREGNANC              

      

 

             10/30/2017           CLAUDY MCCOLLUM MD           Ot           Z36

           

ENCOUNTER FOR  SCREENING OF MOT                    

 

                10/30/2017           CLAUDY MCCOLLUM MD           Ot           

   Z3A.22          

                          22 WEEKS GESTATION OF PREGNANCY                    

 

                10/30/2017           CLAUDY MCCOLLUM MD, Ot           

   O46.93          

                          ANTEPARTUM HEMORRHAGE, UNSPECIFIED, THIR              

      

 

                10/30/2017           CLAUDY MCCOLLUM MD, Ot           

   Z3A.00          

                          WEEKS OF GESTATION OF PREGNANCY NOT SPEC              

      

 

             10/30/2017           CLAUDY MCCOLLUM MD           Ot           O28

.0           

ABNORMAL HEMATOLOG FINDING ON                      

 

             10/30/2017           CLAUDY MCCOLLUM MD           Ot           O28

.0           

ABNORMAL HEMATOLOG FINDING ON                      

 

                10/30/2017           CLAUDY MCCOLLUM MD           Ot           

   Z3A.34          

                          34 WEEKS GESTATION OF PREGNANCY                    

 

             2017           LISBETH KEARNEY APRN           Ot           F31

.9           

BIPOLAR DISORDER, UNSPECIFIED                    

 

             2017           LISBETH KEARNEY APRN           Ot           F41

.9           

ANXIETY DISORDER, UNSPECIFIED                    

 

             2017           LISBETH KEARNEY APRN           Ot           J40

           

BRONCHITIS, NOT SPECIFIED AS ACUTE OR CH                    

 

             2017           LISBETH KEARNEY APRN           Ot           R05

           

COUGH                                            

 

             2017           LISBETH KEARNEY APRN           Ot           Z80

.0           

FAMILY HISTORY OF MALIGNANT NEOPLASM OF                     

 

                2017           LISBETH KEARNEY APRN           Ot           

   Z87.891         

                          PERSONAL HISTORY OF NICOTINE DEPENDENCE               

     

 

             2017           LISBETH KEARNEY APRN           Ot           F31

.9           

BIPOLAR DISORDER, UNSPECIFIED                    

 

             2017           LISBETH KEARNEY APRN           Ot           F41

.9           

ANXIETY DISORDER, UNSPECIFIED                    

 

             2017           LISBETH KEARNEY APRN           Ot           J40

           

BRONCHITIS, NOT SPECIFIED AS ACUTE OR CH                    

 

             2017           LISBETH KEARNEY APRN           Ot           R05

           

COUGH                                            

 

             2017           LISBETH KEARNEY APRN           Ot           Z80

.0           

FAMILY HISTORY OF MALIGNANT NEOPLASM OF                     

 

                2017           LISBETH KEARNEY APRN           Ot           

   Z87.891         

                          PERSONAL HISTORY OF NICOTINE DEPENDENCE               

     

 

             2018           CORNELIUS MOHANIS           Ot           F17.210 

          

NICOTINE DEPENDENCE, CIGARETTES, UNCOMPL                    

 

             2018           CORNELIUS MOHANIS           Ot           F31.9   

        

BIPOLAR DISORDER, UNSPECIFIED                    

 

             2018           BERNCORNELIUS VEEIS           Ot           F41.9   

        

ANXIETY DISORDER, UNSPECIFIED                    

 

             2018           CORNELIUS MOHANIS           Ot           J06.9   

        ACUTE

UPPER RESPIRATORY INFECTION, UNSPE                    

 

             2018           BERNOTCORNELIUSIS           Ot           N39.0   

        

URINARY TRACT INFECTION, SITE NOT SPECIF                    

 

             2018           BERNCORNELIUS VEEIS           Ot           R05     

      COUGH  

                                                 

 

             2018           CORNELIUS MOHANIS           Ot           Z80.0   

        

FAMILY HISTORY OF MALIGNANT NEOPLASM OF                     

 

             2018           BERNOT ERICA           Ot           F17.210 

          

NICOTINE DEPENDENCE, CIGARETTES, UNCOMPL                    

 

             2018           BERNCORNELIUS VEEIS           Ot           F31.9   

        

BIPOLAR DISORDER, UNSPECIFIED                    

 

             2018           BERNCORNELIUS VEEIS           Ot           F41.9   

        

ANXIETY DISORDER, UNSPECIFIED                    

 

             2018           BERNCORNELIUS VEEIS           Ot           J06.9   

        ACUTE

UPPER RESPIRATORY INFECTION, UNSPE                    

 

             2018           BERNOT ERICA           Ot           N39.0   

        

URINARY TRACT INFECTION, SITE NOT SPECIF                    

 

             2018           ERICA MOHAN           Ot           R05     

      COUGH  

                                                 

 

             2018           CORNELIUS MOHANIS           Ot           Z80.0   

        

FAMILY HISTORY OF MALIGNANT NEOPLASM OF                     

 

             2019           CORNELIUS MOHANIS           Ot           F17.210 

          

NICOTINE DEPENDENCE, CIGARETTES, UNCOMPL                    

 

             2019           CORNELIUS MOHANIS           Ot           F31.9   

        

BIPOLAR DISORDER, UNSPECIFIED                    

 

             2019           CORNELIUS MOHANIS           Ot           F41.9   

        

ANXIETY DISORDER, UNSPECIFIED                    

 

             2019           CORNELIUS MOHANIS           Ot           J06.9   

        ACUTE

UPPER RESPIRATORY INFECTION, UNSPE                    

 

             2019           ERICA MOHAN           Ot           N39.0   

        

URINARY TRACT INFECTION, SITE NOT SPECIF                    

 

             2019           ERICA MOHAN           Ot           R05     

      COUGH  

                                                 

 

             2019           ERICA MOHAN           Ot           Z80.0   

        

FAMILY HISTORY OF MALIGNANT NEOPLASM OF                     

 

                2019           BRANDEN FELIZ MD, Ot        

      F31.9        

                          BIPOLAR DISORDER, UNSPECIFIED                    

 

                2019           BRANDEN FELIZ MD, Ot        

      F41.9        

                          ANXIETY DISORDER, UNSPECIFIED                    

 

                2019           BRANDEN FELIZ MD           Ot        

      H92.02       

                          OTALGIA, LEFT EAR                    

 

                2019           BRANDEN FELIZ MD           Ot        

      I10          

                          ESSENTIAL (PRIMARY) HYPERTENSION                    

 

                2019           BRANDEN FELIZ MD, Ot        

      J06.9        

                          ACUTE UPPER RESPIRATORY INFECTION, UNSPE              

      

 

                2019           BRANDEN FELIZ MD           Ot        

      Z80.0        

                          FAMILY HISTORY OF MALIGNANT NEOPLASM OF               

      

 

                2019           BRANDEN FELIZ MD           Ot        

      Z82.49       

                          FAMILY HX OF ISCHEM HEART DIS AND OTH DI              

      

 

                2019           BRANDEN FELIZ MD           Ot        

      Z87.891      

                          PERSONAL HISTORY OF NICOTINE DEPENDENCE               

     

 

             2019           CLAUDY MCCOLLUM MD           Ot           O28

.0           

ABNORMAL HEMATOLOG FINDING ON                      

 

                2019           CLAUDY MCCOLLUM MD           Ot           

   Z3A.34          

                          34 WEEKS GESTATION OF PREGNANCY                    

 

                2019           JEANETTE GONZALEZ MD           Ot            

  F17.210          

                          NICOTINE DEPENDENCE, CIGARETTES, UNCOMPL              

      

 

             2019           JEANETTE GONZALEZ MD           Ot           F31.

9           

BIPOLAR DISORDER, UNSPECIFIED                    

 

             2019           JEANETTE GONZALEZ MD           Ot           F41.

9           

ANXIETY DISORDER, UNSPECIFIED                    

 

             2019           JEANETTE GONZALEZ MD           Ot           I10 

          

ESSENTIAL (PRIMARY) HYPERTENSION                    

 

             2019           JEANETTE GONZALEZ MD, Ot           J40 

          

BRONCHITIS, NOT SPECIFIED AS ACUTE OR CH                    

 

             2019           JEANETTE GONZALEZ MD           Ot           R05 

          

COUGH                                            

 

             2019           JEANETTE GONZALEZ MD, Ot           Z80.

0           

FAMILY HISTORY OF MALIGNANT NEOPLASM OF                     

 

             2019           JEANETTE GONZALEZ MD, Ot           Z82.

49           

FAMILY HX OF ISCHEM HEART DIS AND OTH DI                    

 

             2019           JEANETTE GONZALEZ MD, Ot           Z88.

8           

ALLERGY STATUS TO OTH DRUG/MEDS/BIOL SUB                    



                                                                                
                                                                                
                                                                                
                                                                                
                                                                                
                                               



Procedures

      



                Code            Description           Performed By           Per

formed On        

 

                                      59237                                 URIN

E PREGNANCY TEST (IN-

HOUSE)                                               2013        

 

                                      67173                                 UA W

/ CULTURE IF INDICATED    

                                                    2013        

 

                                      [id number]                                 

HCG QUALITATIVE W/ REFLEX 

                                                    2013        

 

                                      26743                                 CULT

URE UROGENITAL            

                                                    10/02/2013        

 

                                      89923                                 GC/C

[facility] PROBE ([facility])        

                                                    10/02/2013        

 

                                [id number]                                 PAP SMEAR 

                    

                                        10/02/2013        

 

                                                                       PAP 

SMEAR OBTAIN SMEAR        

                                                    10/02/2013        

 

                                      34149                                 TRIC

[facility] (IN-HOUSE)        

                                                    10/02/2013        

 

                                      [id number]                                 RE

PAIR PERINEUM SKIN, 

EXTERNAL APPROACH                                               2016      

  

 

                                      [id number]                                 DE

LIVERY OF PRODUCTS OF 

CONCEPTION, EXTE                                               2016       

 



                                    



Results

      



                    Test                Result              Range        

 

                                        CBC With Differential/Platelet - 10/04/1

6 10:00         

 

                    WBC                 10.0 x10E3/uL           3.4-10.8        

 

                    RBC                 4.26 x10E6/uL           3.77-5.28       

 

 

                    Hemoglobin           13.2 g/dL           11.1-15.9        

 

                    Hematocrit           38.1 %              34.0-46.6        

 

                    MCV                 89 fL               79-97        

 

                    MCH                 31.0 pg             26.6-33.0        

 

                    MCHC                34.6 g/dL           31.5-35.7        

 

                    RDW                 13.3 %              12.3-15.4        

 

                    Platelets           175 x10E3/uL           150-379        

 

                    Neutrophils           72 %                         

 

                    Lymphs              18 %                         

 

                    Monocytes           8 %                          

 

                    Eos                 2 %                          

 

                    Basos               0 %                          

 

                    Neutrophils (Absolute)           7.2 x10E3/uL           1.4-

7.0        

 

                    Lymphs (Absolute)           1.8 x10E3/uL           0.7-3.1  

      

 

                    Monocytes(Absolute)           0.8 x10E3/uL           0.1-0.9

        

 

                    Eos (Absolute)           0.2 x10E3/uL           0.0-0.4     

   

 

                    Baso (Absolute)           0.0 x10E3/uL           0.0-0.2    

    

 

                    Immature Granulocytes           0 %                         

 

 

                    Immature Grans (Abs)           0.0 x10E3/uL           0.0-0.

1        

 

                                        Comp. Metabolic Panel (14) - 10/04/16 10

:00         

 

                    Glucose, Serum           100 mg/dL           65-99        

 

                    BUN                 6 mg/dL             6-20        

 

                    Creatinine, Serum           0.55 mg/dL           0.57-1.00  

      

 

                    eGFR If NonAfricn Am           131 mL/min/1.73              

 >59        

 

                    eGFR If Africn Am           151 mL/min/1.73               >5

9        

 

                    BUN/Creatinine Ratio           11                  8-20     

   

 

                    Sodium, Serum           140 mmol/L           134-144        

 

                    Potassium, Serum           4.0 mmol/L           3.5-5.2     

   

 

                    Chloride, Serum           105 mmol/L                  

 

 

                    Carbon Dioxide, Total           19 mmol/L           18-29   

     

 

                    Calcium, Serum           9.2 mg/dL           8.7-10.2       

 

 

                    Protein, Total, Serum           6.1 g/dL            6.0-8.5 

       

 

                    Albumin, Serum           3.6 g/dL            3.5-5.5        

 

                    Globulin, Total           2.5 g/dL            1.5-4.5       

 

 

                    A/G Ratio           1.4                 1.1-2.5        

 

                    Bilirubin, Total           0.2 mg/dL           0.0-1.2      

  

 

                    Alkaline Phosphatase, S           99 IU/L             

        

 

                    AST (SGOT)           21 IU/L             0-40        

 

                    ALT (SGPT)           28 IU/L             0-32        

 

                                        Prot+CreatU (Random) - 10/04/16 10:00   

      

 

                    Creatinine, Urine           58.8 mg/dL           Not Estab. 

       

 

                    Protein,Total,Urine           10.0 mg/dL           Not Estab

.        

 

                    Protein/Creat Ratio           170 mg/g creat           0-200

        

 

                                        Uric Acid, Serum - 10/04/16 10:00       

  

 

                    Uric Acid, Serum           5.0 mg/dL           2.5-7.1      

  

 

                                        LDH - 10/04/16 10:00         

 

                    LDH                 152 IU/L            119-226        

 

                                        CBC With Differential/Platelet - 10/07/1

6 15:38         

 

                    WBC                 9.9 x10E3/uL           3.4-10.8        

 

                    RBC                 4.09 x10E6/uL           3.77-5.28       

 

 

                    Hemoglobin           12.8 g/dL           11.1-15.9        

 

                    Hematocrit           37.7 %              34.0-46.6        

 

                    MCV                 92 fL               79-97        

 

                    MCH                 31.3 pg             26.6-33.0        

 

                    MCHC                34.0 g/dL           31.5-35.7        

 

                    RDW                 13.4 %              12.3-15.4        

 

                    Platelets           185 x10E3/uL           150-379        

 

                    Neutrophils           71 %                         

 

                    Lymphs              21 %                         

 

                    Monocytes           6 %                          

 

                    Eos                 2 %                          

 

                    Basos               0 %                          

 

                    Neutrophils (Absolute)           7.0 x10E3/uL           1.4-

7.0        

 

                    Lymphs (Absolute)           2.1 x10E3/uL           0.7-3.1  

      

 

                    Monocytes(Absolute)           0.6 x10E3/uL           0.1-0.9

        

 

                    Eos (Absolute)           0.2 x10E3/uL           0.0-0.4     

   

 

                    Baso (Absolute)           0.0 x10E3/uL           0.0-0.2    

    

 

                    Immature Granulocytes           0 %                         

 

 

                    Immature Grans (Abs)           0.0 x10E3/uL           0.0-0.

1        

 

                                        Gest. Diabetes 1-Hr Screen - 10/07/16 15

:38         

 

                    Gestational Diabetes Screen           119 mg/dL           65

-139        

 

                                        CBC With Differential/Platelet - 10/12/1

6 10:33         

 

                    WBC                 11.9 x10E3/uL           3.4-10.8        

 

                    RBC                 4.03 x10E6/uL           3.77-5.28       

 

 

                    Hemoglobin           12.5 g/dL           11.1-15.9        

 

                    Hematocrit           37.1 %              34.0-46.6        

 

                    MCV                 92 fL               79-97        

 

                    MCH                 31.0 pg             26.6-33.0        

 

                    MCHC                33.7 g/dL           31.5-35.7        

 

                    RDW                 13.4 %              12.3-15.4        

 

                    Platelets           177 x10E3/uL           150-379        

 

                    Neutrophils           75 %                         

 

                    Lymphs              17 %                         

 

                    Monocytes           6 %                          

 

                    Eos                 2 %                          

 

                    Basos               0 %                          

 

                    Neutrophils (Absolute)           8.9 x10E3/uL           1.4-

7.0        

 

                    Lymphs (Absolute)           2.0 x10E3/uL           0.7-3.1  

      

 

                    Monocytes(Absolute)           0.8 x10E3/uL           0.1-0.9

        

 

                    Eos (Absolute)           0.2 x10E3/uL           0.0-0.4     

   

 

                    Baso (Absolute)           0.0 x10E3/uL           0.0-0.2    

    

 

                    Immature Granulocytes           0 %                         

 

 

                    Immature Grans (Abs)           0.0 x10E3/uL           0.0-0.

1        

 

                                        Comp. Metabolic Panel (14) - 10/12/16 10

:33         

 

                    Glucose, Serum           83 mg/dL            65-99        

 

                    BUN                 4 mg/dL             6-20        

 

                    Creatinine, Serum           0.60 mg/dL           0.57-1.00  

      

 

                    eGFR If NonAfricn Am           126 mL/min/1.73              

 >59        

 

                    eGFR If Africn Am           145 mL/min/1.73               >5

9        

 

                    BUN/Creatinine Ratio           7                   8-20     

   

 

                    Sodium, Serum           142 mmol/L           134-144        

 

                    Potassium, Serum           4.1 mmol/L           3.5-5.2     

   

 

                    Chloride, Serum           105 mmol/L                  

 

 

                    Carbon Dioxide, Total           20 mmol/L           18-29   

     

 

                    Calcium, Serum           9.1 mg/dL           8.7-10.2       

 

 

                    Protein, Total, Serum           5.7 g/dL            6.0-8.5 

       

 

                    Albumin, Serum           3.4 g/dL            3.5-5.5        

 

                    Globulin, Total           2.3 g/dL            1.5-4.5       

 

 

                    A/G Ratio           1.5                 1.1-2.5        

 

                    Bilirubin, Total           0.2 mg/dL           0.0-1.2      

  

 

                    Alkaline Phosphatase, S           101 IU/L            

        

 

                    AST (SGOT)           14 IU/L             0-40        

 

                    ALT (SGPT)           22 IU/L             0-32        

 

                                        Prot+CreatU (Random) - 10/12/16 10:33   

      

 

                    Creatinine, Urine           114.6 mg/dL           Not Estab.

        

 

                    Protein,Total,Urine           23.6 mg/dL           Not Estab

.        

 

                    Protein/Creat Ratio           206 mg/g creat           0-200

        

 

                                        Uric Acid, Serum - 10/12/16 10:33       

  

 

                    Uric Acid, Serum           5.2 mg/dL           2.5-7.1      

  

 

                                        LDH - 10/12/16 10:33         

 

                    LDH                 155 IU/L            119-226        

 

                                        Urine Culture, Routine - 10/12/16 10:33 

        

 

                    Urine Culture, Routine           Note                       

  

 

                                        Urine Culture, Routine - 10/18/16 15:19 

        

 

                    Urine Culture, Routine           Note                       

  

 

                                        Genital Culture, Routine - 10/18/16 15:1

9         

 

                    Genital Culture, Routine           Note                     

    

 

                                        Complete blood count (CBC) with automate

d white blood cell (WBC) differential - 

16 11:14         

 

                          Blood leukocytes automated count (number/volume)      

     14.3 10*3/uL         

                                        4.3-11.0        

 

                          Blood erythrocytes automated count (number/volume)    

       4.22 10*6/uL       

                                        4.35-5.85        

 

                    Venous blood hemoglobin measurement (mass/volume)           

13.4 g/dL           

11.5-16.0        

 

                    Blood hematocrit (volume fraction)           38 %           

     35-52        

 

                    Automated erythrocyte mean corpuscular volume           90 [

foz_us]           

80-99        

 

                                        Automated erythrocyte mean corpuscular h

emoglobin (mass per erythrocyte)        

                          32 pg                     25-34        

 

                                        Automated erythrocyte mean corpuscular h

emoglobin concentration measurement 

(mass/volume)             35 g/dL                   32-36        

 

                    Automated erythrocyte distribution width ratio           12.

9 %              10.0-

14.5        

 

                    Automated blood platelet count (count/volume)           188 

10*3/uL           

130-400        

 

                          Automated blood platelet mean volume measurement      

     10.4 [foz_us]        

                                        7.4-10.4        

 

                    Automated blood neutrophils/100 leukocytes           72 %   

             42-75       

 

 

                    Automated blood lymphocytes/100 leukocytes           20 %   

             12-44       

 

 

                    Blood monocytes/100 leukocytes           7 %                

 0-12        

 

                    Automated blood eosinophils/100 leukocytes           1 %    

             0-10        

 

                    Automated blood basophils/100 leukocytes           0 %      

           0-10        

 

                    Blood neutrophils automated count (number/volume)           

10.3 10*3           

1.8-7.8        

 

                    Blood lymphocytes automated count (number/volume)           

2.8 10*3            

1.0-4.0        

 

                    Blood monocytes automated count (number/volume)           1.

0 10*3            

0.0-1.0        

 

                    Automated eosinophil count           0.2 10*3/uL           0

.0-0.3        

 

                    Automated blood basophil count (count/volume)           0.0 

10*3/uL           

0.0-0.1        

 

                                        Blood manual differential performed dete

ction - 16 11:14         

 

                    Blood monocytes/100 leukocytes           3 %                

 NRG        

 

                    Manual blood segmented neutrophils/100 leukocytes           

71 %                NRG  

      

 

                    Manual blood lymphocytes/100 leukocytes           23 %      

          NRG        

 

                    Manual eosinophils/100 leukocytes in nose           2 %     

            NRG        

 

                    Manual blood basophils/100 leukocytes           1 %         

        NRG        

 

                    Blood erythrocyte morphology finding identification         

  NORMAL              

NRG        

 

                                        Serum or plasma choriogonadotropin measu

rement (units/volume) - 16 11:14  

       

 

                          Serum or plasma choriogonadotropin measurement (units/

volume)           38311 

m[iU]/mL                                <5        

 

                                        Complete urinalysis with reflex to cultu

re - 16 12:13         

 

                    Urine color determination           LYUBOV               NRG 

       

 

                    Urine clarity determination           CLEAR               NR

G        

 

                    Urine pH measurement by test strip           7              

     5-9        

 

                    Specific gravity of urine by test strip           1.015     

          1.016-1.022  

      

 

                    Urine protein assay by test strip, semi-quantitative        

   1+                  

NEGATIVE        

 

                    Urine glucose detection by automated test strip           NE

GATIVE            

NEGATIVE        

 

                          Erythrocytes detection in urine sediment by light micr

oscopy           NEGATIVE 

                                        NEGATIVE        

 

                    Urine ketones detection by automated test strip           2+

                  NEGATIVE

        

 

                    Urine nitrite detection by test strip           NEGATIVE    

        NEGATIVE    

    

 

                    Urine total bilirubin detection by test strip           NEGA

TIVE            

NEGATIVE        

 

                          Urine urobilinogen measurement by automated test strip

 (mass/volume)           1

 mg/dL                                  NORMAL        

 

                    Urine leukocyte esterase detection by dipstick           3+ 

                 NEGATIVE 

       

 

                                        Automated urine sediment erythrocyte cou

nt by microscopy (number/high power 

field)                    NONE                      NRG        

 

                                        Automated urine sediment leukocyte count

 by microscopy (number/high power field)

                           [HPF]                    NRG        

 

                          Bacteria detection in urine sediment by light microsco

py           MODERATE     

                                        NRG        

 

                                        Squamous epithelial cells detection in u

rine sediment by light microscopy       

                          >50                       NRG        

 

                          Crystals detection in urine sediment by light microsco

py           NONE         

                                        NRG        

 

                    Casts detection in urine sediment by light microscopy       

    NONE                

NRG        

 

                          Mucus detection in urine sediment by light microscopy 

          NEGATIVE        

                                        NRG        

 

                    Complete urinalysis with reflex to culture           YES    

             NRG        

 

                                        Bacterial urine culture - 16 12:13

         

 

                    URINE CULTURE RESULTS           <10,000/ML            NRG   

     

 

                                        CBC With Differential/Platelet - 

6 13:59         

 

                    WBC                 11.3 x10E3/uL           3.4-10.8        

 

                    RBC                 4.24 x10E6/uL           3.77-5.28       

 

 

                    Hemoglobin           13.2 g/dL           11.1-15.9        

 

                    Hematocrit           39.5 %              34.0-46.6        

 

                    MCV                 93 fL               79-97        

 

                    MCH                 31.1 pg             26.6-33.0        

 

                    MCHC                33.4 g/dL           31.5-35.7        

 

                    RDW                 13.1 %              12.3-15.4        

 

                    Platelets           215 x10E3/uL           150-379        

 

                    Neutrophils           68 %                         

 

                    Lymphs              23 %                         

 

                    Monocytes           7 %                          

 

                    Eos                 2 %                          

 

                    Basos               0 %                          

 

                    Neutrophils (Absolute)           7.6 x10E3/uL           1.4-

7.0        

 

                    Lymphs (Absolute)           2.6 x10E3/uL           0.7-3.1  

      

 

                    Monocytes(Absolute)           0.8 x10E3/uL           0.1-0.9

        

 

                    Eos (Absolute)           0.3 x10E3/uL           0.0-0.4     

   

 

                    Baso (Absolute)           0.0 x10E3/uL           0.0-0.2    

    

 

                    Immature Granulocytes           0 %                         

 

 

                    Immature Grans (Abs)           0.0 x10E3/uL           0.0-0.

1        

 

                                        Comp. Metabolic Panel (14) - 16 13

:59         

 

                    Glucose, Serum           85 mg/dL            65-99        

 

                    BUN                 7 mg/dL             6-20        

 

                    Creatinine, Serum           0.58 mg/dL           0.57-1.00  

      

 

                    eGFR If NonAfricn Am           128 mL/min/1.73              

 >59        

 

                    eGFR If Africn Am           147 mL/min/1.73               >5

9        

 

                    BUN/Creatinine Ratio           12                  8-20     

   

 

                    Sodium, Serum           140 mmol/L           136-144        

 

                    Potassium, Serum           4.3 mmol/L           3.5-5.2     

   

 

                    Chloride, Serum           103 mmol/L                  

 

 

                    Carbon Dioxide, Total           22 mmol/L           18-29   

     

 

                    Calcium, Serum           9.6 mg/dL           8.7-10.2       

 

 

                    Protein, Total, Serum           6.0 g/dL            6.0-8.5 

       

 

                    Albumin, Serum           2.9 g/dL            3.5-5.5        

 

                    Globulin, Total           3.1 g/dL            1.5-4.5       

 

 

                    A/G Ratio           0.9                 1.1-2.5        

 

                    Bilirubin, Total           0.2 mg/dL           0.0-1.2      

  

 

                    Alkaline Phosphatase, S           140 IU/L            

        

 

                    AST (SGOT)           15 IU/L             0-40        

 

                    ALT (SGPT)           16 IU/L             0-32        

 

                                        Prot+CreatU (Random) - 16 13:59   

      

 

                    Creatinine, Urine           170.5 mg/dL           Not Estab.

        

 

                    Protein,Total,Urine           33.8 mg/dL           Not Estab

.        

 

                    Protein/Creat Ratio           198 mg/g creat           0-200

        

 

                                        Uric Acid, Serum - 16 13:59       

  

 

                    Uric Acid, Serum           5.0 mg/dL           2.5-7.1      

  

 

                                        LDH - 16 13:59         

 

                    LDH                 160 IU/L            119-226        

 

                                        Prot+CreatU (Random) - 16 16:15   

      

 

                    Creatinine, Urine           183.4 mg/dL           Not Estab.

        

 

                    Protein,Total,Urine           32.9 mg/dL           Not Estab

.        

 

                    Protein/Creat Ratio           179 mg/g creat           0-200

        

 

                                        CBC With Differential/Platelet - 

6 16:15         

 

                    WBC                 12.3 x10E3/uL           3.4-10.8        

 

                    RBC                 4.16 x10E6/uL           3.77-5.28       

 

 

                    Hemoglobin           13.0 g/dL           11.1-15.9        

 

                    Hematocrit           39.0 %              34.0-46.6        

 

                    MCV                 94 fL               79-97        

 

                    MCH                 31.3 pg             26.6-33.0        

 

                    MCHC                33.3 g/dL           31.5-35.7        

 

                    RDW                 13.3 %              12.3-15.4        

 

                    Platelets           199 x10E3/uL           150-379        

 

                    Neutrophils           66 %                         

 

                    Lymphs              24 %                         

 

                    Monocytes           8 %                          

 

                    Eos                 2 %                          

 

                    Basos               0 %                          

 

                    Neutrophils (Absolute)           8.1 x10E3/uL           1.4-

7.0        

 

                    Lymphs (Absolute)           2.9 x10E3/uL           0.7-3.1  

      

 

                    Monocytes(Absolute)           1.0 x10E3/uL           0.1-0.9

        

 

                    Eos (Absolute)           0.2 x10E3/uL           0.0-0.4     

   

 

                    Baso (Absolute)           0.0 x10E3/uL           0.0-0.2    

    

 

                    Immature Granulocytes           0 %                         

 

 

                    Immature Grans (Abs)           0.0 x10E3/uL           0.0-0.

1        

 

                                        Comp. Metabolic Panel (14) - 16 16

:15         

 

                    Glucose, Serum           97 mg/dL            65-99        

 

                    BUN                 8 mg/dL             6-20        

 

                    Creatinine, Serum           0.58 mg/dL           0.57-1.00  

      

 

                    eGFR If NonAfricn Am           128 mL/min/1.73              

 >59        

 

                    eGFR If Africn Am           147 mL/min/1.73               >5

9        

 

                    BUN/Creatinine Ratio           14                  8-20     

   

 

                    Sodium, Serum           141 mmol/L           136-144        

 

                    Potassium, Serum           4.6 mmol/L           3.5-5.2     

   

 

                    Chloride, Serum           104 mmol/L                  

 

 

                    Carbon Dioxide, Total           20 mmol/L           18-29   

     

 

                    Calcium, Serum           9.3 mg/dL           8.7-10.2       

 

 

                    Protein, Total, Serum           5.9 g/dL            6.0-8.5 

       

 

                    Albumin, Serum           3.3 g/dL            3.5-5.5        

 

                    Globulin, Total           2.6 g/dL            1.5-4.5       

 

 

                    A/G Ratio           1.3                 1.1-2.5        

 

                    Bilirubin, Total           0.2 mg/dL           0.0-1.2      

  

 

                    Alkaline Phosphatase, S           167 IU/L            

        

 

                    AST (SGOT)           18 IU/L             0-40        

 

                    ALT (SGPT)           16 IU/L             0-32        

 

                                        Uric Acid, Serum - 16 16:15       

  

 

                    Uric Acid, Serum           5.3 mg/dL           2.5-7.1      

  

 

                                        LDH - 16 16:15         

 

                    LDH                 159 IU/L            119-226        

 

                                        CBC With Differential/Platelet - 

6 15:27         

 

                    WBC                 12.8 x10E3/uL           3.4-10.8        

 

                    RBC                 4.57 x10E6/uL           3.77-5.28       

 

 

                    Hemoglobin           14.2 g/dL           11.1-15.9        

 

                    Hematocrit           42.5 %              34.0-46.6        

 

                    MCV                 93 fL               79-97        

 

                    MCH                 31.1 pg             26.6-33.0        

 

                    MCHC                33.4 g/dL           31.5-35.7        

 

                    RDW                 12.9 %              12.3-15.4        

 

                    Platelets           215 x10E3/uL           150-379        

 

                    Neutrophils           69 %                         

 

                    Lymphs              22 %                         

 

                    Monocytes           7 %                          

 

                    Eos                 2 %                          

 

                    Basos               0 %                          

 

                    Neutrophils (Absolute)           8.8 x10E3/uL           1.4-

7.0        

 

                    Lymphs (Absolute)           2.9 x10E3/uL           0.7-3.1  

      

 

                    Monocytes(Absolute)           0.9 x10E3/uL           0.1-0.9

        

 

                    Eos (Absolute)           0.2 x10E3/uL           0.0-0.4     

   

 

                    Baso (Absolute)           0.0 x10E3/uL           0.0-0.2    

    

 

                    Immature Granulocytes           0 %                         

 

 

                    Immature Grans (Abs)           0.0 x10E3/uL           0.0-0.

1        

 

                                        Comp. Metabolic Panel (14) - 16 15

:27         

 

                    Glucose, Serum           117 mg/dL           65-99        

 

                    BUN                 8 mg/dL             6-20        

 

                    Creatinine, Serum           0.55 mg/dL           0.57-1.00  

      

 

                    eGFR If NonAfricn Am           130 mL/min/1.73              

 >59        

 

                    eGFR If Africn Am           150 mL/min/1.73               >5

9        

 

                    BUN/Creatinine Ratio           15                  8-20     

   

 

                    Sodium, Serum           139 mmol/L           136-144        

 

                    Potassium, Serum           4.2 mmol/L           3.5-5.2     

   

 

                    Chloride, Serum           100 mmol/L                  

 

 

                    Carbon Dioxide, Total           23 mmol/L           18-29   

     

 

                    Calcium, Serum           9.5 mg/dL           8.7-10.2       

 

 

                    Protein, Total, Serum           6.5 g/dL            6.0-8.5 

       

 

                    Albumin, Serum           3.8 g/dL            3.5-5.5        

 

                    Globulin, Total           2.7 g/dL            1.5-4.5       

 

 

                    A/G Ratio           1.4                 1.1-2.5        

 

                    Bilirubin, Total           0.3 mg/dL           0.0-1.2      

  

 

                    Alkaline Phosphatase, S           207 IU/L            

        

 

                    AST (SGOT)           15 IU/L             0-40        

 

                    ALT (SGPT)           15 IU/L             0-32        

 

                                        Prot+CreatU (Random) - 16 15:27   

      

 

                    Creatinine, Urine           124.7 mg/dL           Not Estab.

        

 

                    Protein,Total,Urine           27.8 mg/dL           Not Estab

.        

 

                    Protein/Creat Ratio           223 mg/g creat           0-200

        

 

                                        Uric Acid, Serum - 16 15:27       

  

 

                    Uric Acid, Serum           5.8 mg/dL           2.5-7.1      

  

 

                                        LDH - 16 15:27         

 

                    LDH                 184 IU/L            119-226        

 

                                        Complete urinalysis with reflex to cultu

re - 16 21:30         

 

                    Urine color determination           YELLOW              NRG 

       

 

                    Urine clarity determination           SLIGHTLY CLOUDY       

     NRG        

 

                    Urine pH measurement by test strip           6              

     5-9        

 

                    Specific gravity of urine by test strip           1.025     

          1.016-1.022  

      

 

                    Urine protein assay by test strip, semi-quantitative        

   2+                  

NEGATIVE        

 

                    Urine glucose detection by automated test strip           NE

GATIVE            

NEGATIVE        

 

                          Erythrocytes detection in urine sediment by light micr

oscopy           1+       

                                        NEGATIVE        

 

                    Urine ketones detection by automated test strip           1+

                  NEGATIVE

        

 

                    Urine nitrite detection by test strip           NEGATIVE    

        NEGATIVE    

    

 

                    Urine total bilirubin detection by test strip           NEGA

TIVE            

NEGATIVE        

 

                          Urine urobilinogen measurement by automated test strip

 (mass/volume)           1

 mg/dL                                  NORMAL        

 

                    Urine leukocyte esterase detection by dipstick           3+ 

                 NEGATIVE 

       

 

                                        Automated urine sediment erythrocyte cou

nt by microscopy (number/high power 

field)                     [HPF]                    NRG        

 

                                        Automated urine sediment leukocyte count

 by microscopy (number/high power field)

                          > [HPF]                   NRG        

 

                          Bacteria detection in urine sediment by light microsco

py           MODERATE     

                                        NRG        

 

                                        Squamous epithelial cells detection in u

rine sediment by light microscopy       

                          5-10                      NRG        

 

                          Crystals detection in urine sediment by light microsco

py           NONE         

                                        NRG        

 

                    Casts detection in urine sediment by light microscopy       

    NONE                

NRG        

 

                          Mucus detection in urine sediment by light microscopy 

          NEGATIVE        

                                        NRG        

 

                    Complete urinalysis with reflex to culture           YES    

             NRG        

 

                          Urine Trichomonas species detection by light microscop

y           FEW           

                                        NRG        

 

                                        Bacterial urine culture - 16 21:30

         

 

                    URINE CULTURE RESULTS           <10,000/ML            NRG   

     

 

                                        Strep Gp B Culture - 16 15:11     

    

 

                    Strep Gp B Culture           Negative            Negative   

     

 

                                        CBC With Differential/Platelet - 

6 15:11         

 

                    WBC                 13.2 x10E3/uL           3.4-10.8        

 

                    RBC                 4.47 x10E6/uL           3.77-5.28       

 

 

                    Hemoglobin           13.9 g/dL           11.1-15.9        

 

                    Hematocrit           41.1 %              34.0-46.6        

 

                    MCV                 92 fL               79-97        

 

                    MCH                 31.1 pg             26.6-33.0        

 

                    MCHC                33.8 g/dL           31.5-35.7        

 

                    RDW                 12.8 %              12.3-15.4        

 

                    Platelets           214 x10E3/uL           150-379        

 

                    Neutrophils           67 %                         

 

                    Lymphs              24 %                         

 

                    Monocytes           8 %                          

 

                    Eos                 1 %                          

 

                    Basos               0 %                          

 

                    Neutrophils (Absolute)           8.7 x10E3/uL           1.4-

7.0        

 

                    Lymphs (Absolute)           3.1 x10E3/uL           0.7-3.1  

      

 

                    Monocytes(Absolute)           1.1 x10E3/uL           0.1-0.9

        

 

                    Eos (Absolute)           0.2 x10E3/uL           0.0-0.4     

   

 

                    Baso (Absolute)           0.0 x10E3/uL           0.0-0.2    

    

 

                    Immature Granulocytes           0 %                         

 

 

                    Immature Grans (Abs)           0.0 x10E3/uL           0.0-0.

1        

 

                                        Comp. Metabolic Panel (14) - 16 15

:11         

 

                    Glucose, Serum           85 mg/dL            65-99        

 

                    BUN                 10 mg/dL            6-20        

 

                    Creatinine, Serum           0.56 mg/dL           0.57-1.00  

      

 

                    eGFR If NonAfricn Am           129 mL/min/1.73              

 >59        

 

                    eGFR If Africn Am           149 mL/min/1.73               >5

9        

 

                    BUN/Creatinine Ratio           18                  8-20     

   

 

                    Sodium, Serum           140 mmol/L           136-144        

 

                    Potassium, Serum           4.5 mmol/L           3.5-5.2     

   

 

                    Chloride, Serum           104 mmol/L                  

 

 

                    Carbon Dioxide, Total           21 mmol/L           18-29   

     

 

                    Calcium, Serum           9.3 mg/dL           8.7-10.2       

 

 

                    Protein, Total, Serum           5.9 g/dL            6.0-8.5 

       

 

                    Albumin, Serum           3.4 g/dL            3.5-5.5        

 

                    Globulin, Total           2.5 g/dL            1.5-4.5       

 

 

                    A/G Ratio           1.4                 1.1-2.5        

 

                    Bilirubin, Total           0.3 mg/dL           0.0-1.2      

  

 

                    Alkaline Phosphatase, S           226 IU/L            

        

 

                    AST (SGOT)           20 IU/L             0-40        

 

                    ALT (SGPT)           16 IU/L             0-32        

 

                                        Prot+CreatU (Random) - 16 15:11   

      

 

                    Creatinine, Urine           222.7 mg/dL           Not Estab.

        

 

                    Protein,Total,Urine           32.7 mg/dL           Not Estab

.        

 

                    Protein/Creat Ratio           147 mg/g creat           0-200

        

 

                                        Uric Acid, Serum - 16 15:11       

  

 

                    Uric Acid, Serum           6.4 mg/dL           2.5-7.1      

  

 

                                        LDH - 16 15:11         

 

                    LDH                 184 IU/L            119-226        

 

                                        Streptococcus pyogenes antigen detection

 - 16 08:52         

 

                    Streptococcus pyogenes antigen detection           NEGATIVE 

           NEGATIVE 

       

 

                                        Bacterial throat culture - 16 08:5

2         

 

                    Bacterial throat culture           NBS                 NRG  

      

 

                                        Complete blood count (CBC) with automate

d white blood cell (WBC) differential - 

16 09:00         

 

                          Blood leukocytes automated count (number/volume)      

     11.3 10*3/uL         

                                        4.3-11.0        

 

                          Blood erythrocytes automated count (number/volume)    

       4.21 10*6/uL       

                                        4.35-5.85        

 

                    Venous blood hemoglobin measurement (mass/volume)           

13.1 g/dL           

11.5-16.0        

 

                    Blood hematocrit (volume fraction)           38 %           

     35-52        

 

                    Automated erythrocyte mean corpuscular volume           91 [

foz_us]           

80-99        

 

                                        Automated erythrocyte mean corpuscular h

emoglobin (mass per erythrocyte)        

                          31 pg                     25-34        

 

                                        Automated erythrocyte mean corpuscular h

emoglobin concentration measurement 

(mass/volume)             34 g/dL                   32-36        

 

                    Automated erythrocyte distribution width ratio           12.

6 %              10.0-

14.5        

 

                    Automated blood platelet count (count/volume)           147 

10*3/uL           

130-400        

 

                          Automated blood platelet mean volume measurement      

     11.2 [foz_us]        

                                        7.4-10.4        

 

                    Automated blood neutrophils/100 leukocytes           64 %   

             42-75       

 

 

                    Automated blood lymphocytes/100 leukocytes           25 %   

             12-44       

 

 

                    Blood monocytes/100 leukocytes           9 %                

 0-12        

 

                    Automated blood eosinophils/100 leukocytes           2 %    

             0-10        

 

                    Automated blood basophils/100 leukocytes           0 %      

           0-10        

 

                    Blood neutrophils automated count (number/volume)           

7.3 10*3            

1.8-7.8        

 

                    Blood lymphocytes automated count (number/volume)           

2.9 10*3            

1.0-4.0        

 

                    Blood monocytes automated count (number/volume)           1.

0 10*3            

0.0-1.0        

 

                    Automated eosinophil count           0.2 10*3/uL           0

.0-0.3        

 

                    Automated blood basophil count (count/volume)           0.0 

10*3/uL           

0.0-0.1        

 

                                        Serum heterophile antibody titer -  09:00         

 

                    Serum heterophile antibody titer           NEGATIVE         

   NEGATIVE        

 

                                        CBC With Differential/Platelet - 

6 15:53         

 

                    WBC                 15.4 x10E3/uL           3.4-10.8        

 

                    RBC                 4.19 x10E6/uL           3.77-5.28       

 

 

                    Hemoglobin           12.7 g/dL           11.1-15.9        

 

                    Hematocrit           38.0 %              34.0-46.6        

 

                    MCV                 91 fL               79-97        

 

                    MCH                 30.3 pg             26.6-33.0        

 

                    MCHC                33.4 g/dL           31.5-35.7        

 

                    RDW                 12.3 %              12.3-15.4        

 

                    Platelets           178 x10E3/uL           150-379        

 

                    Neutrophils           61 %                         

 

                    Lymphs              31 %                         

 

                    Monocytes           7 %                          

 

                    Eos                 1 %                          

 

                    Basos               0 %                          

 

                    Neutrophils (Absolute)           9.3 x10E3/uL           1.4-

7.0        

 

                    Lymphs (Absolute)           4.7 x10E3/uL           0.7-3.1  

      

 

                    Monocytes(Absolute)           1.1 x10E3/uL           0.1-0.9

        

 

                    Eos (Absolute)           0.1 x10E3/uL           0.0-0.4     

   

 

                    Baso (Absolute)           0.0 x10E3/uL           0.0-0.2    

    

 

                    Immature Granulocytes           0 %                         

 

 

                    Immature Grans (Abs)           0.0 x10E3/uL           0.0-0.

1        

 

                                        Comp. Metabolic Panel (14) - 16 15

:53         

 

                    Glucose, Serum           98 mg/dL            65-99        

 

                    BUN                 9 mg/dL             6-20        

 

                    Creatinine, Serum           0.60 mg/dL           0.57-1.00  

      

 

                    eGFR If NonAfricn Am           126 mL/min/1.73              

 >59        

 

                    eGFR If Africn Am           145 mL/min/1.73               >5

9        

 

                    BUN/Creatinine Ratio           15                  8-20     

   

 

                    Sodium, Serum           142 mmol/L           134-144        

 

                    Potassium, Serum           3.8 mmol/L           3.5-5.2     

   

 

                    Chloride, Serum           106 mmol/L                  

 

 

                    Carbon Dioxide, Total           19 mmol/L           18-29   

     

 

                    Calcium, Serum           8.9 mg/dL           8.7-10.2       

 

 

                    Protein, Total, Serum           5.9 g/dL            6.0-8.5 

       

 

                    Albumin, Serum           3.5 g/dL            3.5-5.5        

 

                    Globulin, Total           2.4 g/dL            1.5-4.5       

 

 

                    A/G Ratio           1.5                 1.1-2.5        

 

                    Bilirubin, Total           <0.2 mg/dL           0.0-1.2     

   

 

                    Alkaline Phosphatase, S           215 IU/L            

        

 

                    AST (SGOT)           13 IU/L             0-40        

 

                    ALT (SGPT)           11 IU/L             0-32        

 

                                        Prot+CreatU (Random) - 16 15:53   

      

 

                    Creatinine, Urine           218.9 mg/dL           Not Estab.

        

 

                    Protein,Total,Urine           47.9 mg/dL           Not Estab

.        

 

                    Protein/Creat Ratio           219 mg/g creat           0-200

        

 

                                        Uric Acid, Serum - 16 15:53       

  

 

                    Uric Acid, Serum           5.8 mg/dL           2.5-7.1      

  

 

                                        LDH - 16 15:53         

 

                    LDH                 170 IU/L            119-226        

 

                                        Complete urinalysis with reflex to cultu

re - 16 01:20         

 

                    Urine color determination           YELLOW              NRG 

       

 

                    Urine clarity determination           SLIGHTLY CLOUDY       

     NRG        

 

                    Urine pH measurement by test strip           6.5            

     5-9        

 

                    Specific gravity of urine by test strip           1.020     

          1.016-1.022  

      

 

                    Urine protein assay by test strip, semi-quantitative        

   2+                  

NEGATIVE        

 

                    Urine glucose detection by automated test strip           NE

GATIVE            

NEGATIVE        

 

                          Erythrocytes detection in urine sediment by light micr

oscopy           NEGATIVE 

                                        NEGATIVE        

 

                    Urine ketones detection by automated test strip           1+

                  NEGATIVE

        

 

                    Urine nitrite detection by test strip           NEGATIVE    

        NEGATIVE    

    

 

                    Urine total bilirubin detection by test strip           NEGA

TIVE            

NEGATIVE        

 

                          Urine urobilinogen measurement by automated test strip

 (mass/volume)           4

 mg/dL                                  NORMAL        

 

                    Urine leukocyte esterase detection by dipstick           2+ 

                 NEGATIVE 

       

 

                                        Automated urine sediment erythrocyte cou

nt by microscopy (number/high power 

field)                    NONE                      NRG        

 

                                        Automated urine sediment leukocyte count

 by microscopy (number/high power field)

                           [HPF]                    NRG        

 

                          Bacteria detection in urine sediment by light microsco

py           FEW          

                                        NRG        

 

                                        Squamous epithelial cells detection in u

rine sediment by light microscopy       

                          10-25                     NRG        

 

                          Crystals detection in urine sediment by light microsco

py           NONE         

                                        NRG        

 

                    Casts detection in urine sediment by light microscopy       

    NONE                

NRG        

 

                          Mucus detection in urine sediment by light microscopy 

          LARGE           

                                        NRG        

 

                    Complete urinalysis with reflex to culture           NO     

             NRG        

 

                                        Urine protein/creatinine mass ratio - 12

/17/16 01:20         

 

                    Urine protein measurement (mass/volume)           66 mg/dL  

          6-12       

 

 

                    Urine creatinine measurement (mass/volume)           328 mg/

dL            

       

 

                    Urine protein/creatinine mass ratio           0.20          

      NRG        

 

                                        Complete blood count (CBC) with automate

d white blood cell (WBC) differential - 

16 02:20         

 

                          Blood leukocytes automated count (number/volume)      

     13.0 10*3/uL         

                                        4.3-11.0        

 

                          Blood erythrocytes automated count (number/volume)    

       4.13 10*6/uL       

                                        4.35-5.85        

 

                    Venous blood hemoglobin measurement (mass/volume)           

12.8 g/dL           

11.5-16.0        

 

                    Blood hematocrit (volume fraction)           37 %           

     35-52        

 

                    Automated erythrocyte mean corpuscular volume           90 [

foz_us]           

80-99        

 

                                        Automated erythrocyte mean corpuscular h

emoglobin (mass per erythrocyte)        

                          31 pg                     25-34        

 

                                        Automated erythrocyte mean corpuscular h

emoglobin concentration measurement 

(mass/volume)             34 g/dL                   32-36        

 

                    Automated erythrocyte distribution width ratio           12.

6 %              10.0-

14.5        

 

                    Automated blood platelet count (count/volume)           164 

10*3/uL           

130-400        

 

                          Automated blood platelet mean volume measurement      

     11.6 [foz_us]        

                                        7.4-10.4        

 

                    Automated blood neutrophils/100 leukocytes           62 %   

             42-75       

 

 

                    Automated blood lymphocytes/100 leukocytes           28 %   

             12-44       

 

 

                    Blood monocytes/100 leukocytes           9 %                

 0-12        

 

                    Automated blood eosinophils/100 leukocytes           1 %    

             0-10        

 

                    Automated blood basophils/100 leukocytes           0 %      

           0-10        

 

                    Blood neutrophils automated count (number/volume)           

8.1 10*3            

1.8-7.8        

 

                    Blood lymphocytes automated count (number/volume)           

3.6 10*3            

1.0-4.0        

 

                    Blood monocytes automated count (number/volume)           1.

1 10*3            

0.0-1.0        

 

                    Automated eosinophil count           0.2 10*3/uL           0

.0-0.3        

 

                    Automated blood basophil count (count/volume)           0.0 

10*3/uL           

0.0-0.1        

 

                                        Comprehensive metabolic panel - 16

 02:20         

 

                          Serum or plasma sodium measurement (moles/volume)     

      138 mmol/L          

                                        135-145        

 

                          Serum or plasma potassium measurement (moles/volume)  

         4.0 mmol/L       

                                        3.6-5.0        

 

                          Serum or plasma chloride measurement (moles/volume)   

        109 mmol/L        

                                                

 

                    Carbon dioxide           21 mmol/L           21-32        

 

                          Serum or plasma anion gap determination (moles/volume)

           8 mmol/L       

                                        5-14        

 

                          Serum or plasma urea nitrogen measurement (mass/volume

)           10 mg/dL      

                                        7-18        

 

                          Serum or plasma creatinine measurement (mass/volume)  

         0.62 mg/dL       

                                        0.60-1.30        

 

                    Serum or plasma urea nitrogen/creatinine mass ratio         

  16                  NRG 

       

 

                                        Serum or plasma creatinine measurement w

ith calculation of estimated glomerular 

filtration rate           >                         NRG        

 

                    Serum or plasma glucose measurement (mass/volume)           

82 mg/dL            

        

 

                    Serum or plasma calcium measurement (mass/volume)           

8.9 mg/dL           

8.5-10.1        

 

                          Serum or plasma total bilirubin measurement (mass/volu

me)           0.3 mg/dL   

                                        0.1-1.0        

 

                                        Serum or plasma alkaline phosphatase tal

surement (enzymatic activity/volume)    

                          245 U/L                           

 

                                        Serum or plasma aspartate aminotransfera

se measurement (enzymatic 

activity/volume)           17 U/L                    5-34        

 

                                        Serum or plasma alanine aminotransferase

 measurement (enzymatic activity/volume)

                          16 U/L                    0-55        

 

                    Serum or plasma protein measurement (mass/volume)           

5.9 g/dL            

6.4-8.2        

 

                    Serum or plasma albumin measurement (mass/volume)           

3.0 g/dL            

3.2-4.5        

 

                                        Serum or plasma uric acid measurement (m

ass/volume) - 16 02:20         

 

                          Serum or plasma uric acid measurement (mass/volume)   

        6.0 mg/dL         

                                        2.6-7.2        

 

                                        Lactate dehydrogenase 1 [enzymatic activ

ity/volume] in serum or plasma - 

16 02:20         

 

                                        Lactate dehydrogenase 1 [enzymatic activ

ity/volume] in serum or plasma          

                          231 U/L                   125-220        

 

                                        [id number] - 16 02:20         

 

                    [id number]             SPECIMEN AVAILABLE            NRG       

 

 

                                        Urine protein/creatinine mass ratio -  12:50         

 

                    Urine protein measurement (mass/volume)           < mg/dL   

          6-12        

 

                    Urine creatinine measurement (mass/volume)           27 mg/d

L              

     

 

                    Urine protein/creatinine mass ratio           TNP           

      NRG        

 

                                        Complete urinalysis with reflex to cultu

re - 16 12:50         

 

                    Urine color determination           YELLOW              NRG 

       

 

                    Urine clarity determination           CLEAR               NR

G        

 

                    Urine pH measurement by test strip           7              

     5-9        

 

                    Specific gravity of urine by test strip           1.005     

          1.016-1.022  

     

 

                          Urine protein assay by test strip, semi-quantitative  

         NEGATIVE         

                                        NEGATIVE        

 

                    Urine glucose detection by automated test strip           NE

GATIVE            

NEGATIVE        

 

                          Erythrocytes detection in urine sediment by light micr

oscopy           NEGATIVE 

                                        NEGATIVE        

 

                    Urine ketones detection by automated test strip           NE

GATIVE            

NEGATIVE        

 

                    Urine nitrite detection by test strip           NEGATIVE    

        NEGATIVE    

   

 

                    Urine total bilirubin detection by test strip           NEGA

TIVE            

NEGATIVE        

 

                          Urine urobilinogen measurement by automated test strip

 (mass/volume)           

NORMAL                                  NORMAL        

 

                    Urine leukocyte esterase detection by dipstick           2+ 

                 NEGATIVE 

      

 

                                        Automated urine sediment erythrocyte cou

nt by microscopy (number/high power 

field)                    NONE                      NRG        

 

                                        Automated urine sediment leukocyte count

 by microscopy (number/high power field)

                           [HPF]                    NRG        

 

                          Bacteria detection in urine sediment by light microsco

py           TRACE        

                                        NRG        

 

                                        Squamous epithelial cells detection in u

rine sediment by light microscopy       

                          2-5                       NRG        

 

                          Crystals detection in urine sediment by light microsco

py           NONE         

                                        NRG        

 

                    Casts detection in urine sediment by light microscopy       

    NONE                

NRG        

 

                          Mucus detection in urine sediment by light microscopy 

          NEGATIVE        

                                        NRG        

 

                    Complete urinalysis with reflex to culture           NO     

             NRG        

 

                                        Complete blood count (CBC) with automate

d white blood cell (WBC) differential - 

16 13:06         

 

                          Blood leukocytes automated count (number/volume)      

     11.6 10*3/uL         

                                        4.3-11.0        

 

                          Blood erythrocytes automated count (number/volume)    

       4.23 10*6/uL       

                                        4.35-5.85        

 

                    Venous blood hemoglobin measurement (mass/volume)           

13.1 g/dL           

11.5-16.0        

 

                    Blood hematocrit (volume fraction)           38 %           

     35-52        

 

                    Automated erythrocyte mean corpuscular volume           90 [

foz_us]           

80-99        

 

                                        Automated erythrocyte mean corpuscular h

emoglobin (mass per erythrocyte)        

                          31 pg                     25-34        

 

                                        Automated erythrocyte mean corpuscular h

emoglobin concentration measurement 

(mass/volume)             34 g/dL                   32-36        

 

                    Automated erythrocyte distribution width ratio           12.

4 %              10.0-

14.5        

 

                    Automated blood platelet count (count/volume)           145 

10*3/uL           

130-400        

 

                          Automated blood platelet mean volume measurement      

     11.2 [foz_us]        

                                        7.4-10.4        

 

                    Automated blood neutrophils/100 leukocytes           66 %   

             42-75       

 

 

                    Automated blood lymphocytes/100 leukocytes           24 %   

             12-44       

 

 

                    Blood monocytes/100 leukocytes           9 %                

 0-12        

 

                    Automated blood eosinophils/100 leukocytes           1 %    

             0-10        

 

                    Automated blood basophils/100 leukocytes           0 %      

           0-10        

 

                    Blood neutrophils automated count (number/volume)           

7.6 10*3            

1.8-7.8        

 

                    Blood lymphocytes automated count (number/volume)           

2.8 10*3            

1.0-4.0        

 

                    Blood monocytes automated count (number/volume)           1.

0 10*3            

0.0-1.0        

 

                    Automated eosinophil count           0.1 10*3/uL           0

.0-0.3        

 

                    Automated blood basophil count (count/volume)           0.0 

10*3/uL           

0.0-0.1        

 

                                        Comprehensive metabolic panel - 16

 13:06         

 

                          Serum or plasma sodium measurement (moles/volume)     

      137 mmol/L          

                                        135-145        

 

                          Serum or plasma potassium measurement (moles/volume)  

         4.0 mmol/L       

                                        3.6-5.0        

 

                          Serum or plasma chloride measurement (moles/volume)   

        109 mmol/L        

                                                

 

                    Carbon dioxide           20 mmol/L           21-32        

 

                          Serum or plasma anion gap determination (moles/volume)

           8 mmol/L       

                                        5-14        

 

                          Serum or plasma urea nitrogen measurement (mass/volume

)           5 mg/dL       

                                        7-18        

 

                          Serum or plasma creatinine measurement (mass/volume)  

         0.63 mg/dL       

                                        0.60-1.30        

 

                    Serum or plasma urea nitrogen/creatinine mass ratio         

  8                   NRG  

      

 

                                        Serum or plasma creatinine measurement w

ith calculation of estimated glomerular 

filtration rate           >                         NRG        

 

                    Serum or plasma glucose measurement (mass/volume)           

71 mg/dL            

        

 

                    Serum or plasma calcium measurement (mass/volume)           

9.0 mg/dL           

8.5-10.1        

 

                          Serum or plasma total bilirubin measurement (mass/volu

me)           0.5 mg/dL   

                                        0.1-1.0        

 

                                        Serum or plasma alkaline phosphatase tal

surement (enzymatic activity/volume)    

                          251 U/L                           

 

                                        Serum or plasma aspartate aminotransfera

se measurement (enzymatic 

activity/volume)           17 U/L                    5-34        

 

                                        Serum or plasma alanine aminotransferase

 measurement (enzymatic activity/volume)

                          13 U/L                    0-55        

 

                    Serum or plasma protein measurement (mass/volume)           

6.0 g/dL            

6.4-8.2        

 

                    Serum or plasma albumin measurement (mass/volume)           

3.1 g/dL            

3.2-4.5        

 

                                        Serum or plasma uric acid measurement (m

ass/volume) - 16 13:06         

 

                          Serum or plasma uric acid measurement (mass/volume)   

        6.4 mg/dL         

                                        2.6-7.2        

 

                                        Lactate dehydrogenase 1 [enzymatic activ

ity/volume] in serum or plasma - 

16 13:06         

 

                                        Lactate dehydrogenase 1 [enzymatic activ

ity/volume] in serum or plasma          

                          251 U/L                   125-220        

 

                                        Blood type T Indirect antibody screen pa

pradeep - 16 13:06         

 

                    ABO+Rh group           BP                  NRG        

 

                    Transfusion band number           [id number]             NRG   

     

 

                    Blood group antibody screen           NEGATIVE            NR

G        

 

                                        Automated blood complete blood count (he

mogram) panel - 16 08:47         

 

                          Blood leukocytes automated count (number/volume)      

     12.2 10*3/uL         

                                        4.3-11.0        

 

                          Blood erythrocytes automated count (number/volume)    

       4.21 10*6/uL       

                                        4.35-5.85        

 

                    Venous blood hemoglobin measurement (mass/volume)           

13.0 g/dL           

11.5-16.0        

 

                    Blood hematocrit (volume fraction)           38 %           

     35-52        

 

                    Automated erythrocyte mean corpuscular volume           90 [

foz_us]           

80-99        

 

                                        Automated erythrocyte mean corpuscular h

emoglobin (mass per erythrocyte)        

                          31 pg                     25-34        

 

                                        Automated erythrocyte mean corpuscular h

emoglobin concentration measurement 

(mass/volume)             34 g/dL                   32-36        

 

                    Automated erythrocyte distribution width ratio           12.

5 %              10.0-

14.5        

 

                    Automated blood platelet count (count/volume)           143 

10*3/uL           

130-400        

 

                          Automated blood platelet mean volume measurement      

     11.4 [foz_us]        

                                        7.4-10.4        

 

                                        Comprehensive metabolic panel - 16

 08:47         

 

                          Serum or plasma sodium measurement (moles/volume)     

      136 mmol/L          

                                        135-145        

 

                          Serum or plasma potassium measurement (moles/volume)  

         3.6 mmol/L       

                                        3.6-5.0        

 

                          Serum or plasma chloride measurement (moles/volume)   

        108 mmol/L        

                                                

 

                    Carbon dioxide           21 mmol/L           21-32        

 

                          Serum or plasma anion gap determination (moles/volume)

           7 mmol/L       

                                        5-14        

 

                          Serum or plasma urea nitrogen measurement (mass/volume

)           6 mg/dL       

                                        7-18        

 

                          Serum or plasma creatinine measurement (mass/volume)  

         0.58 mg/dL       

                                        0.60-1.30        

 

                    Serum or plasma urea nitrogen/creatinine mass ratio         

  10                  NRG 

       

 

                                        Serum or plasma creatinine measurement w

ith calculation of estimated glomerular 

filtration rate           >                         NRG        

 

                    Serum or plasma glucose measurement (mass/volume)           

93 mg/dL            

        

 

                    Serum or plasma calcium measurement (mass/volume)           

8.6 mg/dL           

8.5-10.1        

 

                          Serum or plasma total bilirubin measurement (mass/volu

me)           0.6 mg/dL   

                                        0.1-1.0        

 

                                        Serum or plasma alkaline phosphatase tal

surement (enzymatic activity/volume)    

                          248 U/L                           

 

                                        Serum or plasma aspartate aminotransfera

se measurement (enzymatic 

activity/volume)           18 U/L                    5-34        

 

                                        Serum or plasma alanine aminotransferase

 measurement (enzymatic activity/volume)

                          14 U/L                    0-55        

 

                    Serum or plasma protein measurement (mass/volume)           

5.6 g/dL            

6.4-8.2        

 

                    Serum or plasma albumin measurement (mass/volume)           

2.9 g/dL            

3.2-4.5        

 

                                        Serum or plasma uric acid measurement (m

ass/volume) - 16 08:47         

 

                          Serum or plasma uric acid measurement (mass/volume)   

        6.2 mg/dL         

                                        2.6-7.2        

 

                                        Lactate dehydrogenase 1 [enzymatic activ

ity/volume] in serum or plasma - 

16 08:47         

 

                                        Lactate dehydrogenase 1 [enzymatic activ

ity/volume] in serum or plasma          

                          216 U/L                   125-220        

 

                                        Complete blood count (CBC) with automate

d white blood cell (WBC) differential - 

16 05:20         

 

                          Blood leukocytes automated count (number/volume)      

     12.1 10*3/uL         

                                        4.3-11.0        

 

                          Blood erythrocytes automated count (number/volume)    

       3.76 10*6/uL       

                                        4.35-5.85        

 

                    Venous blood hemoglobin measurement (mass/volume)           

11.7 g/dL           

11.5-16.0        

 

                    Blood hematocrit (volume fraction)           34 %           

     35-52        

 

                    Automated erythrocyte mean corpuscular volume           90 [

foz_us]           

80-99        

 

                                        Automated erythrocyte mean corpuscular h

emoglobin (mass per erythrocyte)        

                          31 pg                     25-34        

 

                                        Automated erythrocyte mean corpuscular h

emoglobin concentration measurement 

(mass/volume)             35 g/dL                   32-36        

 

                    Automated erythrocyte distribution width ratio           12.

4 %              10.0-

14.5        

 

                    Automated blood platelet count (count/volume)           129 

10*3/uL           

130-400        

 

                          Automated blood platelet mean volume measurement      

     11.8 [foz_us]        

                                        7.4-10.4        

 

                    Automated blood neutrophils/100 leukocytes           74 %   

             42-75       

 

 

                    Automated blood lymphocytes/100 leukocytes           17 %   

             12-44       

 

 

                    Blood monocytes/100 leukocytes           8 %                

 0-12        

 

                    Automated blood eosinophils/100 leukocytes           1 %    

             0-10        

 

                    Automated blood basophils/100 leukocytes           0 %      

           0-10        

 

                    Blood neutrophils automated count (number/volume)           

8.9 10*3            

1.8-7.8        

 

                    Blood lymphocytes automated count (number/volume)           

2.1 10*3            

1.0-4.0        

 

                    Blood monocytes automated count (number/volume)           1.

0 10*3            

0.0-1.0        

 

                    Automated eosinophil count           0.1 10*3/uL           0

.0-0.3        

 

                    Automated blood basophil count (count/volume)           0.0 

10*3/uL           

0.0-0.1        

 

                                        Complete blood count (CBC) with automate

d white blood cell (WBC) differential - 

18 11:20         

 

                          Blood leukocytes automated count (number/volume)      

     10.9 10*3/uL         

                                        4.3-11.0        

 

                          Blood erythrocytes automated count (number/volume)    

       5.43 10*6/uL       

                                        4.35-5.85        

 

                    Venous blood hemoglobin measurement (mass/volume)           

16.8 g/dL           

11.5-16.0        

 

                    Blood hematocrit (volume fraction)           48 %           

     35-52        

 

                    Automated erythrocyte mean corpuscular volume           88 [

foz_us]           

80-99        

 

                                        Automated erythrocyte mean corpuscular h

emoglobin (mass per erythrocyte)        

                          31 pg                     25-34        

 

                                        Automated erythrocyte mean corpuscular h

emoglobin concentration measurement 

(mass/volume)             35 g/dL                   32-36        

 

                    Automated erythrocyte distribution width ratio           13.

1 %              10.0-

14.5        

 

                    Automated blood platelet count (count/volume)           200 

10*3/uL           

130-400        

 

                          Automated blood platelet mean volume measurement      

     11.0 [foz_us]        

                                        7.4-10.4        

 

                    Automated blood neutrophils/100 leukocytes           67 %   

             42-75       

 

 

                    Automated blood lymphocytes/100 leukocytes           25 %   

             12-44       

 

 

                    Blood monocytes/100 leukocytes           7 %                

 0-12        

 

                    Automated blood eosinophils/100 leukocytes           2 %    

             0-10        

 

                    Automated blood basophils/100 leukocytes           0 %      

           0-10        

 

                    Blood neutrophils automated count (number/volume)           

7.2 10*3            

1.8-7.8        

 

                    Blood lymphocytes automated count (number/volume)           

2.7 10*3            

1.0-4.0        

 

                    Blood monocytes automated count (number/volume)           0.

7 10*3            

0.0-1.0        

 

                    Automated eosinophil count           0.2 10*3/uL           0

.0-0.3        

 

                    Automated blood basophil count (count/volume)           0.0 

10*3/uL           

0.0-0.1        

 

                                        Streptococcus pyogenes antigen detection

 - 18 11:20         

 

                    Streptococcus pyogenes antigen detection           NEGATIVE 

           NEGATIVE 

       

 

                                        Influenza virus A and B antigen detectio

n - 18 11:20         

 

                    FLU RESULT           NEGATIVE FOR INFLUENZA A AND B ANTIGENS

 BY [facility]            

[facility]        

 

                                        Comprehensive metabolic panel - 18

 11:20         

 

                          Serum or plasma sodium measurement (moles/volume)     

      139 mmol/L          

                                        135-145        

 

                          Serum or plasma potassium measurement (moles/volume)  

         4.2 mmol/L       

                                        3.6-5.0        

 

                          Serum or plasma chloride measurement (moles/volume)   

        107 mmol/L        

                                                

 

                    Carbon dioxide           24 mmol/L           21-32        

 

                          Serum or plasma anion gap determination (moles/volume)

           8 mmol/L       

                                        5-14        

 

                          Serum or plasma urea nitrogen measurement (mass/volume

)           9 mg/dL       

                                        7-18        

 

                          Serum or plasma creatinine measurement (mass/volume)  

         0.75 mg/dL       

                                        0.60-1.30        

 

                    Serum or plasma urea nitrogen/creatinine mass ratio         

  12                  NRG 

       

 

                                        Serum or plasma creatinine measurement w

ith calculation of estimated glomerular 

filtration rate           >                         NRG        

 

                    Serum or plasma glucose measurement (mass/volume)           

93 mg/dL            

        

 

                    Serum or plasma calcium measurement (mass/volume)           

9.8 mg/dL           

8.5-10.1        

 

                          Serum or plasma total bilirubin measurement (mass/volu

me)           1.1 mg/dL   

                                        0.1-1.0        

 

                                        Serum or plasma alkaline phosphatase tal

surement (enzymatic activity/volume)    

                          79 U/L                            

 

                                        Serum or plasma aspartate aminotransfera

se measurement (enzymatic 

activity/volume)           27 U/L                    5-34        

 

                                        Serum or plasma alanine aminotransferase

 measurement (enzymatic activity/volume)

                          41 U/L                    0-55        

 

                    Serum or plasma protein measurement (mass/volume)           

7.6 g/dL            

6.4-8.2        

 

                    Serum or plasma albumin measurement (mass/volume)           

4.5 g/dL            

3.2-4.5        

 

                    CALCIUM CORRECTED           9.4 mg/dL           8.5-10.1    

    

 

                                        Bacterial throat culture - 18 11:2

0         

 

                    Bacterial throat culture           NBS                 NRG  

      

 

                                        Urine beta human chorionic gonadotropin 

(hCG) measurement - 18 12:11      

   

 

                          Urine beta human chorionic gonadotropin (hCG) measurem

ent           NEGATIVE    

                                        NEGATIVE        

 

                                        Complete urinalysis with reflex to cultu

re - 18 12:11         

 

                    Urine color determination           YELLOW              NRG 

       

 

                    Urine clarity determination           CLEAR               NR

G        

 

                    Urine pH measurement by test strip           5              

     5-9        

 

                    Specific gravity of urine by test strip           1.020     

          1.016-1.022  

      

 

                    Urine protein assay by test strip, semi-quantitative        

   1+                  

NEGATIVE        

 

                    Urine glucose detection by automated test strip           NE

GATIVE            

NEGATIVE        

 

                          Erythrocytes detection in urine sediment by light micr

oscopy           NEGATIVE 

                                        NEGATIVE        

 

                    Urine ketones detection by automated test strip           NE

GATIVE            

NEGATIVE        

 

                    Urine nitrite detection by test strip           NEGATIVE    

        NEGATIVE    

    

 

                    Urine total bilirubin detection by test strip           NEGA

TIVE            

NEGATIVE        

 

                          Urine urobilinogen measurement by automated test strip

 (mass/volume)           1

 mg/dL                                  NORMAL        

 

                    Urine leukocyte esterase detection by dipstick           2+ 

                 NEGATIVE 

       

 

                                        Automated urine sediment erythrocyte cou

nt by microscopy (number/high power 

field)                    NONE                      NRG        

 

                                        Automated urine sediment leukocyte count

 by microscopy (number/high power field)

                           [HPF]                    NRG        

 

                          Bacteria detection in urine sediment by light microsco

py           MODERATE     

                                        NRG        

 

                                        Squamous epithelial cells detection in u

rine sediment by light microscopy       

                          10-25                     NRG        

 

                          Crystals detection in urine sediment by light microsco

py           NONE         

                                        NRG        

 

                    Casts detection in urine sediment by light microscopy       

    NONE                

NRG        

 

                          Mucus detection in urine sediment by light microscopy 

          MODERATE        

                                        NRG        

 

                    Complete urinalysis with reflex to culture           YES    

             NRG        

 

                                        Bacterial urine culture - 18 12:11

         

 

                    Bacterial urine culture           SEE REPORT            NRG 

       

 

                    COLONY COUNT           .                   NRG        

 

                                        Streptococcus pyogenes antigen detection

 - 19 05:47         

 

                    Streptococcus pyogenes antigen detection           NEGATIVE 

           NEGATIVE 

       

 

                                        Influenza virus A and B antigen detectio

n - 19 05:47         

 

                    FLU RESULT           NEGATIVE FOR INFLUENZA A AND B ANTIGENS

 BY IA            

NRG        

 

                                        Bacterial throat culture - 19 05:4

7         

 

                    Bacterial throat culture           NBS                 NRG  

      

 

                                        COVID-19 ([facility]) - 20 19:00       

  

 

                    PATIENT SYMPTOMATIC?           NOT GIVEN            NRG     

   

 

                    SOURCE:             NOT GIVEN            NRG        

 

                      OVERALL RESULT:           NOT DETECTED            NOT DETE

CTED        

 

                    SARS-CoV-2 RNA:           NEGATIVE            NEGATIVE      

  

 

                    PAN-SARS RNA:           NEGATIVE            NEGATIVE        



                                                                                
                                                                                
                    



Encounters

      



                ACCT No.           Visit Date/Time           Discharge          

 Status         

             Pt. Type           Provider           Facility           Loc./Unit 

          

Complaint        

 

             [id number]           10/05/2016 13:06:00                         

            

Document Registration                                                           

         

 

             [id number]           2016 13:06:00                         

            

Document Registration                                                           

         

 

             [id number]           10/14/2016 05:06:00                         

            

Document Registration                                                           

         

 

             [id number]           2016 10:06:00                         

            

Document Registration                                                           

         

 

             [id number]           12/15/2016 13:05:00                         

            

Document Registration                                                           

         

 

             [id number]           2016 07:05:00                         

            

Document Registration                                                           

         

 

             [id number]           2016 08:06:00                         

            

Document Registration                                                           

         

 

                [id number]           2015 09:30:00           2015 23:59:

59           CLS

                Outpatient           [facility]                       

         

                                                 

 

                [id number]           10/02/2013 09:16:00           10/02/2013 23:59:

59           CLS

                Outpatient           [facility]                       

         

                                                 

 

             [id number]           2013 11:13:00                               

      Document 

Registration                                                                    

 

             [id number]           10/20/2016 18:05:00                         

            

Document Registration                                                           

         

 

                [id number]           2020 09:25:00           2020 23:59:

59           CLS

                Outpatient           CLAUDY MCCOLLUM MD

 [facility]                               

 

             [id number]           2020 18:20:00                              

       Document

 Registration                                                                   

 

 

             [id number]           10/23/2016 13:05:00                         

            

Document Registration                                                           

         

 

             [id number]           2016 13:06:00                         

            

Document Registration                                                           

         

 

             [id number]           10/08/2016 08:07:00                         

            

Document Registration                                                           

         

 

                    [id number]           2019 05:39:00            06:31:00        

                DIS             Outpatient           JEANETTE GONZALEZ MD         

  [facility]           ER                        CONGESTION,RUNNY NOSE,P

OSS 

FEVER,SORE THROAT        

 

                    [id number]           2019 08:54:00            23:59:59        

                CLS             Outpatient           BRANDEN FELIZ MD     

      [facility] 

[facility]           ER                        L EAR PAIN     

   

 

                    [id number]           2019 08:25:00           

019 23:59:59        

                CLS             Emergency           JEANETTE GONZALEZ MD          

 [facility]           ER                        N/V/D;COUGH        

 

                    [id number]           2018 10:03:00           

018 13:01:00        

                DIS             Outpatient           ERICA MOHAN [facility]           ER                        VOMITING;COUGH        

 

                    [id number]           10/30/2017 21:08:00           10/30/2

017 22:01:00        

                DIS             Outpatient           LISBETH KEARNEY        

   [facility]           ER                        COUGH/SOB/VOMITING     

   

 

                    [id number]           2017 10:45:00           

017 23:59:59        

                CLS             Preadmit           CLAUDY MCCOLLUM MD          

 [facility]           RAD                       ABNORMAL QUAD SCREN    

    

 

                    [id number]           2016 10:45:00           

017 00:01:00        

                DIS             Outpatient           CLAUDY MCCOLLUM MD        

   [facility]           RAD                       ABNORMAL QUAD SCREN    

    

 

                    [id number]           2016 12:39:00            15:40:00        

                DIS             Inpatient           CLAUDY MCCOLLUM MD         

  [facility]           LDRP                      INDUCTION        

 

                    [id number]           2016 01:15:00            10:20:00        

                DIS             Outpatient           JAVIER CHOPRA MD         

  [facility]           WSo                       PAIN,HTN,VOMITING      

  

 

                    [id number]           2016 08:20:00            09:49:00        

                DIS             Emergency           BRANDEN FELIZ MD      

     [facility] 

[facility]           ER                        COUGH/SORE THRO

AT VOMITING  

      

 

                    [id number]           2016 21:25:00            23:45:00        

                DIS             Outpatient           JJ OVIEDO DO           

[facility]           WSo                       PAIN        

 

                    [id number]           2016 11:16:00            23:59:59        

                CLS             Outpatient           CLAUDY MCCOLLUM MD        

   [facility]           RAD                       ABNORMAL QUAD SCREEN   

     

 

                    [id number]           2016 10:36:00            13:30:00        

                DIS             Emergency           LISBETH KEARNEY APRKATTY         

  [facility]           ER                        VOMITING DENTAL PAIN 32

 [facility] PREG    

    

 

                    [id number]           10/21/2016 12:18:00           10/21/2

016 23:59:59        

                CLS             Outpatient           CLAUDY MCCOLLUM MD        

   [facility]           RAD                       PLACENTA LOCATION,THIRD

 TRIMESTER 

BLEEDING        

 

                    [id number]           10/17/2016 21:20:00           10/17/2

016 22:47:00        

                DIS             Outpatient           JJ OVIEDO DO           

[facility]           WSo                       SPOTTING        

 

                    [id number]           10/11/2016 18:04:00           10/11/2

016 22:20:00        

                DIS             Outpatient           CLAUDY MCCOLLUM MD        

   [facility]           WSo                       FALL        

 

                    [id number]           2016 10:03:00           

016 23:59:59        

                CLS             Outpatient           CLAUDY MCCOLLUM MD        

   [facility]           RAD                       EVALUATE ANATOMY NOT SE

EN ON PRIOR 

SONOGRAM        

 

                    [id number]           2016 10:05:00           

016 23:59:59        

                CLS             Outpatient           CLAUDY MCCOLLUM MD        

   [facility]           RAD                       Z34        

 

                    [id number]           2016 16:38:00            23:59:59        

                CLS             Outpatient           CLAUDY MCCOLLUM MD        

   [facility]           RAD                       STAT UNABLE TO DOPPLER 

HEART TONES 

AT REPORTED 20        

 

                    [id number]           2016 20:38:00           

016 01:14:00        

                DIS             Emergency           STEFANO ALLEN       

    [facility]           ER                        BACK PAIN/ABD PAIN/VOM

ITING        

 

                    [id number]           2015 11:18:00           

015 12:48:00        

                DIS             Emergency           GLEN CHURCH MD          

 [facility]           ER                        ABD PAIN        

 

                    [id number]           2013 11:27:00           

013 23:59:59        

           CLS           Outpatient                                             

        

   

 

             [id number]           2012 19:40:00                         

            

Document Registration                                                           

         

 

             [id number]           11/10/2016 13:06:00                         

            

Document Registration

## 2020-05-25 NOTE — XMS REPORT
[facility]

                             Created on: 2020



Jr Rico

External Reference #: [id number]

: 1990

Sex: Female



Demographics





                          Address                   [address] [place]  [address]

 

                          Preferred Language        Unknown

 

                          Marital Status            Unknown

 

                          Uatsdin Affiliation     Unknown

 

                          Race                      Unknown

 

                          Ethnic Group              Unknown





Author





                          Author                    Jr Lester

 

                          Organization              [facility]

 

                          Address                   [address] [place]  [address]



 

                          Phone                     [phone]







Care Team Providers





                    Care Team Member Name Role                Phone

 

                    BUCKY Lester    Unavailable         [phone]







PROBLEMS





          Type      Condition ICD9-CM Code NAY58-BV Code Onset Dates Condition S

tatus SNOMED 

Code

 

                          Problem                   Severe episode of recurrent 

major depressive disorder, without psychotic

features                  F33.2                     Active       [id number]

 

          Problem   Personality disorder           F60.9               Active   

 [id number]

 

          Problem   History of gestational hypertension           Z87.59        

      Active    [id number]

 

          Problem   Generalized anxiety disorder           F41.1               A

ctive    [id number]

 

          Problem   Acute seasonal allergic rhinitis, unspecified trigger       

    J30.2               Active    

[id number]







ALLERGIES

No Information



ENCOUNTERS





                Encounter       Location        Date            Diagnosis

 

                    [place] [address]  [id number] [place] [phone]                                      

 

                          [facility]     [id number]

[address] [place] [phone]

                                        Generalized anxiety disorder

 F41.1 ; Severe episode of recurrent 

major depressive disorder, without psychotic features F33.2 and Personality 
disorder F60.9

 

                          [facility]     [id number]

[address] [place] [phone]

                                         

 

                          [facility]  [id number]

[address] [place] [address] 

[phone]                25 Mar, 2020              Fever of undetermined origin

 R50.9

 

                          [facility]     [address] [facility] [id number]

[address] [place] [phone]

                          25 Mar, 2020               

 

                          [facility]     [address] [id number]

[address] [place] [phone]

                          25 Mar, 2020               

 

                          [facility]  [address] [id number]

[address] [place] [address] 

[phone]                20 Mar, 2020              Sore throat J02.9 and Influe

nza A J10.1

 

                          [place] [address] [address] [facility] [id number]

[address] [place] [phone]

                          20 Mar, 2020               

 

                    [facility] [place] [id number] [place] [phone] 20 Mar, 

2020                                     

 

                          [place] [address] [facility] [id number]

[address] [place] [phone]

                          20 Mar, 2020               

 

                          [place] [address] [id number]

[address] [place] [phone]

                                        Encounter for Depo-Provera c

ontraception Z30.42

 

                          [place] [id number]

[address] [place] [phone]

                                        Severe episode of recurrent 

major depressive disorder, without 

psychotic features F33.2 and Generalized anxiety disorder F41.1

 

                          [place] [address] [id number]

[address] [place] [phone]

                          15 Bird, 2020              Severe episode of recurrent 

major depressive disorder, without 

psychotic features F33.2 ; Generalized anxiety disorder F41.1 and Personality 
disorder F60.9

 

                          [place] [id number]

[address] [place] [phone]

                          26 Dec, 2019              Severe episode of recurrent 

major depressive disorder, without 

psychotic features F33.2 and Generalized anxiety disorder F41.1

 

                          [place] [address] [id number]

[address] [place] [phone]

                          16 Dec, 2019              Severe episode of recurrent 

major depressive disorder, without 

psychotic features F33.2 ; Generalized anxiety disorder F41.1 and Personality 
disorder F60.9

 

                          [place] [address] [id number]

[address] [place] [phone]

                                        Severe episode of recurrent 

major depressive disorder, without 

psychotic features F33.2 and Generalized anxiety disorder F41.1

 

                          [place] [id number]

[address] [place] [phone]

                                        Severe episode of recurrent 

major depressive disorder, without 

psychotic features F33.2

 

                          [place] [id number]

[address] [place] [phone]

                          14 2019              Severe episode of recurrent 

major depressive disorder, without 

psychotic features F33.2

 

                          [facility]     [id number] [place] [address]

                                        History of gestational hyper

tension Z87.59 ; Severe episode of 

recurrent major depressive disorder, without psychotic features F33.2 ; Birth 
control counseling Z30.09 and Encounter for Depo-Provera contraception Z30.42

 

                          [facility]     [id number]

[address] [place] [phone]

                                         

 

                          [facility]     [address] [phone]

                                         

 

                          [facility]  [id number] [place] [address] 

[phone]                              Acute seasonal allergic rhin

itis, unspecified trigger 

J30.2 and Bronchitis J40

 

                          [facility]     [id number]

[address] [place] [phone]

                          15 Aug, 2017              Encounter for Depo-Provera c

ontraception Z30.42

 

                          [place] [id number]

[address] [place] [phone]

                          10 May, 2017              Encounter for Depo-Provera c

ontraception Z30.42

 

                          [place] [address] [address]

                          10 May, 2017               

 

                          [facility]     [address] [phone]

                                        Encounter for Depo-Provera c

ontraception Z30.42 ; History of 

gestational hypertension Z87.59 and Postpartum depression F53

 

                          [facility]     [id number]

[address] [place] [phone]

                                         

 

                          [facility]     [id number] [place] [address]

                                        Postpartum depression F53 ; 

Patient is a currently breast-feeding 

mother Z39.1 and Anxiety, generalized F41.1

 

                          [facility]     [address] [id number]

[address] [place] [phone]

                                         

 

                          [facility]     [address] [id number]

[address] [place] [phone]

                                         

 

                          [facility]     [address] [facility] [id number]

[address] [place] [phone]

                                         

 

                          [facility]     [address] [id number]

[address] [place] [phone]

                                         

 

                          [facility]     [address] [id number]

[address] [place] [phone]

                                        Postpartum depression F53 an

d Anxiety, generalized F41.1

 

                          [facility]     [address] [facility] [id number]

[address] [place] [phone]

                                        Anxiety, generalized F41.1 a

nd Postpartum depression F53

 

                          [facility]     [address] [facility] [id number]

[address] [place] [phone]

                                        Postpartum depression F53 an

d Generalized anxiety disorder F41.1

 

                          [facility]     [address] [facility] [id number]

[address] [place] [phone]

                                        Postpartum depression F53

 

                          [facility]     [address] [facility] [id number]

[address] [place] [phone]

                                        Postpartum depression F53 an

d Patient is a currently breast-feeding

mother Z39.1

 

                          [facility]     [address] [facility] [id number]

[address] [place] [phone]

                          27 Dec, 2016               

 

                          [facility]  [address] [id number]

[address] [place] [address] 

[phone]                24 Dec, 2016               

 

                          [facility]     [address] [facility] [id number]

[address] [place] [phone]

                          23 Dec, 2016              Chronic hypertension affecti

ng pregnancy O10.919

 

                          [facility]     [address] [facility] [id number]

[address] [place] [phone]

                          23 Dec, 2016               

 

                          [facility]     [address] [facility] [id number]

[address] [place] [phone]

                          14 Dec, 2016              Proteinuria affecting pregna

ncy in third trimester O12.13 ; Chronic

hypertension affecting pregnancy O10.919 and 36 weeks gestation of pregnancy 
Z3A.36

 

                          [facility]     [address] [facility] [id number]

[address] [place] [phone]

                          09 Dec, 2016              Elevated blood pressure affe

cting pregnancy in first trimester, 

antepartum O13.1

 

                          [facility]     [address] [facility] [id number]

[address] [place] [phone]

                          07 Dec, 2016              Prenatal care, first pregnan

cy in third trimester Z34.03 ; Chronic 

hypertension affecting pregnancy O10.919 ; 35 weeks gestation of pregnancy 
Z3A.35 and STD exposure Z20.2

 

                          [place] [address] [facility] [id number]

[address] [place] [phone]

                          30 2016              Proteinuria affecting pregna

ncy in third trimester O12.13 ; Chronic

hypertension affecting pregnancy O10.919 ; Prenatal care, first pregnancy in 
third trimester Z34.03 and 34 weeks gestation of pregnancy Z3A.34

 

                          [place] [address] [facility] [id number]

[address] [place] [phone]

                                         

 

                          [place] [address] [facility] [id number]

[address] [place] [phone]

                                        Proteinuria affecting pregna

ncy in third trimester O12.13 ; 33 

weeks gestation of pregnancy Z3A.33 and Chronic hypertension affecting pregnancy
O10.919

 

                          [place] [address] [facility] [id number]

[address] [place] [phone]

                          16 2016               

 

                          [place] [address] [facility] [id number]

[address] [place] [phone]

                          15 2016               

 

                          [place] [address] [facility] [id number]

[address] [place] [phone]

                                        Third trimester pregnancy Z3

3.1 ; 31 weeks gestation of pregnancy 

Z3A.31 ; Abnormal quad screen O28.0 ; Proteinuria affecting pregnancy in third 
trimester O12.13 and Encounter for immunization Z23

 

                          [place] [address] [facility] [id number]

[address] [place] [phone]

                          21 Oct, 2016               

 

                          [place] [address] [facility] [id number]

[address] [place] [phone]

                          18 Oct, 2016              Proteinuria affecting pregna

ncy in third trimester O12.13 ; Third 

trimester bleeding O46.93 ; Unspecified abdominal pain R10.9 ; Other specified 
pregnancy related conditions, unspecified trimester O26.899 and 28 weeks 
gestation of pregnancy Z3A.28

 

                          [place] [address] [facility] [id number]

[address] [place] [phone]

                          13 Oct, 2016               

 

                          [place] [address] [facility] [id number]

[address] [place] [phone]

                          12 Oct, 2016              Syncope, unspecified syncope

 type R55 ; Hypertension affecting 

pregnancy in second trimester O16.2 ; Proteinuria affecting pregnancy in second 
trimester O12.12 and 27 weeks gestation of pregnancy Z3A.27

 

                          [place] [address] [facility] [id number]

[address] [place] [phone]

                          07 Oct, 2016              Diabetes mellitus screening 

Z13.1 ; Pregnancy, first, second 

trimester Z34.02 and 27 weeks gestation of pregnancy Z3A.27

 

                          [place] [address] [facility] [id number]

[address] [place] [phone]

                          04 Oct, 2016              Hypertension affecting pregn

roro in second trimester O16.2 and 

Proteinuria affecting pregnancy in second trimester O12.12

 

                          [place] [address] [facility] [id number]

[address] [place] [phone]

                          04 Oct, 2016              Hypertension affecting pregn

roro in second trimester O16.2 and 

Proteinuria affecting pregnancy in second trimester O12.12

 

                          [place] [address] [facility] [id number]

[address] [place] [phone]

                          03 Oct, 2016              Proteinuria affecting pregna

ncy in second trimester O12.12 and 

Hypertension affecting pregnancy in second trimester O16.2

 

                          [place] [address] [facility] [id number]

[address] [place] [phone]

                          03 Oct, 2016              Hypertension affecting pregn

roro in second trimester O16.2

 

                          [place] [id number]

[address] [place] [phone]

                          03 Oct, 2016              Hypertension affecting pregn

roro in second trimester O16.2

 

                          [place] [address] [id number]

[address] [place] [phone]

                          23 Sep, 2016              Pregnancy, first, second tri

mester Z34.02 ; 25 weeks gestation of 

pregnancy Z3A.25 and Encounter for immunization Z23

 

                          [place] [id number]

[address] [place] [phone]

                          08 Sep, 2016              Hypertension affecting pregn

roro in second trimester O16.2 ; 

Proteinuria affecting pregnancy in second trimester O12.12 and 22 weeks 
gestation of pregnancy Z3A.22

 

                          [place] [address] [address]

                          01 Sep, 2016              21 weeks gestation of pregna

ncy Z3A.21 ; Proteinuria affecting 

pregnancy in second trimester O12.12 ; Hypertension affecting pregnancy in 
second trimester O16.2 and Blurry vision, left eye H53.8

 

                          [place] [address] [place] [id number]

[address] [place] [phone]

                          29 Aug, 2016              Pregnancy, first, second tri

mester Z34.02 ; Proteinuria affecting 

pregnancy in third trimester O12.13 ; Hypertension affecting pregnancy, second 
trimester O16.2 ; 21 weeks gestation of pregnancy Z3A.21 and Evaluate anatomy 
not seen on prior sonogram Z36

 

                          [place] [id number]

[address] [place] [phone]

                          26 Aug, 2016              Pregnancy, first, second tri

mester Z34.02 ; Hypertension affecting 

pregnancy, second trimester O16.2 ; Proteinuria affecting pregnancy in third 
trimester O12.13 ; 21 weeks gestation of pregnancy Z3A.21 and Evaluate anatomy 
not seen on prior sonogram Z36

 

                          [place] [id number]

[address] [place] [phone]

                          04 Aug, 2016              Abnormal quad screen O28.0 a

nd 17 weeks gestation of pregnancy 

Z3A.17

 

                          [place] [address] [id number]

[address] [place] [phone]

                                        Normal pregnancy, first Z34.

00 and 17 weeks gestation of pregnancy 

Z3A.17

 

                          [facility]     [address] [id number]

[address] [place] [phone]

                                        Normal pregnancy, first Z34.

00 and 12 weeks gestation of pregnancy 

Z3A.12

 

                          [facility]     [address] [facility] [id number]

[address] [place] [phone]

                          27 May, 2016              Fetal heart tones not heard 

O76 ; 20 weeks gestation of pregnancy 

Z3A.20 and Pregnancy, first, second trimester Z34.02

 

                          [facility]     [address] [facility] [id number]

[address] [place] [phone]

                          18 May, 2016               

 

                          [facility]     [address] [facility] [id number]

[address] [place] [phone]

                          17 May, 2016              Encounter for pregnancy test

, result positive Z32.01

 

                          [facility]  [address] [facility] [id number]

[address] [place] [address] 

[phone]                              Low back pain M54.5 ; Sore t

hroat J02.9 ; Acute right 

lower quadrant pain R10.31 and Cough R05

 

                          [facility]     [address] [id number]

[address] [place] [phone]

                                         

 

                          [facility]     [address] [facility] [id number]

[address] [place] [phone]

                                         

 

                          [facility]     [address] [id number]

[address] [place] [phone]

                                         

 

                          [facility]     [address] [facility] [id number]

[address] [place] [phone]

                                         

 

                          [facility]     [address] [facility] [id number]

[address] [place] [phone]

                                         

 

                          [facility]     [address] [facility] [id number]

[address] [place] [phone]

                                         

 

                          [facility]     [address] [facility] [id number]

[address] [place] [phone]

                                         

 

                          [facility]     [address] [id number]

[address] [place] [phone]

                                         

 

                          [facility]     [address] [id number]

[address] [place] [phone]

                          31 Dec, 2014               

 

                          [facility]     [address] [id number]

[address] [place] [phone]

                          31 Dec, 2014               

 

                          [facility]     [address] [id number]

[address] [place] [phone]

                          22 Oct, 2013               

 

                          [facility]     [address] [id number]

[address] [place] [phone]

                          22 Oct, 2013               

 

                          [facility]     [address] [id number]

[address] [place] [phone]

                          15 Oct, 2013               

 

                          [facility]     [address] [id number]

[address] [place] [phone]

                          15 Oct, 2013               

 

                          [facility]     [address] [id number]

[address] [place] [phone]

                          08 Oct, 2013               

 

                          [facility]     [address] [id number]

[address] [place] [phone]

                          05 Oct, 2013               

 

                          [facility]     [address] [id number]

[address] [place] [phone]

                          04 Oct, 2013               

 

                          [facility]     [address] [id number]

[address] [place] [id number]

                          02 Oct, 2013               

 

                          [facility]     [address] [id number]

[address] [place] [id number]

                          02 Oct, 2013               

 

                          [facility]     [address] [id number]

[address] [place] [phone]

                          27 Aug, 2013               

 

                          [facility]     [address] [id number]

[address] [place] [phone]

                          22 Aug, 2013               







IMMUNIZATIONS

No Known Immunizations



SOCIAL HISTORY

Never Assessed



REASON FOR VISIT





PLAN OF CARE





VITAL SIGNS





                    Height              71 in               2013

 

                    Weight              215.9 lbs           2013

 

                    Temperature         97.9 degrees Fahrenheit 2013

 

                    Heart Rate          88 bpm              2013

 

                    Respiratory Rate    16                  2013

 

                    Blood pressure systolic 126 mmHg            2013

 

                    Blood pressure diastolic 70 mmHg             2013







MEDICATIONS

Unknown Medications



RESULTS

No Results



PROCEDURES





                Procedure       Date Ordered    Result          Body Site

 

                URINE PREGNANCY TEST Aug 22, 2013                     

 

                URINALYSIS, AUTO, W/O SCOPE Aug 22, 2013                     







INSTRUCTIONS





MEDICATIONS ADMINISTERED

No Known Medications



MEDICAL (GENERAL) HISTORY





                    Type                Description         Date

 

                    Medical History     HTN                  

 

                    Medical History     Capac Previa     

 

                    Medical History     Pre-eclampsia in pregnancy  

 

                    Medical History     Post-partum Depression  

 

                    Medical History     Postpartum depression  

 

                    Medical History     Postpartum depression  

 

                    Medical History     Anxiety, generalized  

 

                    Surgical History    No Surgical history information  

 

                    Hospitalization History Childbirth

## 2020-05-25 NOTE — XMS REPORT
Patient Summarization Differential

                             Created on: 2020



Jr Rico

External Reference #: [id number]

: 1990

Sex: Female



Demographics





                          Address                   [address] [place]  [address]

 

                          Home Phone                [phone]

 

                          Preferred Language        English

 

                          Marital Status            S

 

                          Anglican Affiliation     [id number]

 

                          Race                      White

 

                          Ethnic Group              Not  or 





Author





                          Author                    [employer]

 

                          [facility]              [employer] [facility]

 

                          Address                   [address]  [address]



 

                          Phone                     [phone]







Care Team Providers





                    Care Team Member Name Role                Phone

 

                    CHUN, CLAUDY      Unavailable         Unavailable

 

                    CHUN, CLAUDY      Unavailable         [phone]

 

                    CHUN, CLAUDY      Unavailable         [phone]

 

                    CHUN, CLAUDY N    Unavailable         [phone]

 

                    NO, LOCAL PHYSICIAN Unavailable         Unavailable

 

                    CHUN, CLAUDY      Unavailable         [phone]

 

                    CHUN, CLAUDY      Unavailable         [phone]

 

                    CHUN, CLAUDY      Unavailable         [phone]

 

                    CHUN, CLAUDY      Unavailable         [phone]

 

                    CHUN, CLAUDY      Unavailable         [phone]

 

                    ANIKA MARADIAGA     Unavailable         [phone]

 

                    ANIKA MARADIAGA     Unavailable         [phone]

 

                    ANIKA MARADIAGA     Unavailable         [phone]

 

                    ANIKA MARADIAGA     Unavailable         [phone]

 

                    ANIKA MARADIAGA     Unavailable         [phone]

 

                    CHUN, CLAUDY      Unavailable         [phone]

 

                    CHUN, CLAUDY      Unavailable         [phone]

 

                    CHUN, CLAUDY      Unavailable         [phone]

 

                    CHUN, CLAUDY N    Unavailable         [phone]

 

                    CHUN, CLAUDY      Unavailable         [phone]

 

                    CHUN, CLAUDY      Unavailable         [phone]

 

                    MARTHA PARMAR    Unavailable         [phone]

 

                    LISBETH KEARNEY Unavailable         Unavailable

 

                    JJ OVIEDO DO    Unavailable         Unavailable

 

                    CHUN AMADEO LOPEZY N Unavailable         Unavailable

 

                    AMADEO MCCOLLUM MDY N Unavailable         Unavailable

 

                    CHUN, CLAUDY N    Unavailable         [phone]

 

                    Migration, Doctor   Unavailable         Unavailable

 

                    JAVIER CHOPRA MD  Unavailable         Unavailable

 

                    CHUN, CLAUDY N    PCP                 [phone]

 

                    PCP, OUTSIDE        Unavailable         Unavailable

 

                    zzHEIMAN, BUCKY     Unavailable         [phone]

 

                    STEFANO ALLEN Unavailable         Unavailable

 

                    RANJIT LOPEZ, GL[place] MILLS  Unavailable         Unavailable

 

                    zzHEIMAN, BUCKY     Unavailable         [phone]

 

                    zzHEIMAN, BUCKY     Unavailable         [phone]

 

                    TRINI LOPEZ, BRANDEN LOMAS Unavailable         Unavailable

 

                    CLAUDY MCCOLLUM MD Unavailable         Unavailable

 

                    CLAUDY MCCOLLUM    PCP                 [phone]

 

                    JEANETTE GONZALEZ     Unavailable         Unavailable

 

                    AGUSTÍN LOPEZ, SALENA MCDONALD Unavailable         Unavailable

 

                    CHUNCLAUDY LANDEROS    Unavailable         Unavailable

 

                          Unavailable               Unavailable

 

                    zzHEIMAN, BUCKY     Unavailable         [phone]

 

                    zzHEIMAN, BUCKY     Unavailable         [phone]

 

                    zzHEIMAN, BUCKY     Unavailable         [phone]

 

                          Unavailable               Unavailable

 

                          Unavailable               Unavailable

 

                          Unavailable               Unavailable

 

                          Unavailable               Unavailable

 

                          Unavailable               Unavailable



                                            



Allergies

                      



      



           Normalized  Allergy   Reported  Date of   Reaction(s)  Care Provider 

 Facility



                 Allergy Type    classification  allergen        Allergy Onset  

 

 

      



            Drug Allergy  diphenhydrAMIN  diphenhydrAMIN  2019 -  no infor

cassi MCCOLLUM                            [facility]



              (4 sources.)  MD [facility] NAVARRETE



                     Translations:       Translations:       Hospital -



                     [ Allergy to        [                   [facility]



                     substance]          diphenhydramin      ([address]30)



                                         e    



                                         ([id number])]    

 

      



            DA (16     Unclassified  No Known Drug  2012 -  no information

  GLEN [facility]CH

,                                        Not Available



                 sources.)       Allergies       MD              (23197)



                                                                                
       



Medications

                      



        



         Medication  Ingredient  Drug    Dose    Dates   Status  Sig     Sig    

 Care



                 Class(es)       (Normalized)    (Original)      Provid



                                         er

 

        



         no      Albuterol  no      8.5 g   20  Complete  take 8.5 g  Albu

terol  Trace



         information  Sulfate  information   16 -    d       by      Sulfate  en

 L



            (2         (Proair    10-11-20   inhalation  (Proair Hfa)  Jac



            sources.)  Hfa) 8.5 Gm    16         every four  8.5 Gm     (no



                 Hfa.aer.ad,      hours as        Hfa.aer.ad,     phone)



                     2 Puff              needed              2 Puff 



                           Respiratory               Respiratory 



                           (Inhalation               (Inhalation) 



                           )                         Every 4HRS 



                                         as needed 



                                         for 



                                         Shortness Of 



                                         Breath 



                                         16 



                                         Discontinued 

 

        



         benzonatate  benzonatate  Non-narcoti   20  Complete  no      Cosme

zonatate  no



           100 mg oral   c         19 -      d         information  Discontinued

  name



                 capsule (1      Antitussive     20        100 ORAL 



                     source.)            19 -                Every 6 



                           20                  Hours as 



                           19                        needed for 



                                         Cough  



                                         6:02am 



                                         (One-Time) 

 

        



         nitrofurant  nitrofurant  Nitrofuran  25 mg   20  Complete  take 

1  

Nitrofuranto                             Erica



         oin,    oin     Antibacteri   18 -    d       tablet by  in      [facility]         20   mouth twice  Monohyd/M-Cr  (no



              ls 25 mg /     18           daily        yst          phone)



                           nitrofurant               (Macrobid 



                           oin,                      100 Mg 



                           monohydrate               Capsule) 100 



                           75 mg oral                Mg Capsule 1 



                           capsule (1                Tab ORAL 



                           source.)                  Twice A Day 



                                         5 Days 10 



                                         Cap 18 

 

        



         nitrofurant  NITROFURANT  Nitrofuran  25 mg   20  Complete  no   

   

Nitrofuranto                             no



         oin,    OIN,    Antibacteri   18 -    d       information  in      name



              divyarysta  DIVYARYSTA  al           20     Monohyd/M-Cr 



                 ls 25 mg /      LS /            19              yst 



                     nitrofurant         Nitrofurant         Discontinued 



                     oin,                oin,                1 ORAL Twice 



                     monohydrate         Monohydrate         A Day 10 5 



                           75 mg oral                November 



                           capsule (3                7th, 2018 



                           sources.)                 12:49pm 



                                         2019 

 

        



         no      Prenatal  no      20  Complete  no      Prenatal  no



         information  Vit/Iron  information   19      d       information  Vit/I

elham  name



                     (1 source.)         Fumarate/Fa         Fumarate/Fa 



                                         Discontinued 



                                         1 ORAL Daily 



                                         2019 



                                                                                
                                     



Problems

                      Active Problems            

            



      



           Problem   Normalized  Date Last  Normalized  Normalized  Provider  Fa

cility



              Classification  Problem(s)   Recorded     Problem      Problem Sta

tus  



                                         Duration   

 

      



            Residual   12 weeks   Episodic   Active     BUCKY silviaHEIMAN  Communit

y



                 codes;          gestation of     [address]66 (Other    Health Center



                 unclassified    pregnancy       Phone:          of [facility]



                 (3 sources.)    Translations:     [phone])  [place] ([address]

)



                                         [ - 12 weeks     



                                         gestation of     



                                         pregnancy     



                                         Z3A.12]     

 

      



            Residual   17 weeks   Episodic   Active     BUCKY zzHEIMAN  Communit

y



                 codes;          gestation of     20841 (Other    Health Center



                 unclassified    pregnancy       Phone:          of [facility]



                 (6 sources.)    Translations:     [phone])  [place] ([address]

)



                                         [ - 17 weeks     



                                         gestation of     



                                         pregnancy     



                                         Z3A.17]     

 

      



            Residual   20 weeks   Episodic   Active     BUCKY zzHEIMAN  Communit

y



                 codes;          gestation of     33571 (Other    Health Center



                 unclassified    pregnancy       Phone:          of [facility]



                 (3 sources.)    Translations:     [phone])  [place] ([address]

)



                                         [ - 20 weeks     



                                         gestation of     



                                         pregnancy     



                                         Z3A.20]     

 

      



            Residual   21 weeks   Episodic   Active     BUCKY zzHEIMAN  Communit

y



                 codes;          gestation of     73948 (Other    Health Center



                 unclassified    pregnancy       Phone:          of [facility]



                 (9 sources.)    Translations:     [phone])  [place] ([address]

)



                                         [ - 21 weeks     



                                         gestation of     



                                         pregnancy     



                                         Z3A.21]     

 

      



            Residual   22 weeks   Episodic   Active     BUCKY zzHEIMAN  Communit

y



                 codes;          gestation of     18001 (Other    Health Center



                 unclassified    pregnancy       Phone:          of [facility]



                 (3 sources.)    Translations:     [phone])  [place] ([address]

)



                                         [ - 22 weeks     



                                         gestation of     



                                         pregnancy     



                                         Z3A.22]     

 

      



            Residual   25 weeks   Episodic   Active     BUCKY zzHEIMAN  Communit

y



                 codes;          gestation of     74302 (Other    Health Center



                 unclassified    pregnancy       Phone:          of [facility]



                 (3 sources.)    Translations:     [phone])  [place] ([address]

)



                                         [ - 25 weeks     



                                         gestation of     



                                         pregnancy     



                                         Z3A.25]     

 

      



            Residual   27 weeks   Episodic   Active     BUCKY zzHEIMAN  Communit

y



                 codes;          gestation of     56239 (Other    Health Center



                 unclassified    pregnancy       Phone:          of [facility]



                 (6 sources.)    Translations:     [phone])  [place] ([address]

)



                                         [ - 27 weeks     



                                         gestation of     



                                         pregnancy     



                                         Z3A.27]     

 

      



            Residual   28 weeks   Episodic   Active     BUCKY zzHEIMAN  Communit

y



                 codes;          gestation of     91517 (Other    Health Center



                 unclassified    pregnancy       Phone:          of [facility]



                 (3 sources.)    Translations:     [phone])  [place] ([address]

)



                                         [ - 28 weeks     



                                         gestation of     



                                         pregnancy     



                                         Z3A.28]     

 

      



            Residual   31 weeks   Episodic   Active     BUCKY zzHEIMAN  Communit

y



                 codes;          gestation of     00574 (Other    Health Center



                 unclassified    pregnancy       Phone:          of [facility]



                 (3 sources.)    Translations:     [phone])  [place] ([address]

)



                                         [ - 31 weeks     



                                         gestation of     



                                         pregnancy     



                                         Z3A.31]     

 

      



            Residual   33 weeks   Episodic   Active     BUCKY zzHEIMAN  Communit

y



                 codes;          gestation of     79324 (Other    Health Center



                 unclassified    pregnancy       Phone:          of [facility]



                 (3 sources.)    Translations:     [phone])  [place] ([address]

)



                                         [ - 33 weeks     



                                         gestation of     



                                         pregnancy     



                                         Z3A.33]     

 

      



            Residual   34 weeks   Episodic   Active     CLAUDY CHUN  [facility]



                 codes;          gestation of     [facility]



                     unclassified        pregnancy           Hospital -



                     (5 sources.)        Translations:       [facility]



                           [ - 34 weeks              (49098)



                                         gestation of     



                                         pregnancy     



                                         Z3A.34]     

 

      



            Residual   35 weeks   Episodic   Active     BUCKY zzHEIMAN  Communit

y



                 codes;          gestation of     18975 (Other    Health Center



                 unclassified    pregnancy       Phone:          of [facility]



                 (3 sources.)    Translations:     [phone])  [place] ([address]173

)



                                         [ - 35 weeks     



                                         gestation of     



                                         pregnancy     



                                         Z3A.35]     

 

      



            Residual   36 weeks   Episodic   Active     BUCKY zzHEIMAN  Communit

y



                 codes;          gestation of     66411 (Other    Health Center



                 unclassified    pregnancy       Phone:          of [facility]



                 (3 sources.)    Translations:     [phone])  [place] ([address]

)



                                         [ - 36 weeks     



                                         gestation of     



                                         pregnancy     



                                         Z3A.36]     

 

      



            Allergic   Allergy status   Episodic   Active     JEANETTE LISA  [facility] 

[facility]



                     reactions (1        to other            [place]



                     source.)            drugs,              Hospital -



                           medicaments               [facility]



                           and biological            (96370)



                                         substances     



                                         status     

 

      



            Mood disorders  Bipolar    Chronic    Active     PETER KEARNEY  Not Av

ailable



                     (20 sources.)       disorder,           (42733)



                                         unspecified     



                                         Translations:     



                                         [ - Severe     



                                         episode of     



                                         recurrent     



                                         major     



                                         depressive     



                                         disorder,     



                                         without     



                                         psychotic     



                                         features     



                                         F33.2, Severe     



                                         episode of     



                                         recurrent     



                                         major     



                                         depressive     



                                         disorder,     



                                         without     



                                         psychotic     



                                         features,     



                                         Severe episode     



                                         of recurrent     



                                         major     



                                         depressive     



                                         disorder,     



                                         without     



                                         psychotic     



                                         features]     

 

      



            Other      Chronic    Episodic   Active     CLAUDY CHUN  [facility]

 Via



                 complications   hypertension     [address]           [place]



                     of pregnancy        in obstetric        Hospital



                     (1 source.)         context             (24086)

 

      



            Essential  Essential   Chronic    Active     BRANDEN    [facility]



                 hypertension    (primary)       MD [place] FELIZ



                     (2 sources.)        hypertension        Hospital [facility]



                                         (20168)

 

      



            Residual   Family history   Episodic   Active     BRANDEN    [facility]



                 codes;          of ischemic     MD [place] FELIZ



                     unclassified        heart disease       Hospital -



                     (2 sources.)        and other           [facility]



                           diseases of               (25914)



                                         the     



                                         circulatory     



                                         system     

 

      



            Residual   Family history   Episodic   Active     PETER KEARNEY  Not A

vailable



                     codes;              of malignant        (20998)



                           unclassified              neoplasm of     



                           (4 sources.)              digestive     



                                         organs     

 

      



            Fever of   Fever,     Episodic   Active     BUCKY zzHEIMAN  Commun[facility]

y



                 unknown origin  unspecified     61756 (Other    Health Center



                 (3 sources.)    Translations:     Phone:          of [facility]



                     [ - Fever of        [phone])      [place] ([address])



                                         undetermined     



                                         origin R50.9]     

 

      



            Influenza (3  Influenza due   Episodic   Active     [facility]Y zzHEIMAN  

Community



                 sources.)       to other        90938 (Other    Health Center



                     identified          Phone:              of [facility]



                     influenza           [phone])      [place] ([address])



                                         virus with     



                                         other     



                                         respiratory     



                                         manifestations     



                                         Translations:     



                                         [ - Influenza     



                                         A J10.1]     

 

      



            Substance-rela  Nicotine   Chronic    Active     GLEN RANJIT ,  Not

 Available



                 iqra disorders   dependence,     MD              (23701)



                           (7 sources.)              cigarettes,     



                                         uncomplicated     

 

      



            Other      Nonreassuring   Episodic   Active     CLAUDY CHUN  [facility]

[facility]



                        fetal status     50231           [place]



                           conditions (1             Hospital



                           source.)                  (41490)

 

      



            Other ear and  Otalgia    Episodic   Active     CLAUDY CHUN  [facility]

[facility]



                     sense organ         [address]10               [place]



                           disorders (1              Hospital



                           source.)                  (26518)

 

      



            Personality  Personality   Chronic    Active     BUCKY zzHEIMAN  Com

munity



                 disorders (12   disorder        [address] (Other    [facility] Center



                 sources.)       Translations:     Phone:          of [facility]



                     [ Personality       [phone])      [place] ([address])



                                         disorder, -     



                                         Personality     



                                         disorder     



                                         F60.9]     

 

      



            Intestinal  Viral      Episodic   Active     CLAUDY CHUN  [facility]

n Via



                 infection (1    gastroenteriti     16182           [place]



                     source.)            [facility]



                                         ([address])



            

            Past or Other Problems            

            



      



           Problem   Normalized  Date Last  Normalized  Normalized  Provider  Fa

cility



              Classification  Problem(s)   Recorded     Problem      Problem Sta

tus  



                                         Duration   

 

      



            Residual   37 weeks   no information  no information  JAVIER CHOPRA ,

  Not 

Available



                 codes;          gestation of     MD              (41947)



                           unclassified              pregnancy     



                                         (4 sources.)      

 

      



            NEGATED    8 weeks    no information  no information  CLAUDY CHUN 

 Not Available



                 no              gestation of     , MD            (29969)



                           information (5            pregnancy     



                           sources.)                 Translations:     



                                         [ 28 WEEKS     



                                         GESTATION OF     



                                         PREGNANCY, 22     



                                         WEEKS     



                                         GESTATION OF     



                                         PREGNANCY, 37     



                                         WEEKS     



                                         GESTATION OF     



                                         PREGNANCY]     

 

      



            Other      Diseases of   Episodic   Completed  BRANDEN    Not Availa

ble



                 complications   the             MD TRINI   (52494)



                           of pregnancy              respiratory     



                           (1 source.)               system     



                                         complicating     



                                         pregnancy,     



                                         third     



                                         trimester     

 

      



            Early or   False labor   Episodic   Completed  JJ OVIEDO ,  Not [place]

ilable



                 threatened      before 37       DO              (71341)



                           labor (2                  completed     



                           sources.)                 weeks of     



                                         gestation,     



                                         third     



                                         trimester     

 

      



            OB-related  First degree   Episodic   Completed  CLAUDY MAKIOCH  Not 

Available



                 trauma to       perineal        , MD            (95053)



                           perineum and              laceration     



                           vulva (2                  during     



                           sources.)                 delivery     

 

      



            Other      Injury,    Episodic   Completed  CLAUDY CHUN  Not Avail

able



                 complications   poisoning and     , MD            (76425)



                           of pregnancy              certain other     



                           (1 source.)               consequences     



                                         of external     



                                         causes     



                                         complicating     



                                         pregnancy,     



                                         third     



                                         trimester     

 

      



            Polyhydramnios  Oligohydramnio   Episodic   Completed  CLAUDY CHUN

  Not 

Available



                 and other       s, third        , MD            (81917)



                           problems of               trimester, not     



                           amniotic                  applicable or     



                           cavity (2                 unspecified     



                                         sources.)      

 

      



            Other ear and  Otalgia, left   Episodic   Completed  BRANDEN    [facility] 

[facility]



                 sense organ     ear             MD TRINI   [place]



                           disorders (2              Hospital -



                           sources.)                 [facility]



                                         ([address]66)

 

      



            External   Other external   no information  no information  CLAUDY 

CHUN  Not 

Available



                 Injury -        cause status     , MD            (10432)



                                         Unspecified (1      



                                         source.)      

 

      



            Other      Other      Episodic   Completed  CLAUDY CHUN  Not Avail

able



                 complications   immediate       , MD            (43640)



                           of birth;                 postpartum     



                           puerperium                hemorrhage     



                                         affecting      



                                         management of      



                                         mother (2      



                                         sources.)      

 

      



            Screening and  Personal   Episodic   Completed  LISBETH KEARNEY  Not [place]

ilable



                     history of          history of          (64761)



                           mental health             nicotine     



                           and substance             dependence     



                                         abuse codes (3      



                                         sources.)      

 

      



            Other      Smoking    Episodic   Completed  JAVIER CHOPRA ,  Not Avai

lable



                 complications   (tobacco)       MD              (02438)



                           of pregnancy              complicating     



                           (5 sources.)              pregnancy,     



                                         third     



                                         trimester     

 

      



            Other      Spotting   Episodic   Completed  JJ OVIEDO ,  Not Availa

ble



                 complications   complicating     DO              (90268)



                           of pregnancy              pregnancy,     



                           (1 source.)               third     



                                         trimester     

 

      



            External   Unspecified   no information  no information  CLAUDY GIN

[facility]  Not 

Available



                 Injury - Fall   fall, initial     , MD            (35112)



                           (1 source.)               encounter     

 

      



            Residual   Weeks of   no information  no information  CLAUDY CHUN 

 Not 

Available



                 codes;          gestation of     , MD            (21160)



                           unclassified              pregnancy not     



                           (1 source.)               specified     



                                                                                
                                                                                
                                                                                
                                                                                
                                                                                
                                                                         



Procedures

                      



    



              Procedure    Normalized Procedure  Procedure Result  Performer    

Facility



                                         Date    

 

    



              2015   Cytp c/v auto thin lyr  no information  no name      

[facility]



                           prepj scr mnl rescr       [facility] (31650)

 

    



                 DELIVERY OF PRODUCTS  no information  no name         Not Avail

able (26075)



                                         OF CONCEPTION, EXTE   

 

    



              2015   Iadna chlamydia  no information  no name      [facility]



                           trachomatis amplified     [facility] (21237)

 

    



              2015   Iadna trichomonas  no information  no name      [facility]



                           vaginalis direct probe    [facility] (32639)

 

    



              2015   Obtaining screen pap  no information  no name      Co

[facility]



                           smear                     [facility] (95012)

 

    



                 Repair Perineum Skin,  no information  no name         Not Avai

lable (19614)



                                         External Approach   

 

    



              2015   Urine pregnancy test  no information  no name      Co

[facility]



                           visual color cmprsn       [facility] (93529)

 

    



              2013   Urine pregnancy test  no information  no name      Co

[facility]



                           [facility]       [facility] (73345)

 

    



              2013   Urnls dip stick/tablet  no information  no name      

[facility]



                           rgnt auto w/o             [facility] (51123)



                                                                                
                                                                             



Immunizations

                      



    



              Normalized   Immunization Date  Notes        Care Provider  Facili

ty



                                         Immunization    

 

    



              DEPO PROVERA (150  2019   no information  no name      Commu

[facility]



                           MG/ML)                    [facility]



                                         (76766)

 

    



              no information  2019   no information  CLAUDY CHUN [address]  

[facility]



                                         [facility]



                                         (86843)

 

    



              no information  2019   no information  CLAUDY CHUN [address]6468  

[facility]



                                         [facility]



                                         (87776)



                                                                                
                 



Results

                      



     



            Test Name  Value      Interpretation  Reference Range  Date Time  Fa

cility



                     (Normalized)        (Normalized)        (Medline  



                                         Reference)  

 

 



                                         not yet



                                         categorized



                                         on 2020

 

     



                 OVERALL RESULT:  NOT DETECTED    (N)             [facility]



                                         (49899)

 

     



                 Control         Negative        (no code)       [facility]



                                         (22865)

 

     



                 Exp date        2022      (no code)       [facility]



                                         (98574)

 

     



                 Lot #           [id number]         (no code)       [facility]



                                         (11962)

 

     



                 SARS-CoV-2 RNA:  Negative        (N)             [facility]



                                         (48140)

 

     



                 Symptom         NOT GIVEN       (no code)       [facility]



                                         (65809)

 

 



                                         laboratory



                                         on 2020

 

     



                 SARS coronavirus  Negative        (N)             [facility]



                           RNA KEVON+probe [facility]



                           (Unsp spec)               [facility]



                                         (48944)

 

     



                 Specimen source  NOT GIVEN       (no code)       [facility]



                           Nom (Unsp spec)           [facility]



                                         (20967)

 

 



                                         not yet



                                         categorized



                                         on 2020

 

     



                 Exp date        Negative        (no code)       [facility]



                                         (65457)

 

     



                 Exp date        2021      (no code)       [facility]



                                         (37792)

 

     



                 Lot #           [id number]         (no code)       [facility]



                                         (16945)

 

 



                                         not yet



                                         categorized



                                         on 2020

 

     



                 Exp date        +~          (no code)       [facility]



                                         (39809)

 

     



                 Lot #           [id number]          (no code)       [facility]



                                         (81874)

 

     



                 RESULTS         Negative        (no code)       [facility]



                                         (23871)

 

 



                                         not yet



                                         categorized



                                         on 2019

 

     



                 Exp date        +~10/20         (no code)       [facility]



                                         (92199)

 

     



                 Lot #           [id number]         (no code)       [facility]



                                         (15364)

 

     



                 RESULTS         Negative        (no code)       [facility]



                                         (27236)

 

 



                                         venous blood



                                         hemoglobin



                                         measurement



                                         (mass/volume)



                                         on 2018

 

     



              Hemoglobin mass  16.8 g/dL    (H)          12.1 - 17.2 g/dL   [facility]ia 

[place]



                           conc (Bld)                [facility]



                                         (36701)

 

 



                                         urine



                                         urobilinogen



                                         measurement by



                                         automated test



                                         strip



                                         (mass/volume)



                                         on 2018

 

     



                 Urobilinogen    1               (no code)       [facility]



                           Test strip Qn             [facility]



                           ([facility]



                                         (17776)

 

 



                                         urine total



                                         bilirubin



                                         detection by



                                         test strip



                                         on 2018

 

     



                 Bilirubin Ql (U)  Negative        (no code)       [facility]



                                         (79441)

 

 



                                         urine protein



                                         assay by test



                                         strip,



                                         semi-quantitativ



                                         e



                                         on



                                         2018

 

     



                 Protein Test    1+              (*)             [facility] [place]



                           strip  ()              [facility]



                                         (06690)

 

 



                                         urine ph



                                         measurement by



                                         test strip



                                         on 2018

 

     



              pH Test strip  5 [pH]       (no code)    4.6 - 8 [pH]   [facility]



                           ([facility]



                                         (65069)

 

 



                                         urine nitrite



                                         detection by



                                         test strip



                                         on 2018

 

     



                 Nitrite Test    Negative        (no code)       [facility] [place]



                           strip Ql (U)              [facility]



                                         (40774)

 

 



                                         urine leukocyte



                                         esterase



                                         detection by



                                         dipstick



                                         on



                                         2018

 

     



                 Leukocyte       2+              (*)             [facility]



                           esterase Test             Hospital



                           strip Ql (U)              [facility]



                                         (82571)

 

 



                                         urine ketones



                                         detection by



                                         automated test



                                         strip



                                         on



                                         2018

 

     



                 Ketones         Negative        (no code)       [facility]



                           Automated test            Hospital



                           strip Ql (U)              [facility]



                                         (87828)

 

 



                                         urine glucose



                                         detection by



                                         automated test



                                         strip



                                         on



                                         2018

 

     



                 Glucose         Negative        (no code)       [facility]



                           Automated test            Hospital



                           strip Ql (U)              [facility]



                                         (71044)

 

 



                                         urine color



                                         determination



                                         on 2018

 

     



                 Color Nom (U)   YELLOW          (no code)       [facility]



                                         (17815)

 

 



                                         urine clarity



                                         determination



                                         on 2018

 

     



                 Clarity Nom (U)  CLEAR           (no code)       [facility]



                                         (15733)

 

 



                                         streptococcus



                                         pyogenes antigen



                                         detection



                                         on



                                         2018

 

     



                 S. pyogenes Ag  Negative        (no code)       [facility] (Throat)               [facility]



                                         (39677)

 

 



                                         squamous



                                         epithelial cells



                                         detection in



                                         urine sediment



                                         by light



                                         microscopy



                                         on 2018

 

     



                 Epithelial      no information  (*)             [facility]



                           cells.squamous            Hospital



                           LM Ql (Urine              [facility]



                           sed)                      (51780)

 

 



                                         specific gravity



                                         of urine by test



                                         strip



                                         on



                                         2018

 

     



                 Specific gravity  1.020           (no code)       [facility]



                           Relative Density          [facility]



                           ()                       [facility]



                                         (54169)

 

 



                                         serum or plasma



                                         urea



                                         nitrogen/creatin



                                         ine mass ratio



                                         on 2018

 

     



              Urea         12 mg/mg     (no code)    6 - 22 mg/mg   [facility]



                           nitrogen/Creatin          Hospital



                           ine mass ratio            [facility]



                                         (98975)

 

 



                                         serum or plasma



                                         urea nitrogen



                                         measurement



                                         (mass/volume)



                                         on 2018

 

     



              Urea nitrogen  9 mg/dL      (no code)    7 - 20 mg/dL   [facility]

i



                           mass [facility]



                                         (64048)

 

 



                                         serum or plasma



                                         total bilirubin



                                         measurement



                                         (mass/volume)



                                         on 2018

 

     



              Bilirubin mass  1.1 mg/dL    (H)          0.1 - 1.2 mg/dL   [facility]ia 

[facility]



                           [facility]



                                         ([id number])

 

 



                                         serum or plasma



                                         sodium



                                         measurement



                                         (moles/volume)



                                         on 2018

 

     



              Sodium molar  139 mmol/L   (no code)    135 - 145 mmol/L   [facility]

isti



                           [facility]



                                         ([id number])

 

 



                                         serum or plasma



                                         protein



                                         measurement



                                         (mass/volume)



                                         on 2018

 

     



              Protein mass  7.6 g/dL     (no code)    6.4 - 8.3 g/dL   Via [facility]



                                         ([id number])

 

 



                                         serum or plasma



                                         potassium



                                         measurement



                                         (moles/volume)



                                         on 2018

 

     



              Potassium molar  4.2 mmol/L   (no code)    3.7 - 5.2 mmol/L   Via 

[facility]



                                         ()

 

 



                                         serum or plasma



                                         glucose



                                         measurement



                                         (mass/volume)



                                         on 2018

 

     



              Glucose mass  93 mg/dL     (no code)    60 - 125 mg/dL   [facility]ia [facility]



                                         ([id number])

 

 



                                         serum or plasma



                                         creatinine



                                         measurement with



                                         calculation of



                                         estimated



                                         glomerular



                                         filtration rate



                                         on 2018

 

     



                 GFR/1.73 sq M   no information  (no code)       Via [facility]



                           non-[facility]



                           vol rate/area             ()



                                         (S/P/Bld)     

 

 



                                         serum or plasma



                                         creatinine



                                         measurement



                                         (mass/volume)



                                         on 2018

 

     



                 Creatinine mass  0.75 mg/dL      (no code)       Via [facility]



                                         ([id number])

 

 



                                         serum or plasma



                                         chloride



                                         measurement



                                         (moles/volume)



                                         on 2018

 

     



              Chloride molar  107 mmol/L   (no code)    95 - 106 mmol/L   Via [facility]



                                         ([id number])

 

 



                                         serum or plasma



                                         calcium



                                         measurement



                                         (mass/volume)



                                         on 2018

 

     



              Calcium mass  9.8 mg/dL    (no code)    8.5 - 10.2 mg/dL   [facility]ia [facility]



                                         ([id number])

 

 



                                         serum or plasma



                                         aspartate



                                         aminotransferase



                                         measurement



                                         (enzymatic



                                         activity/volume)



                                         on



                                         2018

 

     



              AST enzyme   27 U/L       (no code)    10 - 34 U/L   [facility]



                           act/[facility]



                                         ([id number])

 

 



                                         serum or plasma



                                         anion gap



                                         determination



                                         (moles/volume)



                                         on 2018

 

     



              Anion gap 3  8 mmol/L     (no code)    3 - 11 mmol/L   Via [facility]



                                         ([id number])

 

 



                                         serum or plasma



                                         alkaline



                                         phosphatase



                                         measurement



                                         (enzymatic



                                         activity/volume)



                                         on



                                         2018

 

     



              ALP enzyme   79 U/L       (no code)    44 - 147 U/L   [facility]



                           act/[facility]



                                         ([id number])

 

 



                                         serum or plasma



                                         albumin



                                         measurement



                                         (mass/volume)



                                         on 2018

 

     



              Albumin mass  4.5 g/dL     (no code)    3.4 - 5.4 g/dL   Via [facility]



                                         ([id number])

 

 



                                         serum or plasma



                                         alanine



                                         aminotransferase



                                         measurement



                                         (enzymatic



                                         activity/volume)



                                         on



                                         2018

 

     



              ALT enzyme   41 U/L       (no code)    4 - 40 U/L   Via [facility]



                           act/vol                   [facility]



                                         ([id number])

 

 



                                         mucus detection



                                         in urine



                                         sediment by



                                         light microscopy



                                         on



                                         2018

 

     



                 Mucus LM Ql     MODERATE        (*)             [facility]ia [facility]



                           (Urine sed)               [facility]



                                         ([id number])

 

 



                                         erythrocytes



                                         detection in



                                         urine sediment



                                         by light



                                         microscopy



                                         on 2018

 

     



                 RBC LM Ql (Urine  Negative        (no code)       [facility]ia [facility]



                           sed)                      [facility]



                                         ([id number])

 

 



                                         crystals



                                         detection in



                                         urine sediment



                                         by light



                                         microscopy



                                         on 2018

 

     



                 Crystals LM Ql  NONE            (no code)       [facility]ia [facility]



                           (Urine sed)               [facility]



                                         ([id number])

 

 



                                         complete



                                         urinalysis with



                                         reflex to



                                         culture



                                         on



                                         2018

 

     



                 Urinalysis      YES             (no code)       Via [facility]



                           Reflex Culture            [facility]



                           panel - Urine             ([id number])

 

 



                                         casts detection



                                         in urine



                                         sediment by



                                         light microscopy



                                         on



                                         2018

 

     



                 Casts LM Ql     NONE            (no code)       [facility]ia [facility]



                           (Urine sed)               [facility]



                                         (47443)

 

 



                                         carbon dioxide



                                         on 2018

 

     



              CO2 molar conc  24 mmol/L    (no code)    23 - 29 mmol/L   Via [facility]



                                         (81432)

 

 



                                         calcium



                                         measurement



                                         corrected for



                                         albumin



                                         on



                                         2018

 

     



              Calcium mass  9.4 mg/dL    (no code)    8.5 - 10.2 mg/dL   [facility]ia [facility]



                                         ([id number])

 

 



                                         blood



                                         neutrophils



                                         automated count



                                         (number/volume)



                                         on 2018

 

     



              Neutrophils Auto  7.2 10*3/uL  (no code)    1.7 - 7 10*3/uL   Via 

[facility]



                           #/vol (Bld)               [facility]



                                         (88548)

 

 



                                         blood



                                         monocytes/100



                                         leukocytes



                                         on 2018

 

     



              Monocytes/100  7 %          (no code)    2 - 8 %      [facility]ia [facility]



                           WBC Auto (Bld)            [facility]



                                         (47577)

 

 



                                         blood monocytes



                                         automated count



                                         (number/volume)



                                         on 2018

 

     



              Monocytes Auto  0.7 10*3/uL  (no code)    0.3 - 0.9    [facility]



                     #/vol (Bld)         10*3/uL             [facility]



                                         (54638)

 

 



                                         blood



                                         lymphocytes



                                         automated count



                                         (number/volume)



                                         on 2018

 

     



              Lymphocytes Auto  2.7 10*3/uL  (no code)    0.9 - 2.9    Via [facility]



                     #/vol (Bld)         10*3/uL             [facility]



                                         (50104)

 

 



                                         blood leukocytes



                                         automated count



                                         (number/volume)



                                         on 2018

 

     



              WBC Auto #/vol  10.9 10*3/uL  (no code)    3.5 - 10.5   [facility]ia [facility]

i



                     (Bld)               10*3/uL             [facility]



                                         ([id number])

 

 



                                         blood hematocrit



                                         (volume



                                         fraction)



                                         on



                                         2018

 

     



              Hematocrit Auto  48 %         (no code)    36.1 - 50.3 %   Via [facility]

isti



                           Volume Fraction           [facility]



                           (Bld)                     [facility]



                                         ([id number])

 

 



                                         blood



                                         erythrocytes



                                         automated count



                                         (number/volume)



                                         on 2018

 

     



              RBC Auto #/vol  5.43 10*6/uL  (no code)    4.2 - 6.1    [facility]ia [facility]



                     (d)               10*6/uL             [facility]



                                         (07332)

 

 



                                         bacteria



                                         detection in



                                         urine sediment



                                         by light



                                         microscopy



                                         on 2018

 

     



                 Bacteria LM Ql  MODERATE        (*)             [facility]ia [facility]



                           (Urine sed)               [facility]



                                         ([id number])

 

 



                                         automated urine



                                         sediment



                                         leukocyte count



                                         by microscopy



                                         (number/high



                                         power field)



                                         on 2018

 

     



                 WBC LM.HPF      no information  (*)             [facility]ia [facility]



                           #/area (Urine             Hospital



                           [facility])                      [facility]



                                         ([id number])

 

 



                                         automated urine



                                         sediment



                                         erythrocyte



                                         count by



                                         microscopy



                                         (number/high



                                         power field)



                                         on 2018

 

     



                 RBC LM.HPF      NONE            (no code)       [facility]ia [facility]



                           #/area (Urine             [facility])                      [facility]



                                         (53792)

 

 



                                         automated



                                         erythrocyte mean



                                         corpuscular



                                         volume



                                         on



                                         2018

 

     



              MCV Auto Entitic  88 fL        (no code)    80 - 100 fL   [facility]ia [facility]

sti



                           volume (RBC)              [facility]



                                         (71547)

 

 



                                         automated



                                         erythrocyte mean



                                         corpuscular



                                         hemoglobin



                                         concentration



                                         measurement



                                         (mass/volume)



                                         on 2018

 

     



              MCHC Auto mass  35 g/dL      (no code)    32 - 36 g/dL   Via [facility]

ti



                           conc (RBC)                [facility]



                                         (70556)

 

 



                                         automated



                                         erythrocyte mean



                                         corpuscular



                                         hemoglobin (mass



                                         per erythrocyte)



                                         on



                                         2018

 

     



              MCH Auto Entitic  31 pg        (no code)    27 - 31 pg   [facility]ia [facility]

ti



                           mass (RBC)                [facility]



                                         (91523)

 

 



                                         automated



                                         erythrocyte



                                         distribution



                                         width ratio



                                         on 2018

 

     



              Erythrocyte  13.1 %       (no code)    11.6 - 14.6 %   [facility]ia [facility]



                           distribution              Hospital



                           width Auto Ratio          [facility]



                           (RBC)                     ([id number])

 

 



                                         automated



                                         eosinophil count



                                         on



                                         2018

 

     



              Eosinophils Auto  0.2 10*3/uL  (no code)    0.05 - 0.5   [facility]ia [facility]

ti



                     #/vol (Bld)         10*3/uL             [facility]



                                         ([id number])

 

 



                                         automated blood



                                         platelet mean



                                         volume



                                         measurement



                                         on 2018

 

     



              Platelet mean  11.0 fL      (H)          7.2 - 11.7 fL   [facility]

ti



                           volume Auto               [facility]



                           (Bld)                     ([id number])

 

 



                                         automated blood



                                         platelet count



                                         (count/volume)



                                         on 2018

 

     



              Platelets Auto  200 10*3/uL  (no code)    150 - 450    [facility]



                     #/vol (Bld)         10*3/uL             [facility]



                                         (84349)

 

 



                                         automated blood



                                         neutrophils/100



                                         leukocytes



                                         on 2018

 

     



              Neutrophils/100  67 %         (no code)    40 - 60 %    [facility]

i



                           WBC Auto (d)            [facility]



                                         (14304)

 

 



                                         automated blood



                                         lymphocytes/100



                                         leukocytes



                                         on 2018

 

     



              Lymphocytes/100  25 %         (no code)    20 - 40 %    [facility]

i



                           WBC Auto (Bld)            [facility]



                                         ([id number])

 

 



                                         automated blood



                                         eosinophils/100



                                         leukocytes



                                         on 2018

 

     



              Eosinophils/100  2 %          (no code)    1 - 4 %      [facility]

i



                           WBC Auto (Bld)            [facility]



                                         (65217)

 

 



                                         automated blood



                                         basophils/100



                                         leukocytes



                                         on 2018

 

     



              Basophils/100  0 %          (no code)    0.5 - 1 %    [facility]



                           WBC Auto (Bld)            [facility]



                                         (17774)

 

 



                                         automated blood



                                         basophil count



                                         (count/volume)



                                         on 2018

 

     



              Basophils Auto  0.0 10*3/uL  (no code)    0 - 0.3 10*3/uL   Via 

[facility]



                           #/vol (Bld)               [facility]



                                         ([id number])

 

 



                                         laboratory



                                         on 2015

 

     



                 Microscopic     FOOTNOTE        (no code)       [facility]



                           observation Cyto          [facility]



                           stain.thin prep           [place]



                           Nom (Cvx)                 ([id number])



                                                                                
                                                                                
                                                                                
                                                                                
                                                                                
                                                                                
                                                                                
                                                                                
                                                                                
                                                                                
                                      



Vital Signs

                      



      



           Vital Sign  Value     Interpretation  Reference  Date Time  Care Prov

ider  

Facility



                     (Normalized)        (Normalized)        Range   

 

      



           Body height  180.34 cm  (no code)  cm        2013  BUCKY Morales

Pending sale to [facility]



                     [id number]          [id number] (Other        Health Center



                           Phone:                    of [facility]



                           [phone])            [place] ([id number])

 

      



           Body      98.6 [degF]  (no code)  97.8 - 99.0  2015  [facility]



              Temperature    [degF]       09:300500   75434        [facility]

[facility] ([id number])

 

      



           Body      97.9 [degF]  (no code)  97.8 - 99.0  2013  [facility]



              temperature    [degF]       12:130400   10939 (Other  [facility]

er



                           Phone:                    of [facility]



                           [phone])            [place] ([address])

 

      



           Body weight  112.04 kg  (no code)  kg        2015  [facility]



                     09:300500          55559               [facility] ([id number])

 

      



           Body weight  97.93 kg  (no code)  kg        [facility]

[facility]



                     12:0400          [address]112 (Other        Health Center



                           Phone:                    of [facility]



                           [phone])            [place] ([address])

 

      



           Height    180.34 cm  (no code)  cm        2015  [facility]



                     09:300500          25250               [facility] ([id number])



                                                                                
                                                



Interventions

          No Information                                                        
  



Plan of Treatment

                      



    



              Normalized Care  Care Detail  Care Activity Date  Care Provider  F

acility



                                         Activity    

 

    



              Patient Education  Bronchiolitis (DC)  no information  CLAUD[facility] [id number]  

[address]



                                         [facility]



                                         ([id number])

 

    



              Patient referral  no information  no information  CLAUDY CHUN [address]

[address]  

[address]



                                         [facility]



                                         ([id number])

 

    



              S. pyogenes Ag  no information  no information  CLAUDY CHUN [address]

[address]  [address]



                           IA.rapid Ql (Throat)      [facility]



                                         ([id number])



                                                                                
                  



Goals

                      



 



                           Patient Goal              Desired Goal

 

 



                           no information            no information



                                                                              



Social History

                      



   



                 Normalized Code  Original Code   Date            Value

 

   



                 Tobacco smoking status  Tobacco smoking status  no information 

 Smokes tobacco 

daily



                     NHIS                NHIS                (finding)

 

   



                 no information  no information  2016      Rarely Uses

 

   



                 no information  no information  2016      No

 

   



                 no information  no information  2019      Denies

 

   



                 no information  no information  2019      Current Everyda

y Smoker

 

   



                 no information  no information  2019      Cigarettes

 

   



                 Sex Assigned At Birth  Sex Assigned At Birth  no information  F

emale



                                                                                
                                                          



Functional Status

                      The data below is from unstructured sources            



                    Query                   Response                   Date Ranjeet

rded                

 

                          Patient Orientation                   Person          

          

Place                    

Time                    

Situation                    

                                        2016 3:58pm                



                                                                    



Mental Status

                      The data below is from unstructured sourcesNo Mental 
Status Information Available                                                    
                



Encounters

                      



    



              Encounter    Normalized Encounter  Encounter Diagnosis  Care Provi

mars  

Organization



                           Date                      Type   

 

    



              2020   [facility]  Acute pharyngitis,  SANDRO CARRERA

[facility]



                 CARE            unspecified     phone)          CARE (no phone)

 

    



              2020   [facility]  Generalized anxiety  ARIK AMRIT

 (no phone)  

[facility]



                           disorder                  (no phone)

 

    



              2020   [facility]  Encounter for  DAMIR DE JESUS (no  [facility]



                     surveillance of     phone)              (no phone)



                                         injectable  



                                         contraceptive  

 

    



              2020   [facility]  Major depressive  MARIJA ROSIE

SON (no  

[facility]



                     disorder, recurrent  phone)              (no phone)



                                         severe without  



                                         psychotic features  

 

    



              01-   [facility]  Major depressive  ARIK AMRIT (n

o phone)  

[facility]



                           disorder, recurrent       (no phone)



                                         severe without  



                                         psychotic features  

 

    



              2019   [facility]  Major depressive  MARIJA ROSIE

SON (no  

[facility]



                     disorder, recurrent  phone)              (no phone)



                                         severe without  



                                         psychotic features  

 

    



              2019   [facility]  Major depressive  MARIJA ROSIE

SON (no  

[facility]



                     disorder, recurrent  phone)              (no phone)



                                         severe without  



                                         psychotic features  

 

    



              2019   [facility]  Major depressive  MARIJA ROSIE

SON (no  

[facility]



                     disorder, recurrent  phone)              (no phone)



                                         severe without  



                                         psychotic features  

 

    



              2019   [facility]  Personal history of  CLAUDY MASON (no  

[facility]



                 -               other complications of  phone)          (no cristian

ne)



                           2019                pregnancy, childbirth  



                           -                         and the puerperium  



                                         2019   Emergency department  no information  (no phone)   [facility]

[facility] 

[place]



                     -                   patient visit       Hospital (no phone)



                                         2019   Emergency department  no information  no name      no

 organization name



                           -                         patient visit   



                                         2019   Emergency department  no information  BRANDEN HARMON

INS  no 

organization name



                     -                   patient visit       Work Phone: 



                           2019                [phone] 

 

    



              2019   Emergency department  no information  JEANETTE GONZALEZ 

Work  no 

organization name



                     -                   patient visit       Phone: [phone] 



                                         2019   Emergency department  no information  ERICA MOHAN (

no  no 

organization name



                     -                   patient visit       phone) 



                                         2018   Encounter by computer  no information  ARIKLULU MARCUS (no 

phone)  [facility]



                           link                      (no phone)

 

    



              2019   FQ visit new patient  Major depressive  ARIK AMRIT (

no phone)  

[facility]



                           disorder, recurrent       (no phone)



                                         severe without  



                                         psychotic features  

 

    



              2020   Nursing evaluation of  Fever, unspecified  CLAUDY EN

[facility] (no  

[facility]



                     patient and report   phone)              CARE (no phone)

 

    



              2018   Patient encounter  no information  no name      no or

ganization name

 

    



              2016   Patient encounter  no information  no name      no or

ganization name

 

    



              NEGATED      Patient encounter  no information  no name      no or

ganization name



                                         2016    

 

    



              NEGATED      Patient encounter  no information  no name      no or

ganization name



                                         2016   Patient encounter  no information  (no phone)   [facility]



                           procedure                 [facility] (no phone)

 

    



              2020   Patient encounter  no information  CLAUDY N CHUN (n

o  [facility]



                     procedure           phone) (no phone)   [facility] (no phone)

 

    



              2020   Patient encounter  no information  CLAUDY N CHUN (n

o  [facility]



                     procedure           phone) (no phone)   [facility] (no phone)

 

    



              01-   Patient encounter  no information  no name      no or

ganization name



                                         procedure   

 

    



              2019   Patient encounter  no information  no name      no or

ganization name



                                         procedure   

 

    



              2019   Patient encounter  no information  no name      no or

ganization name



                                         procedure   

 

    



              2019   Patient encounter  no information  no name      no or

ganization name



                                         procedure   

 

    



              2019   Patient encounter  no information  no name      no or

ganization name



                                         procedure   

 

    



              2019   Patient encounter  no information  no name      no or

ganization name



                                         procedure   

 

    



              2019   Patient encounter  no information  no name      no or

ganization name



                                         procedure   

 

    



              2019   Patient encounter  no information  no name      no or

ganization name



                                         procedure   

 

    



              10-   Patient encounter  no information  no name      no or

ganization name



                                         procedure   

 

    



              2017   Patient encounter  no information  no name      no or

ganization name



                                         procedure   

 

    



              2017   Patient encounter  no information  no name      no or

ganization name



                                         procedure   

 

    



              2016   Patient encounter  no information  no name      no or

ganization name



                           -                         procedure   



                                         2017   Patient encounter  no information  (no phone)   [facility]



                           procedure                 [facility] (no phone)

 

    



              2020   Telephone encounter  no information  CLAUDY CHUN (n

o  [facility] (no



                           phone)                    phone)

 

    



              2020   Telephone encounter  no information  CLAUDY CHUN (n

o  [facility] 

[facility]



                           phone) MARIJA HIGH    (no phone)



                                         (no phone) 

 

    



              2020   Telephone encounter  no information  [facility]n

[facility]  [facility]



                           phone)                    (no phone)

 

    



              2019   Telephone encounter  Major depressive  CLAUDY CHUN 

(no  [facility] 

[facility]



                     disorder, recurrent  phone)              (no phone)



                                         severe without  



                                         psychotic features  



                                                                                
                                                                                
                                                                                
                                                                                
                                                                                
                                                                              



Medical Equipment

                      The data below is from unstructured sourcesNo Medical 
Equipment Information available                                                 
                   



Payers

                      



 



                           Normalized Payer          Value

 

 



                           Unknown                   [id number]

[id number]



                                                                              



Evaluation note

                      



  



                     Note Type           Note                Facility

 

  



                     Evaluation          No Assessments Information Available  A

scension



                           note                      Via



                                         [facility]



                                         ([id number])



                                                                              



History general Narrative - Reported

                      



  



                     Note Type           Note                Facility

 

 



                                         History 



                                         general 



                                         Narrative 



                                         - Reported 

 

  



                                         Type

 

  



                           Medical                   HTN 



                                         History  

 

  



                           Medical                   [place] [facility] 



                                         History  

 

  



                           Medical                   Pre-eclampsia in pregnancy 



                                         History  

 

  



                           Medical                   Post-partum Depression 



                                         History  

 

  



                           Medical                   Postpartum depression 



                                         History  

 

  



                           Medical                   Postpartum depression 



                                         History  

 

  



                           Medical                   Anxiety, generalized 



                                         History  

 

  



                           Surgical                  No Surgical history informa

tion 



                                         History  

 

  



                           [facility]ospitaliz                Childbirth 



                                         ation  



                                         History  



                                                        [facility] ([id number])                                                     
                                                                   



Summary Purpose

          eClinicalWorks SubmissioneClinicalWorks SubmissioneClinicalWorks 
SubmissioneClinicalWorks SubmissioneClinicalWorks SubmissioneClinicalWorks 
SubmissioneClinicalWorks Submission                                             
             



Advance Directives

                      



                    Directive                   Response                   Recor

ded Date/Time       

         

 

                    Advance Directives                   No                   10

/11/16 6:18pm       

         

 

                    Health Care Power of                    No          

         10/11/16 

6:18pm                

 

                    Organ Donor                   Yes                   10/11/16

 6:18pm             

   

 

                    Resuscitation Status                   Full Code            

       10/11/16 

6:18pm                



            



                    Directive                   Response                   Recor

ded Date/Time       

         

 

                    Advance Directives                   No                    11:00am      

          

 

                    Health Care Power of                    No          

         16 

11:00am                

 

                    Organ Donor                   Yes                   16

 11:00am            

    

 

                    Resuscitation Status                   Full Code            

       16 

11:00am                



            



                    Directive                   Response                   Recor

ded Date/Time       

         

 

                    Advance Directives                   No                    9:34pm       

         

 

                    Health Care Power of                    No          

         16 

9:34pm                

 

                    Organ Donor                   Yes                   16

 9:34pm             

   

 

                    Resuscitation Status                   Full Code            

       16 

9:34pm                



            



                    Directive                   Response                   Recor

ded Date/Time       

         

 

                    Advance Directives                   No                    1:30am       

         

 

                    Health Care Power of                    No          

         16 

1:30am                

 

                    Organ Donor                   Yes                   16

 1:30am             

   

 

                    Resuscitation Status                   Full Code            

       16 

1:30am                



            



                    Directive                   Response                   Recor

ded Date/Time       

         

 

                    Advance Directives                   No                    1:30am       

         

 

                    Health Care Power of                    No          

         16 

1:30am                

 

                    Organ Donor                   Yes                   16

 1:30am             

   



            



                    Directive                   Response                   Recor

ded Date/Time       

         

 

                    Advance Directives                   No                    2:34pm       

         

 

                    Health Care Power of                    No          

         16 

2:34pm                

 

                    Organ Donor                   Yes                   16

 2:34pm             

   

 

                    Resuscitation Status                   Full Code            

       16 

2:34pm                



            



                    Directive                   Response                   Recor

ded Date/Time       

         

 

                    Advance Directives                   No                    2:34pm       

         

 

                    Health Care Power of                    No          

         16 

2:34pm                

 

                    Organ Donor                   Yes                   16

 2:34pm             

   



            



                    Directive                   Response                   Recor

ded Date/Time       

         

 

                    Advance Directives                   No                   12

/04/15 11:27am      

          

 

                    Resuscitation Status                   Full Code            

       12/04/15 

11:27am                



            



                    Directive                   Response                   Recor

ded Date/Time       

         

 

                    Advance Directives                   No                    9:12pm       

         

 

                    Resuscitation Status                   Full Code            

       16 

9:12pm                



            



                    Directive                   Response                   Recor

ded Date/Time       

         

 

                    Advance Directives                   No                   10

/30/17 9:29pm       

         

 

                    Health Care Power of                    No          

         10/30/17 

9:29pm                

 

                    Organ Donor                   Yes                   10/30/17

 9:29pm             

   

 

                    Resuscitation Status                   Full Code            

       10/30/17 

9:29pm                



            



                    Directive                   Response                   Recor

ded Date/Time       

         

 

                    Advance Directives                   No                    10:49am      

          

 

                    Health Care Power of                    No          

         18 

10:49am                

 

                    Organ Donor                   Yes                   18

 10:49am            

    

 

                    Resuscitation Status                   Full Code            

       18 

10:49am                



            



                    Directive                   Response                   Recor

ded Date/Time       

         

 

                    Advance Directives                   No                    8:32am       

         

 

                    Health Care Power of                    No          

         19 

8:32am                

 

                    Organ Donor                   Yes                   19

 8:32am             

   

 

                    Resuscitation Status                   Full Code            

       19 

8:32am                



            



                    Directive                   Response                   Recor

ded Date/Time       

         

 

                    Advance Directives                   No                    9:12am       

         

 

                    Health Care Power of                    No          

         19 

9:12am                

 

                    Organ Donor                   Yes                   19

 9:12am             

   

 

                    Resuscitation Status                   Full Code            

       19 

9:12am                



            



                    Advance Directive                   Response                

   Recorded 

Date/Time                

 

                    Advance Directives                   No                   No

2019 

5:45am                

 

                    Health Care Power of                    No          

         2019 5:45am                

 

                    Organ Donor                   Yes                   2019 5:45am  

              

 

                    Resuscitation Status                   Full Code            

       2019 5:45am                



                                                                    



Discharge Instructions

          No hospital discharge instructions.No hospital discharge 
instructions.No hospital discharge instructions.No hospital discharge 
instructions.Current inpatient/outpatient. Discharge instructions are currently 
unavailable.Current inpatient/outpatient. Discharge instructions are currently 
unavailable.                          

Patient Instructions              

Physician Instructions              

              

New, Converted or Re-Newed RX: Other (nifedipine transmitted, hydrocodone on    
           

chart)              

Goal/Follow Up Appt:               

Follow up with Dr. Mccollum in 1 week for blood pressure check.              

Follow up with Dr. Mccollum in 6 weeks for postpartum visit.              

[facility]:               

Headache, upper abdominal pain, vision changes, shortness of breath,            
  

increased vaginal bleeding, dizziness, chest pain, foul vaginal discharge,      
        

fever              

Discharge Diet: Regular Diet              

Activity as Tolerated: Yes (avoid strenuous activity x 6 weeks)              

              

            No hospital discharge instructions.No hospital discharge 
instructions.No hospital discharge instructions.No hospital discharge 
instruction information available.No hospital discharge instruction information 
available.No hospital discharge instruction information available.No hospital 
discharge instruction information available.                                    
                                



Chief Complaint and Reason for Visit

                      



                          Chief Complaint                   Cough/Cold/Flu Sympt

oms                

 

                          Reason for Visit                   Urinary tract infec

tion                    

Upper respiratory infection                                  



            



                          Chief Complaint                   Abdominal/GI Problem

s                

 

                          Reason for Visit                   [id number]   

                 

[id number]                                  



            



                          Chief Complaint                   Ear Problems        

        

 

                          Reason for Visit                   Upper respiratory i

nfection                  

  

Left ear pain                                  



            



                          Chief Complaint                   Cough/Cold/Flu Sympt

oms                

 

                          Reason for Visit                   [id number]     

           



                                                          



Additional Source Comments

          This clinical document has been generated using [employer] 
software that has been certified by the Office of the National Coordinator for 
Health Information Technology (ONC 15.99.04.3023.Diam.31.00.0.175059) and the 
National Committee for  (NCQA, as an eMeasure certified 
technology).            

            

FOR RECORDS PERTAINING TO PATIENTS WHO ARE OR HAVE BEEN ENROLLED IN A CHEMICAL D
EPENDENCY/SUBSTANCE ABUSE PROGRAM, SOME INFORMATION MAY BE OMITTED. This clinica
l summary was aggregated from multiple sources.  Caution should be exercised in 
using it in the provision of clinical care. This summary normalizes information 
from multiple sources, and as a consequence, information in this document may ma
terially change the coding, format and clinical context of patient data. In esperanza
tion, data may be omitted in some cases. CLINICAL DECISIONS SHOULD BE BASED ON T
HE PRIMARY CLINICAL RECORDS. [employer] provides no warranty or guara
ntee of the accuracy or completeness of information in this document.The followi
ng information is based on time limited clinical information            



                                        UNRECOGNIZED CONTENT PROVIDED BELOW FOR 

UNRECOGNIZED SECTION REASON FOR VISIT   

             

 

                                                         



[facility] [place]            



                                        UNRECOGNIZED CONTENT PROVIDED BELOW FOR 

UNRECOGNIZED SECTION MEDICAL (GENERAL) 

HISTORY                

 

                                                         



            



                    Type                   Description                   Date   

             

 

                    Medical History                   HTN                       

             

 

                    Medical History                   [place] [facility]          

                    

      

 

                    Medical History                   Pre-eclampsia in pregnancy

                    

                

 

                    Medical History                   Post-partum Depression    

                    

            

 

                    Medical History                   Postpartum depression     

                    

           

 

                    Surgical History                   No know Surgical history 

                    

               

 

                    Hospitalization History                   Childbirth

## 2020-05-25 NOTE — XMS REPORT
[facility]

                             Created on: 2020



Jr Rico

External Reference #: [id number]

: 1990

Sex: Female



Demographics





                          Address                   [address] [place]  [address]

 

                          Preferred Language        Unknown

 

                          Marital Status            Unknown

 

                          Holiness Affiliation     Unknown

 

                          Race                      Unknown

 

                          Ethnic Group              Unknown





Author





                          Author                    Jr Lester

 

                          Organization              [facility]

 

                          Address                   [address] [place]  [address]



 

                          Phone                     [phone]







Care Team Providers





                    Care Team Member Name Role                Phone

 

                    BUCKY Lester    Unavailable         [phone]







PROBLEMS





          Type      Condition ICD9-CM Code TTU05-CO Code Onset Dates Condition S

tatus SNOMED 

Code

 

                          Problem                   Severe episode of recurrent 

major depressive disorder, without psychotic

features                  F33.2                     Active       [id number]

 

          Problem   Personality disorder           F60.9               Active   

 [id number]

 

          Problem   History of gestational hypertension           Z87.59        

      Active    [id number]

 

          Problem   Generalized anxiety disorder           F41.1               A

ctive    [id number]

 

          Problem   Acute seasonal allergic rhinitis, unspecified trigger       

    J30.2               Active    

[id number]







ALLERGIES

No Information



ENCOUNTERS





                Encounter       Location        Date            Diagnosis

 

                    [place] [address] [address]  [id number] [place] [id number]                                      

 

                          [facility]     [id number]

[address] [place] [address]

                                        Generalized anxiety disorder

 F41.1 ; Severe episode of recurrent 

major depressive disorder, without psychotic features F33.2 and Personality 
disorder F60.9

 

                          [facility]     [id number]

[address] [place] [address]

                                         

 

                          [facility]  [address] [id number]

[address] [place] [address] 

[phone]                25 Mar, 2020              Fever of undetermined origin

 R50.9

 

                          [facility]     [address] [facility] [id number]

[address] [place] [address]

                          25 Mar, 2020               

 

                          [facility]     [address] [id number]

[address] [place] [phone]

                          25 Mar, 2020               

 

                          [facility]  [address] [id number]

[address] [place] [address] 

[phone]                20 Mar, 2020              Sore throat J02.9 and Influe

nza A J10.1

 

                          [place] [address] [facility] [id number]

[address] [place] [address]

                          20 Mar, 2020               

 

                    [facility] [place] [id number] [place] [phone] 20 Mar, 

2020                                     

 

                          [place] [address] [facility] [id number]

[address] [place] [phone]

                          20 Mar, 2020               

 

                          [place] [address] [place] [id number]

[address] [place] [address]

                                        Encounter for Depo-Provera c

ontraception Z30.42

 

                          [place] [address] [id number]

[address] [place] [address]

                                        Severe episode of recurrent 

major depressive disorder, without 

psychotic features F33.2 and Generalized anxiety disorder F41.1

 

                          [place] [id number]

[address] [place] [phone]

                          15 Bird, 2020              Severe episode of recurrent 

major depressive disorder, without 

psychotic features F33.2 ; Generalized anxiety disorder F41.1 and Personality 
disorder F60.9

 

                          [place] [address] [id number]

[address] [place] [address]

                          26 Dec, 2019              Severe episode of recurrent 

major depressive disorder, without 

psychotic features F33.2 and Generalized anxiety disorder F41.1

 

                          [place] [address] [place] [address] [id number]

[address] [place] [address]

                          16 Dec, 2019              Severe episode of recurrent 

major depressive disorder, without 

psychotic features F33.2 ; Generalized anxiety disorder F41.1 and Personality 
disorder F60.9

 

                          [place] [address] [id number]

[address] [place] [address]

                                        Severe episode of recurrent 

major depressive disorder, without 

psychotic features F33.2 and Generalized anxiety disorder F41.1

 

                          [place] [address] [id number]

[address] [place] [address]

                                        Severe episode of recurrent 

major depressive disorder, without 

psychotic features F33.2

 

                          [place] [address] [id number]

[address] [place] [address]

                          14 2019              Severe episode of recurrent 

major depressive disorder, without 

psychotic features F33.2

 

                          [facility]     [id number] [place] [address]

                                        History of gestational hyper

tension Z87.59 ; Severe episode of 

recurrent major depressive disorder, without psychotic features F33.2 ; Birth 
control counseling Z30.09 and Encounter for Depo-Provera contraception Z30.42

 

                          [facility]     [address] [id number]

[address] [place] [address]

                                         

 

                          [facility]     [address] [address]

                                         

 

                          [facility]  [id number] [place] [address] 

[address]                              Acute seasonal allergic rhin

itis, unspecified trigger 

J30.2 and Bronchitis J40

 

                          [facility]     [id number]

[address] [place] [address]

                          15 Aug, 2017              Encounter for Depo-Provera c

ontraception Z30.42

 

                          [place] [address] [id number]

[address] [place] [address]

                          10 May, 2017              Encounter for Depo-Provera c

ontraception Z30.42

 

                          [place] [address] [address]

                          10 May, 2017               

 

                          [facility]     [address] [phone]

                                        Encounter for Depo-Provera c

ontraception Z30.42 ; History of 

gestational hypertension Z87.59 and Postpartum depression F53

 

                          [facility]     [address] [id number]

[address] [place] [address]

                                         

 

                          [facility]     [id number] [place] [address]

                                        Postpartum depression F53 ; 

Patient is a currently breast-feeding 

mother Z39.1 and Anxiety, generalized F41.1

 

                          [facility]     [address] [id number]

[address] [place] [address]

                                         

 

                          [facility]     [address] [id number]

[address] [place] [address]

                                         

 

                          [facility]     [address] [facility] [id number]

[address] [place] [address]

                                         

 

                          [facility]     [address] [id number]

[address] [place] [address]

                                         

 

                          [facility]     [address] [id number]

[address] [place] [phone]

                                        Postpartum depression F53 an

d Anxiety, generalized F41.1

 

                          [facility]     [address] [facility] [id number]

[address] [place] [address]

                                        Anxiety, generalized F41.1 a

nd Postpartum depression F53

 

                          [facility]     [address] [facility] [id number]

[address] [place] [address]

                                        Postpartum depression F53 an

d Generalized anxiety disorder F41.1

 

                          [facility]     [address] [facility] [id number]

[address] [place] [address]

                                        Postpartum depression F53

 

                          [facility]     [address] [facility] [id number]

[address] [place] [address]

                                        Postpartum depression F53 an

d Patient is a currently breast-feeding

mother Z39.1

 

                          [facility]     [address] [facility] [id number]

[address] [place] [address]

                          27 Dec, 2016               

 

                          [facility]  [address] [id number]

[address] [place] [address] 

[phone]                24 Dec, 2016               

 

                          [facility]     [address] [facility] [id number]

[address] [place] [address]

                          23 Dec, 2016              Chronic hypertension affecti

ng pregnancy O10.919

 

                          [facility]     [address] [facility] [id number]

[address] [place] [address]

                          23 Dec, 2016               

 

                          [facility]     [address] [facility] [id number]

[address] [place] [phone]

                          14 Dec, 2016              Proteinuria affecting pregna

ncy in third trimester O12.13 ; Chronic

hypertension affecting pregnancy O10.919 and 36 weeks gestation of pregnancy 
Z3A.36

 

                          [facility]     [address] [facility] [id number]

[address] [place] [address]

                          09 Dec, 2016              Elevated blood pressure affe

cting pregnancy in first trimester, 

antepartum O13.1

 

                          [facility]     [address] [facility] [id number]

[address] [place] [address]

                          07 Dec, 2016              Prenatal care, first pregnan

cy in third trimester Z34.03 ; Chronic 

hypertension affecting pregnancy O10.919 ; 35 weeks gestation of pregnancy 
Z3A.35 and STD exposure Z20.2

 

                          [place] [address] [facility] [id number]

[address] [place] [address]

                          30 2016              Proteinuria affecting pregna

ncy in third trimester O12.13 ; Chronic

hypertension affecting pregnancy O10.919 ; Prenatal care, first pregnancy in 
third trimester Z34.03 and 34 weeks gestation of pregnancy Z3A.34

 

                          [place] [address] [facility] [id number]

[address] [place] [address]

                                         

 

                          [place] [address] [facility] [id number]

[address] [place] [address]

                                        Proteinuria affecting pregna

ncy in third trimester O12.13 ; 33 

weeks gestation of pregnancy Z3A.33 and Chronic hypertension affecting pregnancy
O10.919

 

                          [place] [address] [facility] [id number]

[address] [place] [address]

                          16 2016               

 

                          [place] [address] [facility] [id number]

[address] [place] [address]

                          15 2016               

 

                          [place] [address] [facility] [id number]

[address] [place] [address]

                                        Third trimester pregnancy Z3

3.1 ; 31 weeks gestation of pregnancy 

Z3A.31 ; Abnormal quad screen O28.0 ; Proteinuria affecting pregnancy in third 
trimester O12.13 and Encounter for immunization Z23

 

                          [place] [address] [facility] [id number]

[address] [place] [address]

                          21 Oct, 2016               

 

                          [place] [address] [facility] [id number]

[address] [place] [address]

                          18 Oct, 2016              Proteinuria affecting pregna

ncy in third trimester O12.13 ; Third 

trimester bleeding O46.93 ; Unspecified abdominal pain R10.9 ; Other specified 
pregnancy related conditions, unspecified trimester O26.899 and 28 weeks 
gestation of pregnancy Z3A.28

 

                          [place] [address] [facility] [id number]

[address] [place] [address]

                          13 Oct, 2016               

 

                          [place] [address] [facility] [id number]

[address] [place] [address]

                          12 Oct, 2016              Syncope, unspecified syncope

 type R55 ; Hypertension affecting 

pregnancy in second trimester O16.2 ; Proteinuria affecting pregnancy in second 
trimester O12.12 and 27 weeks gestation of pregnancy Z3A.27

 

                          [place] [address] [facility] [id number]

[address] [place] [address]

                          07 Oct, 2016              Diabetes mellitus screening 

Z13.1 ; Pregnancy, first, second 

trimester Z34.02 and 27 weeks gestation of pregnancy Z3A.27

 

                          [place] [address] [facility] [id number]

[address] [place] [address]

                          04 Oct, 2016              Hypertension affecting pregn

roro in second trimester O16.2 and 

Proteinuria affecting pregnancy in second trimester O12.12

 

                          [place] [address] [facility] [id number]

[address] [place] [address]

                          04 Oct, 2016              Hypertension affecting pregn

roro in second trimester O16.2 and 

Proteinuria affecting pregnancy in second trimester O12.12

 

                          [place] [address] [facility] [id number]

[address] [place] [address]

                          03 Oct, 2016              Proteinuria affecting pregna

ncy in second trimester O12.12 and 

Hypertension affecting pregnancy in second trimester O16.2

 

                          [place] [address] [facility] [id number]

[address] [place] [address]

                          03 Oct, 2016              Hypertension affecting pregn

roro in second trimester O16.2

 

                          [place] [id number]

[address] [place] [address]

                          03 Oct, 2016              Hypertension affecting pregn

roro in second trimester O16.2

 

                          [place] [address] [id number]

[address] [place] [address]

                          23 Sep, 2016              Pregnancy, first, second tri

mester Z34.02 ; 25 weeks gestation of 

pregnancy Z3A.25 and Encounter for immunization Z23

 

                          [place] [id number]

[address] [place] [address]

                          08 Sep, 2016              Hypertension affecting pregn

roro in second trimester O16.2 ; 

Proteinuria affecting pregnancy in second trimester O12.12 and 22 weeks 
gestation of pregnancy Z3A.22

 

                          [place] [address] [address]

                          01 Sep, 2016              21 weeks gestation of pregna

ncy Z3A.21 ; Proteinuria affecting 

pregnancy in second trimester O12.12 ; Hypertension affecting pregnancy in 
second trimester O16.2 and Blurry vision, left eye H53.8

 

                          [place] [id number]

[address] [place] [address]

                          29 Aug, 2016              Pregnancy, first, second tri

mester Z34.02 ; Proteinuria affecting 

pregnancy in third trimester O12.13 ; Hypertension affecting pregnancy, second 
trimester O16.2 ; 21 weeks gestation of pregnancy Z3A.21 and Evaluate anatomy 
not seen on prior sonogram Z36

 

                          [place] [id number]

[address] [place] [address]

                          26 Aug, 2016              Pregnancy, first, second tri

mester Z34.02 ; Hypertension affecting 

pregnancy, second trimester O16.2 ; Proteinuria affecting pregnancy in third 
trimester O12.13 ; 21 weeks gestation of pregnancy Z3A.21 and Evaluate anatomy 
not seen on prior sonogram Z36

 

                          [place] [id number]

[address] [place] [address]

                          04 Aug, 2016              Abnormal quad screen O28.0 a

nd 17 weeks gestation of pregnancy 

Z3A.17

 

                          [place] [address] [id number]

[address] [place] [address]

                                        Normal pregnancy, first Z34.

00 and 17 weeks gestation of pregnancy 

Z3A.17

 

                          [facility]     [address] [id number]

[address] [place] [address]

                                        Normal pregnancy, first Z34.

00 and 12 weeks gestation of pregnancy 

Z3A.12

 

                          [facility]     [address] [facility] [id number]

[address] [place] [address]

                          27 May, 2016              Fetal heart tones not heard 

O76 ; 20 weeks gestation of pregnancy 

Z3A.20 and Pregnancy, first, second trimester Z34.02

 

                          [facility]     [address] [facility] [id number]

[address] [place] [address]

                          18 May, 2016               

 

                          [facility]     [address] [facility] [id number]

[address] [place] [address]

                          17 May, 2016              Encounter for pregnancy test

, result positive Z32.01

 

                          [facility]  [address] [facility] [id number]

[address] [place] [address] 

[address]                              Low back pain M54.5 ; Sore t

hroat J02.9 ; Acute right 

lower quadrant pain R10.31 and Cough R05

 

                          [facility]     [address] [id number]

[address] [place] [address]

                                         

 

                          [facility]     [address] [facility] [id number]

[address] [place] [address]

                                         

 

                          [facility]     [address] [id number]

[address] [place] [phone]

                                         

 

                          [facility]     [address] [facility] [id number]

[address] [place] [address]

                                         

 

                          [facility]     [address] [facility] [id number]

[address] [place] [phone]

                                         

 

                          [facility]     [address] [facility] [id number]

[address] [place] [phone]

                                         

 

                          [facility]     [address] [facility] [id number]

[address] [place] [phone]

                                         

 

                          [facility]     [address] [id number]

[address] [place] [address]

                                         

 

                          [facility]     [address] [id number]

[address] [place] [address]

                          31 Dec, 2014               

 

                          [facility]     [address] [id number]

[address] [place] [phone]

                          31 Dec, 2014               

 

                          [facility]     [address] [id number]

[address] [place] [phone]

                          22 Oct, 2013               

 

                          [facility]     [address] [id number]

[address] [place] [id number]

                          22 Oct, 2013               

 

                          [facility]     [address] [id number]

[address] [place] [id number]

                          15 Oct, 2013               

 

                          [facility]     [address] [id number]

[address] [place] [id number]

                          15 Oct, 2013               

 

                          [facility]     [address] [id number]

[address] [place] [id number]

                          08 Oct, 2013               

 

                          [facility]     [address] [id number]

[address] [place] [id number]

                          05 Oct, 2013               

 

                          [facility]     [address] [id number]

[address] [place] [id number]

                          04 Oct, 2013               

 

                          [facility]     [address] [id number]

[address] [place] [id number]

                          02 Oct, 2013               

 

                          [facility]     [address] [id number]

[address] [place] [phone]

                          02 Oct, 2013               

 

                          [facility]     [address] [id number]

[address] [place] [phone]

                          27 Aug, 2013               

 

                          [facility]     [address] [id number]

[address] [place] [phone]

                          22 Aug, 2013               







IMMUNIZATIONS

No Known Immunizations



SOCIAL HISTORY

Never Assessed



REASON FOR VISIT





PLAN OF CARE





VITAL SIGNS





MEDICATIONS

Unknown Medications



RESULTS

No Results



PROCEDURES

No Known procedures



INSTRUCTIONS





MEDICATIONS ADMINISTERED

No Known Medications



MEDICAL (GENERAL) HISTORY





                    Type                Description         Date

 

                    Medical History     HTN                  

 

                    Medical History     [place] [facility]     

 

                    Medical History     Pre-eclampsia in pregnancy  

 

                    Medical History     Post-partum Depression  

 

                    Medical History     Postpartum depression  

 

                    Medical History     Postpartum depression  

 

                    Medical History     Anxiety, generalized  

 

                    Surgical History    No Surgical history information  

 

                    Hospitalization History Childbirth

## 2020-05-25 NOTE — DIAGNOSTIC IMAGING REPORT
Portable erect AP chest at 12:20.



Indication:  Cough, fever



The heart size is within normal limits and stable when compared

to 10/30/2017. The lungs are clear. There is no evidence for

failure, pneumonia or for pleural effusion. The mediastinum is

not widened. The osseous structures are intact.



Impression:  There is no evidence for active disease.



Dictated by: 



  Dictated on workstation # [id number]

## 2020-05-25 NOTE — XMS REPORT
[facility]

                             Created on: 2020



Jr Rico

External Reference #: [id number]

: 1990

Sex: Female



Demographics





                          Address                   [address] [place]  [address]

 

                          Preferred Language        Unknown

 

                          Marital Status            Unknown

 

                          Caodaism Affiliation     Unknown

 

                          Race                      Unknown

 

                          Ethnic Group              Unknown





Author





                          Author                    Jr Lester

 

                          Organization              [facility]

 

                          Address                   [address] [place]  [address]



 

                          Phone                     [phone]







Care Team Providers





                    Care Team Member Name Role                Phone

 

                    BUCKY Lester    Unavailable         [phone]







PROBLEMS





          Type      Condition ICD9-CM Code EFE60-KS Code Onset Dates Condition S

tatus SNOMED 

Code

 

                          Problem                   Severe episode of recurrent 

major depressive disorder, without psychotic

features                  F33.2                     Active       [id number]

 

          Problem   Personality disorder           F60.9               Active   

 [id number]

 

          Problem   History of gestational hypertension           Z87.59        

      Active    [id number]

 

          Problem   Generalized anxiety disorder           F41.1               A

ctive    [id number]

 

          Problem   Acute seasonal allergic rhinitis, unspecified trigger       

    J30.2               Active    

[id number]







ALLERGIES

No Information



ENCOUNTERS





                Encounter       Location        Date            Diagnosis

 

                    [place] [address]  [id number] [place] [phone]                                      

 

                          [facility]     [id number]

[address] [place] [address]

                                        Generalized anxiety disorder

 F41.1 ; Severe episode of recurrent 

major depressive disorder, without psychotic features F33.2 and Personality 
disorder F60.9

 

                          [facility]     [id number]

[address] [place] [phone]

                                         

 

                          [facility]  [id number]

[address] [place] [address] 

[phone]                25 Mar, 2020              Fever of undetermined origin

 R50.9

 

                          [facility]     [address] [facility] [id number]

[address] [place] [phone]

                          25 Mar, 2020               

 

                          [facility]     [id number]

[address] [place] [phone]

                          25 Mar, 2020               

 

                          [facility]  [id number]

[address] [place] [address] 

[phone]                20 Mar, 2020              Sore throat J02.9 and Influe

nza A J10.1

 

                          [place] [address] [address] [facility] [id number]

[address] [place] [phone]

                          20 Mar, 2020               

 

                    [facility] [place] [id number] [place] [phone] 20 Mar, 

2020                                     

 

                          [place] [address] [facility] [id number]

[address] [place] [phone]

                          20 Mar, 2020               

 

                          [place] [id number]

[address] [place] [phone]

                                        Encounter for Depo-Provera c

ontraception Z30.42

 

                          [place] [id number]

[address] [place] [phone]

                                        Severe episode of recurrent 

major depressive disorder, without 

psychotic features F33.2 and Generalized anxiety disorder F41.1

 

                          [place] [id number]

[address] [place] [phone]

                          15 Bird, 2020              Severe episode of recurrent 

major depressive disorder, without 

psychotic features F33.2 ; Generalized anxiety disorder F41.1 and Personality 
disorder F60.9

 

                          [place] [id number]

[address] [place] [phone]

                          26 Dec, 2019              Severe episode of recurrent 

major depressive disorder, without 

psychotic features F33.2 and Generalized anxiety disorder F41.1

 

                          [place] [id number]

[address] [place] [phone]

                          16 Dec, 2019              Severe episode of recurrent 

major depressive disorder, without 

psychotic features F33.2 ; Generalized anxiety disorder F41.1 and Personality 
disorder F60.9

 

                          [place] [id number]

[address] [place] [phone]

                                        Severe episode of recurrent 

major depressive disorder, without 

psychotic features F33.2 and Generalized anxiety disorder F41.1

 

                          [place] [id number]

[address] [place] [phone]

                                        Severe episode of recurrent 

major depressive disorder, without 

psychotic features F33.2

 

                          [place] [id number]

[address] [place] [phone]

                          14 2019              Severe episode of recurrent 

major depressive disorder, without 

psychotic features F33.2

 

                          [facility]     [id number] [place] [address]

                                        History of gestational hyper

tension Z87.59 ; Severe episode of 

recurrent major depressive disorder, without psychotic features F33.2 ; Birth 
control counseling Z30.09 and Encounter for Depo-Provera contraception Z30.42

 

                          [facility]     [id number]

[address] [place] [phone]

                                         

 

                          [facility]     [address] [address]

                                         

 

                          [facility]  [id number] [place] [address] 

[phone]                              Acute seasonal allergic rhin

itis, unspecified trigger 

J30.2 and Bronchitis J40

 

                          [facility]     [id number]

[address] [place] [phone]

                          15 Aug, 2017              Encounter for Depo-Provera c

ontraception Z30.42

 

                          [place] [id number]

[address] [place] [phone]

                          10 May, 2017              Encounter for Depo-Provera c

ontraception Z30.42

 

                          [place] [address] [address]

                          10 May, 2017               

 

                          [facility]     [address] [address]

                                        Encounter for Depo-Provera c

ontraception Z30.42 ; History of 

gestational hypertension Z87.59 and Postpartum depression F53

 

                          [facility]     [id number]

[address] [place] [phone]

                                         

 

                          [facility]     [id number] [place] [address]

                                        Postpartum depression F53 ; 

Patient is a currently breast-feeding 

mother Z39.1 and Anxiety, generalized F41.1

 

                          [facility]     [address] [id number]

[address] [place] [address]

                                         

 

                          [facility]     [address] [id number]

[address] [place] [phone]

                                         

 

                          [facility]     [address] [facility] [id number]

[address] [place] [address]

                                         

 

                          [facility]     [address] [id number]

[address] [place] [phone]

                                         

 

                          [facility]     [address] [id number]

[address] [place] [phone]

                                        Postpartum depression F53 an

d Anxiety, generalized F41.1

 

                          [facility]     [address] [facility] [id number]

[address] [place] [phone]

                                        Anxiety, generalized F41.1 a

nd Postpartum depression F53

 

                          [facility]     [address] [facility] [id number]

[address] [place] [phone]

                                        Postpartum depression F53 an

d Generalized anxiety disorder F41.1

 

                          [facility]     [address] [facility] [id number]

[address] [place] [phone]

                                        Postpartum depression F53

 

                          [facility]     [address] [facility] [id number]

[address] [place] [phone]

                                        Postpartum depression F53 an

d Patient is a currently breast-feeding

mother Z39.1

 

                          [facility]     [address] [facility] [id number]

[address] [place] [phone]

                          27 Dec, 2016               

 

                          [facility]  [address] [id number]

[address] [place] [address] 

[phone]                24 Dec, 2016               

 

                          [facility]     [address] [facility] [id number]

[address] [place] [address]

                          23 Dec, 2016              Chronic hypertension affecti

ng pregnancy O10.919

 

                          [facility]     [address] [facility] [id number]

[address] [place] [address]

                          23 Dec, 2016               

 

                          [facility]     [address] [facility] [id number]

[address] [place] [phone]

                          14 Dec, 2016              Proteinuria affecting pregna

ncy in third trimester O12.13 ; Chronic

hypertension affecting pregnancy O10.919 and 36 weeks gestation of pregnancy 
Z3A.36

 

                          [facility]     [address] [facility] [id number]

[address] [place] [phone]

                          09 Dec, 2016              Elevated blood pressure affe

cting pregnancy in first trimester, 

antepartum O13.1

 

                          [facility]     [address] [facility] [id number]

[address] [place] [phone]

                          07 Dec, 2016              Prenatal care, first pregnan

cy in third trimester Z34.03 ; Chronic 

hypertension affecting pregnancy O10.919 ; 35 weeks gestation of pregnancy 
Z3A.35 and STD exposure Z20.2

 

                          [place] [address] [facility] [id number]

[address] [place] [address]

                          30 2016              Proteinuria affecting pregna

ncy in third trimester O12.13 ; Chronic

hypertension affecting pregnancy O10.919 ; Prenatal care, first pregnancy in 
third trimester Z34.03 and 34 weeks gestation of pregnancy Z3A.34

 

                          [place] [address] [facility] [id number]

[address] [place] [phone]

                                         

 

                          [place] [address] [facility] [id number]

[address] [place] [phone]

                                        Proteinuria affecting pregna

ncy in third trimester O12.13 ; 33 

weeks gestation of pregnancy Z3A.33 and Chronic hypertension affecting pregnancy
O10.919

 

                          [place] [address] [facility] [id number]

[address] [place] [address]

                          16 2016               

 

                          [place] [address] [facility] [id number]

[address] [place] [phone]

                          15 2016               

 

                          [place] [address] [facility] [id number]

[address] [place] [phone]

                                        Third trimester pregnancy Z3

3.1 ; 31 weeks gestation of pregnancy 

Z3A.31 ; Abnormal quad screen O28.0 ; Proteinuria affecting pregnancy in third 
trimester O12.13 and Encounter for immunization Z23

 

                          [place] [address] [facility] [id number]

[address] [place] [phone]

                          21 Oct, 2016               

 

                          [place] [address] [facility] [id number]

[address] [place] [phone]

                          18 Oct, 2016              Proteinuria affecting pregna

ncy in third trimester O12.13 ; Third 

trimester bleeding O46.93 ; Unspecified abdominal pain R10.9 ; Other specified 
pregnancy related conditions, unspecified trimester O26.899 and 28 weeks 
gestation of pregnancy Z3A.28

 

                          [place] [address] [facility] [id number]

[address] [place] [phone]

                          13 Oct, 2016               

 

                          [place] [address] [facility] [id number]

[address] [place] [phone]

                          12 Oct, 2016              Syncope, unspecified syncope

 type R55 ; Hypertension affecting 

pregnancy in second trimester O16.2 ; Proteinuria affecting pregnancy in second 
trimester O12.12 and 27 weeks gestation of pregnancy Z3A.27

 

                          [place] [address] [facility] [id number]

[address] [place] [phone]

                          07 Oct, 2016              Diabetes mellitus screening 

Z13.1 ; Pregnancy, first, second 

trimester Z34.02 and 27 weeks gestation of pregnancy Z3A.27

 

                          [place] [address] [facility] [id number]

[address] [place] [phone]

                          04 Oct, 2016              Hypertension affecting pregn

roro in second trimester O16.2 and 

Proteinuria affecting pregnancy in second trimester O12.12

 

                          [place] [address] [facility] [id number]

[address] [place] [phone]

                          04 Oct, 2016              Hypertension affecting pregn

roro in second trimester O16.2 and 

Proteinuria affecting pregnancy in second trimester O12.12

 

                          [place] [address] [facility] [id number]

[address] [place] [phone]

                          03 Oct, 2016              Proteinuria affecting pregna

ncy in second trimester O12.12 and 

Hypertension affecting pregnancy in second trimester O16.2

 

                          [place] [address] [facility] [id number]

[address] [place] [phone]

                          03 Oct, 2016              Hypertension affecting pregn

roro in second trimester O16.2

 

                          [place] [id number]

[address] [place] [phone]

                          03 Oct, 2016              Hypertension affecting pregn

roro in second trimester O16.2

 

                          [place] [address] [id number]

[address] [place] [phone]

                          23 Sep, 2016              Pregnancy, first, second tri

mester Z34.02 ; 25 weeks gestation of 

pregnancy Z3A.25 and Encounter for immunization Z23

 

                          [place] [id number]

[address] [place] [phone]

                          08 Sep, 2016              Hypertension affecting pregn

roro in second trimester O16.2 ; 

Proteinuria affecting pregnancy in second trimester O12.12 and 22 weeks 
gestation of pregnancy Z3A.22

 

                          [place] [address] [address]

                          01 Sep, 2016              21 weeks gestation of pregna

ncy Z3A.21 ; Proteinuria affecting 

pregnancy in second trimester O12.12 ; Hypertension affecting pregnancy in 
second trimester O16.2 and Blurry vision, left eye H53.8

 

                          [place] [address] [id number]

[address] [place] [phone]

                          29 Aug, 2016              Pregnancy, first, second tri

mester Z34.02 ; Proteinuria affecting 

pregnancy in third trimester O12.13 ; Hypertension affecting pregnancy, second 
trimester O16.2 ; 21 weeks gestation of pregnancy Z3A.21 and Evaluate anatomy 
not seen on prior sonogram Z36

 

                          [place] [id number]

[address] [place] [phone]

                          26 Aug, 2016              Pregnancy, first, second tri

mester Z34.02 ; Hypertension affecting 

pregnancy, second trimester O16.2 ; Proteinuria affecting pregnancy in third 
trimester O12.13 ; 21 weeks gestation of pregnancy Z3A.21 and Evaluate anatomy 
not seen on prior sonogram Z36

 

                          [place] [id number]

[address] [place] [phone]

                          04 Aug, 2016              Abnormal quad screen O28.0 a

nd 17 weeks gestation of pregnancy 

Z3A.17

 

                          [place] [address] [id number]

[address] [place] [phone]

                                        Normal pregnancy, first Z34.

00 and 17 weeks gestation of pregnancy 

Z3A.17

 

                          [facility]     [address] [id number]

[address] [place] [phone]

                                        Normal pregnancy, first Z34.

00 and 12 weeks gestation of pregnancy 

Z3A.12

 

                          [facility]     [address] [facility] [id number]

[address] [place] [phone]

                          27 May, 2016              Fetal heart tones not heard 

O76 ; 20 weeks gestation of pregnancy 

Z3A.20 and Pregnancy, first, second trimester Z34.02

 

                          [facility]     [address] [facility] [id number]

[address] [place] [phone]

                          18 May, 2016               

 

                          [facility]     [address] [facility] [id number]

[address] [place] [phone]

                          17 May, 2016              Encounter for pregnancy test

, result positive Z32.01

 

                          [facility]  [address] [facility] [id number]

[address] [place] [address] 

[phone]                              Low back pain M54.5 ; Sore t

hroat J02.9 ; Acute right 

lower quadrant pain R10.31 and Cough R05

 

                          [facility]     [address] [id number]

[address] [place] [phone]

                                         

 

                          [facility]     [address] [facility] [id number]

[address] [place] [phone]

                                         

 

                          [facility]     [address] [id number]

[address] [place] [phone]

                                         

 

                          [facility]     [address] [facility] [id number]

[address] [place] [phone]

                                         

 

                          [facility]     [address] [facility] [id number]

[address] [place] [phone]

                                         

 

                          [facility]     [address] [facility] [id number]

[address] [place] [phone]

                                         

 

                          [facility]     [address] [facility] [id number]

[address] [place] [phone]

                                         

 

                          [facility]     [address] [id number]

[address] [place] [phone]

                                         

 

                          [facility]     [address] [id number]

[address] [place] [phone]

                          31 Dec, 2014               

 

                          [facility]     [address] [id number]

[address] [place] [phone]

                          31 Dec, 2014               

 

                          [facility]     [address] [id number]

[address] [place] [phone]

                          22 Oct, 2013               

 

                          [facility]     [address] [id number]

[address] [place] [phone]

                          22 Oct, 2013               

 

                          [facility]     [address] [id number]

[address] [place] [phone]

                          15 Oct, 2013               

 

                          [facility]     [address] [id number]

[address] [place] [phone]

                          15 Oct, 2013               

 

                          [facility]     [address] [id number]

[address] [place] [phone]

                          08 Oct, 2013               

 

                          [facility]     [address] [id number]

[address] [place] [phone]

                          05 Oct, 2013               

 

                          [facility]     [address] [id number]

[address] [place] [phone]

                          04 Oct, 2013               

 

                          [facility]     [address] [id number]

[address] [place] [phone]

                          02 Oct, 2013               

 

                          [facility]     [address] [id number]

[address] [place] [phone]

                          02 Oct, 2013               

 

                          [facility]     [address] [id number]

[address] [place] [phone]

                          27 Aug, 2013               

 

                          [facility]     [address] [id number]

[address] [place] [phone]

                          22 Aug, 2013               







IMMUNIZATIONS

No Known Immunizations



SOCIAL HISTORY

Never Assessed



REASON FOR VISIT





PLAN OF CARE





VITAL SIGNS





MEDICATIONS

Unknown Medications



RESULTS

No Results



PROCEDURES

No Known procedures



INSTRUCTIONS





MEDICATIONS ADMINISTERED

No Known Medications



MEDICAL (GENERAL) HISTORY





                    Type                Description         Date

 

                    Medical History     HTN                  

 

                    Medical History     [place] Previa     

 

                    Medical History     Pre-eclampsia in pregnancy  

 

                    Medical History     Post-partum Depression  

 

                    Medical History     Postpartum depression  

 

                    Medical History     Postpartum depression  

 

                    Medical History     Anxiety, generalized  

 

                    Surgical History    No Surgical history information  

 

                    Hospitalization History Childbirth

## 2020-10-11 ENCOUNTER — HOSPITAL ENCOUNTER (EMERGENCY)
Dept: HOSPITAL 75 - ER | Age: 30
Discharge: HOME | End: 2020-10-11
Payer: SELF-PAY

## 2020-10-11 VITALS — WEIGHT: 244.71 LBS | BODY MASS INDEX: 33.51 KG/M2 | HEIGHT: 71.65 IN

## 2020-10-11 VITALS — DIASTOLIC BLOOD PRESSURE: 81 MMHG | SYSTOLIC BLOOD PRESSURE: 133 MMHG

## 2020-10-11 DIAGNOSIS — Z82.49: ICD-10-CM

## 2020-10-11 DIAGNOSIS — F17.210: ICD-10-CM

## 2020-10-11 DIAGNOSIS — Z83.3: ICD-10-CM

## 2020-10-11 DIAGNOSIS — Z80.0: ICD-10-CM

## 2020-10-11 DIAGNOSIS — J11.1: Primary | ICD-10-CM

## 2020-10-11 DIAGNOSIS — J45.909: ICD-10-CM

## 2020-10-11 DIAGNOSIS — Z20.828: ICD-10-CM

## 2020-10-11 DIAGNOSIS — Z88.8: ICD-10-CM

## 2020-10-11 PROCEDURE — 84703 CHORIONIC GONADOTROPIN ASSAY: CPT

## 2020-10-11 PROCEDURE — 87430 STREP A AG IA: CPT

## 2020-10-11 PROCEDURE — 87635 SARS-COV-2 COVID-19 AMP PRB: CPT

## 2020-10-11 PROCEDURE — 87804 INFLUENZA ASSAY W/OPTIC: CPT

## 2020-10-11 PROCEDURE — 71045 X-RAY EXAM CHEST 1 VIEW: CPT

## 2020-10-11 PROCEDURE — 99283 EMERGENCY DEPT VISIT LOW MDM: CPT

## 2020-10-11 NOTE — DIAGNOSTIC IMAGING REPORT
INDICATION: Cough. 



COMPARISON: 05/25/2020. 



EXAMINATION: Single view of the chest was obtained.



FINDINGS: Clear lungs, bilaterally. The heart is normal. There is

no pneumothorax but osseous structures are normal. 



IMPRESSION: Negative chest. 



Dictated by: 



  Dictated on workstation # [id number]

## 2020-10-11 NOTE — ED RESPIRATORY
General


Chief Complaint:  Cough/Cold/Flu Symptoms


Stated Complaint:  FEVER,COUGH, SOB, N/V


Nursing Triage Note:  


PT HERE WITH COUGH, SORE THROAT, SINUS DRAINAGE, AND VOMITING X 1 WK.


Source:  patient


Exam Limitations:  no limitations





History of Present Illness


Date Seen by Provider:  Oct 11, 2020


Time Seen by Provider:  20:55


Initial Comments


This 30-year-old young lady presents with flulike symptoms including cough, sore

throat, congestion, and vomiting for about 5 days.  She has continued to work 

during this time.  She developed fever up to 101 yesterday.





Allergies and Home Medications


Allergies


Coded Allergies:  


     diphenhydramine (Verified  Allergy, Unknown, 19)





Home Medications


Albuterol Sulfate 1 Puff Puff, 2 PUFF IH Q4H PRN for COUGH


   1 PUFF = 90 MCG 


   Prescribed by: JEANETTE GONZALEZ on 19 0602


Benzonatate 100 Mg Capsule, 100 MG PO Q6H PRN for COUGH


   Prescribed by: JEANETTE GONZALEZ on 19 0602


Benzonatate 100 Mg Capsule, 200 MG PO TID


   Prescribed by: SALENA PAZ on 10/11/20 2240


Cefdinir 300 Mg Capsule, 300 MG PO BID


   Prescribed by: NEVAEH PROCTOR on 20 1404





Patient Home Medication List


Home Medication List Reviewed:  Yes





Review of Systems


Review of Systems


Constitutional:  see HPI


EENTM:  see HPI


Respiratory:  see HPI


Cardiovascular:  no symptoms reported


Gastrointestinal:  see HPI


Genitourinary:  no symptoms reported


Pregnant:  No


Musculoskeletal:  no symptoms reported


Skin:  no symptoms reported


Psychiatric/Neurological:  No Symptoms Reported


Hematologic/Lymphatic:  No Symptoms Reported





Past Medical-Social-Family Hx


Past Med/Social Hx:  Reviewed Nursing Past Med/Soc Hx


Patient Social History


Alcohol Use:  Denies Use


Recreational Drug Use:  No


Smoking Status:  Current Everyday Smoker


Type Used:  Cigarettes


Former Smoker, Quit:  2016


2nd Hand Smoke Exposure:  Yes


Recent Foreign Travel:  No


Contact w/Someone Who Travel:  No


Recent Infectious Disease Expo:  No


Recent Hopitalizations:  No





Immunizations Up To Date


Tetanus Booster (TDap):  Unknown


PED Vaccines UTD:  No


Date of Influenza Vaccine:  Sep 23, 2016





Seasonal Allergies


Seasonal Allergies:  Yes





Past Medical History


Surgeries:  No


Respiratory:  No


Asthma


Cardiac:  Yes (NO MEDICATIONS FOR A YEAR. PER PT ON 20)


Hypertension


Neurological:  No


Reproductive Disorders:  No


Female Reproductive Disorders:  Denies


Sexually Transmitted Disease:  No


HIV/AIDS:  No


Genitourinary:  No


Gastrointestinal:  No


Musculoskeletal:  No


Endocrine:  No


HEENT:  No


Cancer:  No


Psychosocial:  Yes


Anxiety, Bipolar, Depression


Integumentary:  No


Blood Disorders:  No


Adverse Reaction/Blood Tranf:  No





Family Medical History





Asthma


  19 MOTHER


  G8 SISTER


  Maternal grandmother


Colon cancer


  Maternal grandfather


  Paternal grandmother


Diabetes mellitus


  Maternal grandfather


Hypertension


  Maternal grandfather


  Maternal grandmother


No Pertinent Family Hx





Physical Exam





Vital Signs - First Documented








 10/11/20





 20:53


 


Temp 36.6


 


Pulse 88


 


Resp 18


 


B/P (MAP) 133/81 (98)


 


Pulse Ox 99


 


O2 Delivery Room Air





Capillary Refill : Less Than 3 Seconds


Height: 6'0"


Weight: 254lbs. 0.0oz. 115.822207dr; 33.00 BMI


Method:Stated


General Appearance:  WD/WN, no apparent distress


HEENT:  PERRL/EOMI, normal ENT inspection, pharyngeal erythema


Neck:  normal inspection


Respiratory:  lungs clear, normal breath sounds, no respiratory distress, no 

accessory muscle use, other (coughing)


Cardiovascular:  regular rate, rhythm, no edema, no murmur


Gastrointestinal:  normal bowel sounds, non tender, soft


Extremities:  normal inspection, no pedal edema


Neurologic/Psychiatric:  CNs II-XII nml as tested, no motor/sensory deficits, 

alert, normal mood/affect, oriented x 3


Skin:  normal color, warm/dry





Progress/Results/Core Measures


Suspected Sepsis


Recent Fever Within 48 Hours:  Yes


Infection Criteria Present:  None


New/Unexplained  Altered Menta:  No


Sepsis Screen:  No Definite Risk


SIRS


Temperature: 


Pulse: 88 


Respiratory Rate: 18


 


Blood Pressure 133 /81 


Mean: 98





Results/Orders


Lab Results





Laboratory Tests








Test


 10/11/20


21:04 Range/Units


 


 


Group A Streptococcus Screen NEGATIVE  NEGATIVE  








Micro Results





Microbiology


10/11/20 Influenza Types A,B Antigen (TIM) - Final, Complete


           





My Orders





Orders - SALENA RANDOLPH MD


Urine Pregnancy Bedside (10/11/20 21:02)


Ondansetron  Oral Dissolve Tab (Zofran (10/11/20 21:15)


Influenza A And B Antigens (10/11/20 21:02)


Rapid Strep A Screen (10/11/20 21:02)


Coronavirus Sars-Cov-2 So  (10/11/20 21:02)


Chest 1 View, Ap/Pa Only (10/11/20 21:02)


Benzonatate Capsule (Tessalon Perles) (10/11/20 22:45)





Medications Given in ED





Vital Signs/I&O











 10/11/20 10/11/20





 20:53 23:02


 


Temp 36.6 36.6


 


Pulse 88 88


 


Resp 18 18


 


B/P (MAP) 133/81 (98) 133/81 (98)


 


Pulse Ox 99 99


 


O2 Delivery Room Air 





Capillary Refill : Less Than 3 Seconds








Blood Pressure Mean:                    98








Progress Note :  


Progress Note


flu and rapid strep tests were negative.  COVID PCR swab was obtained.  Zofran 

was given for nausea and Tessalon Perles were given for cough suppression.  

Patient was discharged home.  We reviewed COVID 19 precautions and quarantine 

instructions.





Diagnostic Imaging





   Diagonstic Imaging:  Xray


   Plain Films/CT/US/NM/MRI:  chest


Comments


Chest x-ray viewed by me and report reviewed.  See report below:





NAME:   DAMIAN KEN


[facility] REC#:   [id number]


ACCOUNT#:   [id number]


PT STATUS:   REG ER


:   1990


PHYSICIAN:   SALENA RANDOLPH MD


ADMIT DATE:   10/11/20/ER


***Signed***


Date of Exam:10/11/20





CHEST 1 VIEW, AP/PA ONLY





INDICATION: Cough. 





COMPARISON: 2020. 





EXAMINATION: Single view of the chest was obtained.





FINDINGS: Clear lungs, bilaterally. The heart is normal. There is


no pneumothorax but osseous structures are normal. 





IMPRESSION: Negative chest. 





Dictated by: 





  Dictated on workstation # [id number]





Dict:   10/11/20 2140


Trans:   10/11/20 2142


[facility] [phone]





Interpreted by:     JAVIER WEATHERS


Electronically signed by: JAVIER WEATHERS 10/11/20 2142





Departure


Impression





   Primary Impression:  


   Flu-like symptoms


   Additional Impression:  


   Person under investigation for COVID-19


Disposition:  01 HOME, SELF-CARE


Condition:  Stable





Departure-Patient Inst.


Decision time for Depature:  22:38


Referrals:  


CLAUDY MCCOLLUM MD (PCP/Family)


Primary Care Physician


Patient Instructions:  Coronavirus Disease 2019 (COVID-19) Overview





Add. Discharge Instructions:  


You're presently a person under investigation for COVID-19.  Please remain in 

quarantine until the result of your COVID-19 testing is known.


Continue using your albuterol inhaler as prescribed.


Use Tessalon Perles as prescribed as a cough suppressant.


For pain or fever or you may use ibuprofen and/or Tylenol.


Call your doctor or return to care if you have worsening symptoms.





All discharge instructions reviewed with patient and/or family. Voiced 

understanding.


Scripts


Benzonatate (TESSALON PERLES) 100 Mg Capsule


200 MG PO TID, #20 CAP


   Prov: SALENA RANDOLPH MD         10/11/20


Work/School Note:  Work Release Form   Date Seen in the Emergency Department:  

Oct 11, 2020


   Return to Work:  Oct 15, 2020


      Other Restrictions Listed Below:  Return if no fever or significant 

symptoms 72 hours and COVID negative.


   Restrictions:  If COVID positive, quarantine until released by health 

department.











SALENA RANDOLPH MD        Oct 11, 2020 22:12

## 2021-04-25 ENCOUNTER — HOSPITAL ENCOUNTER (EMERGENCY)
Dept: HOSPITAL 75 - ER | Age: 31
LOS: 1 days | Discharge: HOME | End: 2021-04-26
Payer: SELF-PAY

## 2021-04-25 DIAGNOSIS — R05: ICD-10-CM

## 2021-04-25 DIAGNOSIS — Z3A.00: ICD-10-CM

## 2021-04-25 DIAGNOSIS — J45.909: ICD-10-CM

## 2021-04-25 DIAGNOSIS — I10: ICD-10-CM

## 2021-04-25 DIAGNOSIS — Z88.8: ICD-10-CM

## 2021-04-25 DIAGNOSIS — Z20.822: ICD-10-CM

## 2021-04-25 DIAGNOSIS — Z87.891: ICD-10-CM

## 2021-04-25 DIAGNOSIS — O21.0: Primary | ICD-10-CM

## 2021-04-25 LAB
ALBUMIN SERPL-MCNC: 4.1 GM/DL (ref 3.2–4.5)
ALP SERPL-CCNC: 63 U/L (ref 40–136)
ALT SERPL-CCNC: 42 U/L (ref 0–55)
APTT PPP: YELLOW S
BACTERIA #/AREA URNS HPF: NEGATIVE /HPF
BASOPHILS # BLD AUTO: 0 10^3/UL (ref 0–0.1)
BASOPHILS NFR BLD AUTO: 0 % (ref 0–10)
BILIRUB SERPL-MCNC: 0.5 MG/DL (ref 0.1–1)
BILIRUB UR QL STRIP: NEGATIVE
BUN/CREAT SERPL: 16
CALCIUM SERPL-MCNC: 9.1 MG/DL (ref 8.5–10.1)
CHLORIDE SERPL-SCNC: 111 MMOL/L (ref 98–107)
CO2 SERPL-SCNC: 20 MMOL/L (ref 21–32)
CREAT SERPL-MCNC: 0.7 MG/DL (ref 0.6–1.3)
EOSINOPHIL # BLD AUTO: 0.3 10^3/UL (ref 0–0.3)
EOSINOPHIL NFR BLD AUTO: 3 % (ref 0–10)
FIBRINOGEN PPP-MCNC: (no result) MG/DL
GFR SERPLBLD BASED ON 1.73 SQ M-ARVRAT: > 60 ML/MIN
GLUCOSE SERPL-MCNC: 99 MG/DL (ref 70–105)
GLUCOSE UR STRIP-MCNC: NEGATIVE MG/DL
HCT VFR BLD CALC: 44 % (ref 35–52)
HGB BLD-MCNC: 15 G/DL (ref 11.5–16)
KETONES UR QL STRIP: (no result)
LEUKOCYTE ESTERASE UR QL STRIP: NEGATIVE
LYMPHOCYTES # BLD AUTO: 2.9 10^3/UL (ref 1–4)
LYMPHOCYTES NFR BLD AUTO: 31 % (ref 12–44)
MAGNESIUM SERPL-MCNC: 1.7 MG/DL (ref 1.6–2.4)
MANUAL DIFFERENTIAL PERFORMED BLD QL: NO
MCH RBC QN AUTO: 31 PG (ref 25–34)
MCHC RBC AUTO-ENTMCNC: 34 G/DL (ref 32–36)
MCV RBC AUTO: 91 FL (ref 80–99)
MONOCYTES # BLD AUTO: 0.8 10^3/UL (ref 0–1)
MONOCYTES NFR BLD AUTO: 8 % (ref 0–12)
NEUTROPHILS # BLD AUTO: 5.6 10^3/UL (ref 1.8–7.8)
NEUTROPHILS NFR BLD AUTO: 58 % (ref 42–75)
NITRITE UR QL STRIP: NEGATIVE
PH UR STRIP: 5.5 [PH] (ref 5–9)
PLATELET # BLD: 198 10^3/UL (ref 130–400)
PMV BLD AUTO: 10.9 FL (ref 9–12.2)
POTASSIUM SERPL-SCNC: 3.9 MMOL/L (ref 3.6–5)
PROT SERPL-MCNC: 7 GM/DL (ref 6.4–8.2)
PROT UR QL STRIP: NEGATIVE
RBC #/AREA URNS HPF: (no result) /HPF
SODIUM SERPL-SCNC: 142 MMOL/L (ref 135–145)
SP GR UR STRIP: >=1.03 (ref 1.02–1.02)
WBC # BLD AUTO: 9.6 10^3/UL (ref 4.3–11)
WBC #/AREA URNS HPF: (no result) /HPF

## 2021-04-25 PROCEDURE — 84703 CHORIONIC GONADOTROPIN ASSAY: CPT

## 2021-04-25 PROCEDURE — 36415 COLL VENOUS BLD VENIPUNCTURE: CPT

## 2021-04-25 PROCEDURE — 80053 COMPREHEN METABOLIC PANEL: CPT

## 2021-04-25 PROCEDURE — 83735 ASSAY OF MAGNESIUM: CPT

## 2021-04-25 PROCEDURE — 81000 URINALYSIS NONAUTO W/SCOPE: CPT

## 2021-04-25 PROCEDURE — 85025 COMPLETE CBC W/AUTO DIFF WBC: CPT

## 2021-04-25 PROCEDURE — 99282 EMERGENCY DEPT VISIT SF MDM: CPT

## 2021-04-25 PROCEDURE — 87635 SARS-COV-2 COVID-19 AMP PRB: CPT

## 2021-04-26 VITALS — DIASTOLIC BLOOD PRESSURE: 91 MMHG | SYSTOLIC BLOOD PRESSURE: 127 MMHG

## 2021-04-26 NOTE — ED GENERAL
General


Chief Complaint:  Abdominal/GI Problems


Stated Complaint:  VOMITING/SOB/APPROX 9-10 WKS PREG


Source of Information:  Patient


Exam Limitations:  No Limitations





History of Present Illness


Date Seen by Provider:  Apr 25, 2021


Time Seen by Provider:  21:50


Initial Comments


This 30-year-old young lady presents to emergency room after finding out she was

pregnant by home urine pregnancy test 2 days ago and is now experiencing 

vomiting nonstop throughout the day.  She reports prior high risk pregnancy with

preeclampsia, placental previa, and pregnancy related hypertension.  She tried 

taking Tums at home but immediately threw them up.  She has had a minor cough in

the past couple of days but denies fever.  She reports numerous negative Covid 

tests in the past but has not had one recently.  She is afebrile.





Allergies and Home Medications


Allergies


Coded Allergies:  


     diphenhydramine (Verified  Allergy, Unknown, 11/29/19)





Home Medications


Albuterol Sulfate 1 Puff Puff, 2 PUFF IH Q4H PRN for COUGH


   1 PUFF = 90 MCG 


   Prescribed by: JEANETTE GONZALEZ on 11/29/19 0602


Benzonatate 100 Mg Capsule, 100 MG PO Q6H PRN for COUGH


   Prescribed by: JEANETTE GONZALEZ on 11/29/19 0602


Benzonatate 100 Mg Capsule, 200 MG PO TID


   Prescribed by: SALENA PAZ on 10/11/20 2240


Cefdinir 300 Mg Capsule, 300 MG PO BID


   Prescribed by: NEVAEH PROCTOR on 5/25/20 1404


Promethazine HCl 25 Mg Tablet, 25 MG PO Q6H PRN for NAUSEA/VOMITING


   Prescribed by: SALENA PAZ on 4/26/21 0027





Patient Home Medication List


Home Medication List Reviewed:  Yes





Review of Systems


Review of Systems


Constitutional:  no symptoms reported


EENTM:  no symptoms reported


Respiratory:  no symptoms reported


Cardiovascular:  no symptoms reported


Gastrointestinal:  see HPI


Genitourinary:  see HPI


Pregnant:  Yes


Musculoskeletal:  no symptoms reported


Skin:  no symptoms reported


Psychiatric/Neurological:  No Symptoms Reported


Hematologic/Lymphatic:  No Symptoms Reported





Past Medical-Social-Family Hx


Past Med/Social Hx:  Reviewed and Corrections made


Patient Social History


Type Used:  Cigarettes


Former Smoker, Quit:  Jun 11, 2016


2nd Hand Smoke Exposure:  Yes


Recent Hopitalizations:  No





Immunizations Up To Date


Tetanus Booster (TDap):  Unknown


PED Vaccines UTD:  No


Date of Influenza Vaccine:  Sep 23, 2016





Seasonal Allergies


Seasonal Allergies:  Yes





Past Medical History


Surgeries:  No


Respiratory:  No


Asthma


Cardiac:  Yes (NO MEDICATIONS FOR A YEAR. PER PT ON 05/25/20)


Hypertension


Neurological:  No


Reproductive Disorders:  Yes (History of preeclampsia, placental previa, and 

pregnancy-induced hypertensi)


Sexually Transmitted Disease:  No


HIV/AIDS:  No


Genitourinary:  No


Gastrointestinal:  No


Musculoskeletal:  No


Endocrine:  No


HEENT:  No


Cancer:  No


Psychosocial:  Yes


Anxiety, Bipolar, Depression


Integumentary:  No


Blood Disorders:  No


Adverse Reaction/Blood Tranf:  No





Family Medical History





Asthma


  19 MOTHER


  G8 SISTER


  Maternal grandmother


Colon cancer


  Maternal grandfather


  Paternal grandmother


Diabetes mellitus


  Maternal grandfather


Hypertension


  Maternal grandfather


  Maternal grandmother


No Pertinent Family Hx





Physical Exam


Vital Signs





Vital Signs - First Documented








 4/26/21





 00:30


 


Pulse 86


 


Resp 14


 


B/P (MAP) 127/91


 


Pulse Ox 98


 


O2 Delivery Room Air





Capillary Refill :


Height, Weight, BMI


Height: 6'0"


Weight: 254lbs. 0.0oz. 115.658154pa; 33.00 BMI


Method:Stated


General Appearance:  No Apparent Distress, WD/WN


HEENT:  PERRL/EOMI, Normal ENT Inspection


Neck:  Normal Inspection


Respiratory:  Lungs Clear, Normal Breath Sounds, No Accessory Muscle Use


Cardiovascular:  Regular Rate, Rhythm, No Edema, No Murmur


Gastrointestinal:  Non Tender, Soft


Extremity:  Normal Inspection, No Pedal Edema


Neurologic/Psychiatric:  Alert, Oriented x3, No Motor/Sensory Deficits, Normal 

Mood/Affect, CNs II-XII Norm as Tested


Skin:  Normal Color, Warm/Dry





Progress/Results/Core Measures


Suspected Sepsis


SIRS


Temperature: 


Pulse:  


Respiratory Rate: 


 


Laboratory Tests


4/25/21 21:20: White Blood Count 9.6


Blood Pressure  / 


Mean: 


 


Laboratory Tests


4/25/21 21:20: 


Creatinine 0.70, Platelet Count 198, Total Bilirubin 0.5








Results/Orders


Lab Results





Laboratory Tests








Test


 4/25/21


21:15 4/25/21


21:20 4/25/21


23:50 Range/Units


 


 


Urine Color YELLOW     


 


Urine Clarity SL CLOUDY     


 


Urine pH 5.5    5-9  


 


Urine Specific Gravity >=1.030    1.016-1.022  


 


Urine Protein NEGATIVE    NEGATIVE  


 


Urine Glucose (UA) NEGATIVE    NEGATIVE  


 


Urine Ketones TRACE H   NEGATIVE  


 


Urine Nitrite NEGATIVE    NEGATIVE  


 


Urine Bilirubin NEGATIVE    NEGATIVE  


 


Urine Urobilinogen 1.0    < = 1.0  MG/DL


 


Urine Leukocyte Esterase NEGATIVE    NEGATIVE  


 


Urine RBC (Auto) NEGATIVE    NEGATIVE  


 


Urine RBC NONE     /HPF


 


Urine WBC NONE     /HPF


 


Urine Squamous Epithelial


Cells 10-25 H


 


 


  /HPF





 


Urine Crystals NONE     /LPF


 


Urine Bacteria NEGATIVE     /HPF


 


Urine Casts NONE     /LPF


 


Urine Mucus NEGATIVE     /LPF


 


Urine Culture Indicated NO     


 


White Blood Count


 


 9.6 


 


 4.3-11.0


10^3/uL


 


Red Blood Count


 


 4.87 


 


 3.80-5.11


10^6/uL


 


Hemoglobin  15.0   11.5-16.0  g/dL


 


Hematocrit  44   35-52  %


 


Mean Corpuscular Volume  91   80-99  fL


 


Mean Corpuscular Hemoglobin  31   25-34  pg


 


Mean Corpuscular Hemoglobin


Concent 


 34 


 


 32-36  g/dL





 


Red Cell Distribution Width  12.5   10.0-14.5  %


 


Platelet Count


 


 198 


 


 130-400


10^3/uL


 


Mean Platelet Volume  10.9   9.0-12.2  fL


 


Immature Granulocyte % (Auto)  0    %


 


Neutrophils (%) (Auto)  58   42-75  %


 


Lymphocytes (%) (Auto)  31   12-44  %


 


Monocytes (%) (Auto)  8   0-12  %


 


Eosinophils (%) (Auto)  3   0-10  %


 


Basophils (%) (Auto)  0   0-10  %


 


Neutrophils # (Auto)


 


 5.6 


 


 1.8-7.8


10^3/uL


 


Lymphocytes # (Auto)


 


 2.9 


 


 1.0-4.0


10^3/uL


 


Monocytes # (Auto)


 


 0.8 


 


 0.0-1.0


10^3/uL


 


Eosinophils # (Auto)


 


 0.3 


 


 0.0-0.3


10^3/uL


 


Basophils # (Auto)


 


 0.0 


 


 0.0-0.1


10^3/uL


 


Immature Granulocyte # (Auto)


 


 0.0 


 


 0.0-0.1


10^3/uL


 


Sodium Level  142   135-145  MMOL/L


 


Potassium Level  3.9   3.6-5.0  MMOL/L


 


Chloride Level  111 H    MMOL/L


 


Carbon Dioxide Level  20 L  21-32  MMOL/L


 


Anion Gap  11   5-14  MMOL/L


 


Blood Urea Nitrogen  11   7-18  MG/DL


 


Creatinine


 


 0.70 


 


 0.60-1.30


MG/DL


 


Estimat Glomerular Filtration


Rate 


 > 60 


 


  





 


BUN/Creatinine Ratio  16    


 


Glucose Level  99     MG/DL


 


Calcium Level  9.1   8.5-10.1  MG/DL


 


Corrected Calcium  9.0   8.5-10.1  MG/DL


 


Magnesium Level  1.7   1.6-2.4  MG/DL


 


Total Bilirubin  0.5   0.1-1.0  MG/DL


 


Aspartate Amino Transf


(AST/SGOT) 


 27 


 


 5-34  U/L





 


Alanine Aminotransferase


(ALT/SGPT) 


 42 


 


 0-55  U/L





 


Alkaline Phosphatase  63     U/L


 


Total Protein  7.0   6.4-8.2  GM/DL


 


Albumin  4.1   3.2-4.5  GM/DL


 


Serum Pregnancy Test,


Qualitative 


 POSITIVE 


 


 NEGATIVE  





 


Coronavirus 2019 ([facility])   Not Detected  Not Detecte  








My Orders





Orders - SALENA RANDOLPH MD


Covid 19 Inhouse Test (4/25/21 21:51)


Cbc With Automated Diff (4/25/21 21:51)


Comprehensive Metabolic Panel (4/25/21 21:51)


Hcg,Qualitative Serum (4/25/21 21:51)


Magnesium (4/25/21 21:51)


Ua Culture If Indicated (4/25/21 21:51)


Promethazine Injection (Phenergan Injec (4/25/21 22:00)


Ed Iv/Invasive Line Start (4/25/21 21:51)


Lactated Ringers (Lr 1000 Ml Iv Solution (4/25/21 22:00)





Medications Given in ED





Vital Signs/I&O











 4/26/21





 00:30


 


Pulse 86


 


Resp 14


 


B/P (MAP) 127/91


 


Pulse Ox 98


 


O2 Delivery Room Air





Capillary Refill :


Progress Note :  


Progress Note


Patient was treated with Phenergan and a liter of IV fluids with improvement in 

symptoms.  Work-up was unremarkable including COVID-19 testing.





Departure


Impression





   Primary Impression:  


   Nausea and vomiting


   Qualified Codes:  R11.2 - Nausea with vomiting, unspecified


   Additional Impressions:  


   Cough


   Pregnancy


   Qualified Codes:  Z34.90 - Encounter for supervision of normal pregnancy, 

   unspecified, unspecified trimester


Disposition:  01 HOME, SELF-CARE


Condition:  Improved





Departure-Patient Inst.


Decision time for Depature:  00:26


Referrals:  


CLAUDY MCCOLLUM MD (PCP/Family)


Primary Care Physician


Patient Instructions:  Nausea and Vomiting of Pregnancy





Add. Discharge Instructions:  


Your labs were unremarkable and that your COVID-19 test was negative.


Drink plenty of clear liquids and eat small quantities of healthy foods often.  

Eating small amounts often should help prevent nausea and vomiting.


Use Phenergan as prescribed for nausea.  Please be advised this medication may 

cause drowsiness.


Contact your obstetrical provider tomorrow for further instructions.


Call with questions or concerns.


Return to the ER for worsening symptoms.





All discharge instructions reviewed with patient and/or family. Voiced 

understanding.


Scripts


Promethazine HCl (Promethazine Tablet) 25 Mg Tablet


25 MG PO Q6H PRN for NAUSEA/VOMITING, #10 TAB


   Prov: SALENA RANDOLPH MD         4/26/21


Work/School Note:  Work Release Form   Date Seen in the Emergency Department:  

Apr 26, 2021


   Return to Work:  Apr 27, 2021


   Restrictions:  No Restrictions











SALENA RANDOLPH MD        Apr 26, 2021 00:28

## 2021-05-11 ENCOUNTER — HOSPITAL ENCOUNTER (EMERGENCY)
Dept: HOSPITAL 75 - ER | Age: 31
Discharge: HOME | End: 2021-05-11
Payer: COMMERCIAL

## 2021-05-11 VITALS — WEIGHT: 245.37 LBS | HEIGHT: 71.65 IN | BODY MASS INDEX: 33.6 KG/M2

## 2021-05-11 VITALS — DIASTOLIC BLOOD PRESSURE: 79 MMHG | SYSTOLIC BLOOD PRESSURE: 139 MMHG

## 2021-05-11 DIAGNOSIS — O26.891: Primary | ICD-10-CM

## 2021-05-11 DIAGNOSIS — Z87.891: ICD-10-CM

## 2021-05-11 DIAGNOSIS — I10: ICD-10-CM

## 2021-05-11 DIAGNOSIS — Z3A.13: ICD-10-CM

## 2021-05-11 DIAGNOSIS — T58.91XA: ICD-10-CM

## 2021-05-11 DIAGNOSIS — Z88.8: ICD-10-CM

## 2021-05-11 PROCEDURE — 82375 ASSAY CARBOXYHB QUANT: CPT

## 2021-05-11 PROCEDURE — 99282 EMERGENCY DEPT VISIT SF MDM: CPT

## 2021-05-11 NOTE — ED GENERAL
General


Stated Complaint:  NAUSEA,COUGH-POSSIBLE EXPOSURSE 13 WKS PREG


Source of Information:  Patient


Exam Limitations:  No Limitations





History of Present Illness


Date Seen by Provider:  May 11, 2021


Time Seen by Provider:  08:15


Initial Comments


Patient is a 30-year-old female who is a G4, P1 who presents to the emergency 

department today with a chief complaint of concern for carbon monoxide 

poisoning.  Patient states that she has been working at [employer] which was shut

down last evening for a gas leak.  Patient states that she has had headaches, 

nausea and vomiting a little lower abdominal cramping over the course of the 

last several days.  Patient states that she works a lot.  She is concerned that 

she might have been exposed to carbon monoxide.  She denies any loss of 

consciousness, confusion.  She is uncertain what's early pregnancy symptoms 

versus carbon monoxide poisoning.  Patient is approximately 13 weeks pregnant.  

2 previous miscarriages.  One previous delivery.  Patient does smoke cigarettes.





No other recent illnesses or injuries.





All other review of systems reviewed and negative except as stated above.


Timing/Duration:  2-3 Days


Severity:  Mild





Allergies and Home Medications


Allergies


Coded Allergies:  


     diphenhydramine (Verified  Allergy, Unknown, 11/29/19)





Home Medications


Albuterol Sulfate 1 Puff Puff, 2 PUFF IH Q4H PRN for COUGH


   1 PUFF = 90 MCG 


   Prescribed by: JEANETTE GONZALEZ on 11/29/19 0602


Benzonatate 100 Mg Capsule, 100 MG PO Q6H PRN for COUGH


   Prescribed by: JEANETTE GONZALEZ on 11/29/19 0602


Benzonatate 100 Mg Capsule, 200 MG PO TID


   Prescribed by: SALENA PAZ on 10/11/20 2240


Cefdinir 300 Mg Capsule, 300 MG PO BID


   Prescribed by: NEVAEH PROCTOR on 5/25/20 1404


Promethazine HCl 25 Mg Tablet, 25 MG PO Q6H PRN for NAUSEA/VOMITING


   Prescribed by: SALENA PAZ on 4/26/21 0027





Patient Home Medication List


Home Medication List Reviewed:  Yes





Review of Systems


Review of Systems


Constitutional:  see HPI


EENTM:  no symptoms reported


Respiratory:  no symptoms reported


Cardiovascular:  no symptoms reported


Gastrointestinal:  diarrhea, nausea, vomiting


Genitourinary:  no symptoms reported


Pregnant:  Yes


Musculoskeletal:  no symptoms reported


Skin:  no symptoms reported


Psychiatric/Neurological:  No Symptoms Reported





All Other Systems Reviewed


Negative Unless Noted:  Yes





Past Medical-Social-Family Hx


Patient Social History


Type Used:  Cigarettes


Former Smoker, Quit:  Jun 11, 2016


2nd Hand Smoke Exposure:  Yes


Recent Hopitalizations:  No





Immunizations Up To Date


Tetanus Booster (TDap):  Unknown


PED Vaccines UTD:  No


Date of Influenza Vaccine:  Sep 23, 2016





Seasonal Allergies


Seasonal Allergies:  Yes





Past Medical History


Surgeries:  No


Respiratory:  No


Asthma


Cardiac:  Yes (NO MEDICATIONS FOR A YEAR. PER PT ON 05/25/20)


Hypertension


Neurological:  No


Reproductive Disorders:  Yes (History of preeclampsia, placental previa, and 

pregnancy-induced hypertensi)


Sexually Transmitted Disease:  No


HIV/AIDS:  No


Genitourinary:  No


Gastrointestinal:  No


Musculoskeletal:  No


Endocrine:  No


HEENT:  No


Cancer:  No


Psychosocial:  Yes


Anxiety, Bipolar, Depression


Integumentary:  No


Blood Disorders:  No


Adverse Reaction/Blood Tranf:  No





Family Medical History





Asthma


  19 MOTHER


  G8 SISTER


  Maternal grandmother


Colon cancer


  Maternal grandfather


  Paternal grandmother


Diabetes mellitus


  Maternal grandfather


Hypertension


  Maternal grandfather


  Maternal grandmother


No Pertinent Family Hx





Physical Exam


Vital Signs





Vital Signs - First Documented








 5/11/21





 08:15


 


Temp 36.8


 


Pulse 94


 


Resp 18


 


B/P (MAP) 153/99 (117)


 


Pulse Ox 99


 


O2 Delivery Room Air





Capillary Refill :


Height, Weight, BMI


Height: 6'0"


Weight: 254lbs. 0.0oz. [id number]; 33.00 BMI


Method:Stated


General Appearance:  No Apparent Distress, WD/WN


Eyes:  Bilateral Eye Normal Inspection, Bilateral Eye PERRL, Bilateral Eye EOMI


HEENT:  PERRL/EOMI


Neck:  Normal Inspection


Respiratory:  Lungs Clear, Normal Breath Sounds, No Accessory Muscle Use, No 

Respiratory Distress, Wheezing (Scant expiratory wheeze noted posteriorly left 

apical)


Cardiovascular:  Regular Rate, Rhythm


Gastrointestinal:  Non Tender, Soft


Extremity:  Normal Inspection


Neurologic/Psychiatric:  Alert, Oriented x3, No Motor/Sensory Deficits, Normal 

Mood/Affect


Skin:  Normal Color, Warm/Dry





Progress/Results/Core Measures


Suspected Sepsis


SIRS


Temperature: 


Pulse:  


Respiratory Rate: 


 


Blood Pressure  / 


Mean:





Results/Orders


Lab Results





Laboratory Tests








Test


 5/11/21


08:38 Range/Units


 


 


Carboxyhemoglobin 6.1 H 0.5-2.5  %








My Orders





Orders - CHAYITO RODRIGUEZ MD


Carboxyhemoglobin (5/11/21 08:25)





Vital Signs/I&O











 5/11/21





 08:15


 


Temp 36.8


 


Pulse 94


 


Resp 18


 


B/P (MAP) 153/99 (117)


 


Pulse Ox 99


 


O2 Delivery Room Air





Capillary Refill :


Progress Note :  


   Time:  08:57


Progress Note


Patient's carboxyhemoglobin level is 6.1.  She is also a smoker in addition to 

her exposure to carbon monoxide.  Patient is strongly encouraged to quit smoking

for her pregnancy benefit.  I was unable to obtain fetal heart tones either with

hand-held Doppler or with bedside ultra sound.  Patient may be not quite as far 

along as she believes she is.  Last menstrual cycle was February 10 which would 

place her at about 12 weeks pregnant.  I do believe that I was unable to 

auscultate fetal heart tones secondary to the patient's size.  Vaginal 

ultrasonography might be a better modality to demonstrate fetal heart tones.  At

this point she has no clinical or objective findings to warrant an invasive 

ultrasound.  She is medically stable.  Vital signs look good.  Patient has had 

no vomiting here in the emergency department will be discharged home with 

supportive care.  Return precautions given.  All questions sought and answered. 

Patient is stable for discharge.





Departure


Impression





   Primary Impression:  


   Carbon monoxide exposure


   Additional Impression:  


   First trimester pregnancy


Disposition:  01 HOME, SELF-CARE


Condition:  Stable





Departure-Patient Inst.


Decision time for Depature:  08:59


Referrals:  


CLAUDY MCCOLLUM MD (PCP/Family)


Primary Care Physician


Patient Instructions:  Pregnancy &#45; The Second Month





Add. Discharge Instructions:  


Drink plenty of fluids to stay well-hydrated.





Please try and quit smoking throughout your pregnancy.





Come back to the emergency room for any worsening symptoms of nausea vomiting, 

abdominal pain or any other emergent concerning symptoms.





Copy


Copies To 1:   CLAUDY MCCOLLUM MD, KATHRYN M MD         May 11, 2021 08:29

## 2021-11-27 ENCOUNTER — HOSPITAL ENCOUNTER (OUTPATIENT)
Dept: HOSPITAL 75 - LDRP | Age: 31
End: 2021-11-27
Attending: FAMILY MEDICINE
Payer: SELF-PAY

## 2021-11-27 VITALS — DIASTOLIC BLOOD PRESSURE: 89 MMHG | SYSTOLIC BLOOD PRESSURE: 140 MMHG

## 2021-11-27 VITALS — WEIGHT: 275.36 LBS | HEIGHT: 72 IN | BODY MASS INDEX: 37.3 KG/M2

## 2021-11-27 VITALS — DIASTOLIC BLOOD PRESSURE: 84 MMHG | SYSTOLIC BLOOD PRESSURE: 147 MMHG

## 2021-11-27 VITALS — SYSTOLIC BLOOD PRESSURE: 142 MMHG | DIASTOLIC BLOOD PRESSURE: 89 MMHG

## 2021-11-27 VITALS — DIASTOLIC BLOOD PRESSURE: 89 MMHG | SYSTOLIC BLOOD PRESSURE: 142 MMHG

## 2021-11-27 DIAGNOSIS — Z3A.00: ICD-10-CM

## 2021-11-27 LAB
AMORPH SED URNS QL MICRO: (no result) /LPF
APTT PPP: (no result) S
BACTERIA #/AREA URNS HPF: (no result) /HPF
BILIRUB UR QL STRIP: NEGATIVE
FIBRINOGEN PPP-MCNC: CLEAR MG/DL
GLUCOSE UR STRIP-MCNC: NEGATIVE MG/DL
KETONES UR QL STRIP: NEGATIVE
LEUKOCYTE ESTERASE UR QL STRIP: (no result)
NITRITE UR QL STRIP: NEGATIVE
PH UR STRIP: 6 [PH] (ref 5–9)
PROT UR QL STRIP: NEGATIVE
RBC #/AREA URNS HPF: (no result) /HPF
SP GR UR STRIP: 1.02 (ref 1.02–1.02)
SQUAMOUS #/AREA URNS HPF: (no result) /HPF
WBC #/AREA URNS HPF: (no result) /HPF

## 2021-11-27 PROCEDURE — 81000 URINALYSIS NONAUTO W/SCOPE: CPT

## 2021-11-27 PROCEDURE — 87088 URINE BACTERIA CULTURE: CPT

## 2021-11-28 VITALS — SYSTOLIC BLOOD PRESSURE: 147 MMHG | DIASTOLIC BLOOD PRESSURE: 84 MMHG

## 2021-12-16 VITALS — DIASTOLIC BLOOD PRESSURE: 70 MMHG | SYSTOLIC BLOOD PRESSURE: 153 MMHG

## 2021-12-16 VITALS — SYSTOLIC BLOOD PRESSURE: 128 MMHG | DIASTOLIC BLOOD PRESSURE: 67 MMHG

## 2021-12-16 VITALS — DIASTOLIC BLOOD PRESSURE: 86 MMHG | SYSTOLIC BLOOD PRESSURE: 161 MMHG

## 2021-12-16 VITALS — DIASTOLIC BLOOD PRESSURE: 83 MMHG | SYSTOLIC BLOOD PRESSURE: 139 MMHG

## 2021-12-16 VITALS — DIASTOLIC BLOOD PRESSURE: 68 MMHG | SYSTOLIC BLOOD PRESSURE: 147 MMHG

## 2021-12-16 VITALS — SYSTOLIC BLOOD PRESSURE: 154 MMHG | DIASTOLIC BLOOD PRESSURE: 71 MMHG

## 2021-12-16 VITALS — DIASTOLIC BLOOD PRESSURE: 81 MMHG | SYSTOLIC BLOOD PRESSURE: 133 MMHG

## 2021-12-16 VITALS — SYSTOLIC BLOOD PRESSURE: 136 MMHG | DIASTOLIC BLOOD PRESSURE: 73 MMHG

## 2021-12-16 VITALS — SYSTOLIC BLOOD PRESSURE: 149 MMHG | DIASTOLIC BLOOD PRESSURE: 69 MMHG

## 2021-12-16 VITALS — DIASTOLIC BLOOD PRESSURE: 57 MMHG | SYSTOLIC BLOOD PRESSURE: 122 MMHG

## 2021-12-16 VITALS — DIASTOLIC BLOOD PRESSURE: 60 MMHG | SYSTOLIC BLOOD PRESSURE: 125 MMHG

## 2021-12-16 VITALS — SYSTOLIC BLOOD PRESSURE: 134 MMHG | DIASTOLIC BLOOD PRESSURE: 74 MMHG

## 2021-12-16 VITALS — SYSTOLIC BLOOD PRESSURE: 176 MMHG | DIASTOLIC BLOOD PRESSURE: 94 MMHG

## 2021-12-16 VITALS — DIASTOLIC BLOOD PRESSURE: 81 MMHG | SYSTOLIC BLOOD PRESSURE: 163 MMHG

## 2021-12-16 VITALS — SYSTOLIC BLOOD PRESSURE: 152 MMHG | DIASTOLIC BLOOD PRESSURE: 72 MMHG

## 2021-12-16 VITALS — SYSTOLIC BLOOD PRESSURE: 145 MMHG | DIASTOLIC BLOOD PRESSURE: 80 MMHG

## 2021-12-16 VITALS — SYSTOLIC BLOOD PRESSURE: 144 MMHG | DIASTOLIC BLOOD PRESSURE: 78 MMHG

## 2021-12-16 VITALS — DIASTOLIC BLOOD PRESSURE: 72 MMHG | SYSTOLIC BLOOD PRESSURE: 134 MMHG

## 2021-12-16 VITALS — DIASTOLIC BLOOD PRESSURE: 79 MMHG | SYSTOLIC BLOOD PRESSURE: 140 MMHG

## 2021-12-16 VITALS — DIASTOLIC BLOOD PRESSURE: 85 MMHG | SYSTOLIC BLOOD PRESSURE: 139 MMHG

## 2021-12-16 VITALS — SYSTOLIC BLOOD PRESSURE: 142 MMHG | DIASTOLIC BLOOD PRESSURE: 66 MMHG

## 2021-12-16 VITALS — SYSTOLIC BLOOD PRESSURE: 139 MMHG | DIASTOLIC BLOOD PRESSURE: 66 MMHG

## 2021-12-16 VITALS — DIASTOLIC BLOOD PRESSURE: 74 MMHG | SYSTOLIC BLOOD PRESSURE: 150 MMHG

## 2021-12-16 VITALS — DIASTOLIC BLOOD PRESSURE: 90 MMHG | SYSTOLIC BLOOD PRESSURE: 135 MMHG

## 2021-12-16 VITALS — DIASTOLIC BLOOD PRESSURE: 77 MMHG | SYSTOLIC BLOOD PRESSURE: 138 MMHG

## 2021-12-16 VITALS — DIASTOLIC BLOOD PRESSURE: 76 MMHG | SYSTOLIC BLOOD PRESSURE: 132 MMHG

## 2021-12-16 VITALS — DIASTOLIC BLOOD PRESSURE: 83 MMHG | SYSTOLIC BLOOD PRESSURE: 145 MMHG

## 2021-12-16 VITALS — SYSTOLIC BLOOD PRESSURE: 166 MMHG | DIASTOLIC BLOOD PRESSURE: 92 MMHG

## 2021-12-16 VITALS — DIASTOLIC BLOOD PRESSURE: 70 MMHG | SYSTOLIC BLOOD PRESSURE: 152 MMHG

## 2021-12-16 VITALS — DIASTOLIC BLOOD PRESSURE: 64 MMHG | SYSTOLIC BLOOD PRESSURE: 134 MMHG

## 2021-12-16 VITALS — DIASTOLIC BLOOD PRESSURE: 82 MMHG | SYSTOLIC BLOOD PRESSURE: 144 MMHG

## 2021-12-16 VITALS — SYSTOLIC BLOOD PRESSURE: 138 MMHG | DIASTOLIC BLOOD PRESSURE: 76 MMHG

## 2021-12-16 VITALS — DIASTOLIC BLOOD PRESSURE: 83 MMHG | SYSTOLIC BLOOD PRESSURE: 144 MMHG

## 2021-12-16 VITALS — DIASTOLIC BLOOD PRESSURE: 97 MMHG | SYSTOLIC BLOOD PRESSURE: 163 MMHG

## 2021-12-16 VITALS — SYSTOLIC BLOOD PRESSURE: 121 MMHG | DIASTOLIC BLOOD PRESSURE: 60 MMHG

## 2021-12-16 VITALS — SYSTOLIC BLOOD PRESSURE: 147 MMHG | DIASTOLIC BLOOD PRESSURE: 72 MMHG

## 2021-12-16 VITALS — DIASTOLIC BLOOD PRESSURE: 77 MMHG | SYSTOLIC BLOOD PRESSURE: 133 MMHG

## 2021-12-16 VITALS — DIASTOLIC BLOOD PRESSURE: 76 MMHG | SYSTOLIC BLOOD PRESSURE: 138 MMHG

## 2021-12-16 VITALS — DIASTOLIC BLOOD PRESSURE: 70 MMHG | SYSTOLIC BLOOD PRESSURE: 139 MMHG

## 2021-12-16 VITALS — SYSTOLIC BLOOD PRESSURE: 139 MMHG | DIASTOLIC BLOOD PRESSURE: 69 MMHG

## 2021-12-16 VITALS — SYSTOLIC BLOOD PRESSURE: 154 MMHG | DIASTOLIC BLOOD PRESSURE: 67 MMHG

## 2021-12-16 VITALS — DIASTOLIC BLOOD PRESSURE: 69 MMHG | SYSTOLIC BLOOD PRESSURE: 139 MMHG

## 2021-12-16 VITALS — SYSTOLIC BLOOD PRESSURE: 141 MMHG | DIASTOLIC BLOOD PRESSURE: 81 MMHG

## 2021-12-16 VITALS — DIASTOLIC BLOOD PRESSURE: 78 MMHG | SYSTOLIC BLOOD PRESSURE: 135 MMHG

## 2021-12-16 VITALS — SYSTOLIC BLOOD PRESSURE: 130 MMHG | DIASTOLIC BLOOD PRESSURE: 86 MMHG

## 2021-12-16 VITALS — SYSTOLIC BLOOD PRESSURE: 138 MMHG | DIASTOLIC BLOOD PRESSURE: 77 MMHG

## 2021-12-16 VITALS — DIASTOLIC BLOOD PRESSURE: 70 MMHG | SYSTOLIC BLOOD PRESSURE: 137 MMHG

## 2021-12-16 VITALS — SYSTOLIC BLOOD PRESSURE: 138 MMHG | DIASTOLIC BLOOD PRESSURE: 91 MMHG

## 2021-12-16 VITALS — SYSTOLIC BLOOD PRESSURE: 146 MMHG | DIASTOLIC BLOOD PRESSURE: 67 MMHG

## 2021-12-16 VITALS — DIASTOLIC BLOOD PRESSURE: 65 MMHG | SYSTOLIC BLOOD PRESSURE: 147 MMHG

## 2021-12-16 VITALS — SYSTOLIC BLOOD PRESSURE: 146 MMHG | DIASTOLIC BLOOD PRESSURE: 75 MMHG

## 2021-12-16 VITALS — DIASTOLIC BLOOD PRESSURE: 65 MMHG | SYSTOLIC BLOOD PRESSURE: 134 MMHG

## 2021-12-16 VITALS — DIASTOLIC BLOOD PRESSURE: 59 MMHG | SYSTOLIC BLOOD PRESSURE: 119 MMHG

## 2021-12-16 VITALS — SYSTOLIC BLOOD PRESSURE: 140 MMHG | DIASTOLIC BLOOD PRESSURE: 76 MMHG

## 2021-12-16 VITALS — DIASTOLIC BLOOD PRESSURE: 59 MMHG | SYSTOLIC BLOOD PRESSURE: 121 MMHG

## 2021-12-16 VITALS — DIASTOLIC BLOOD PRESSURE: 72 MMHG | SYSTOLIC BLOOD PRESSURE: 144 MMHG

## 2021-12-16 VITALS — SYSTOLIC BLOOD PRESSURE: 146 MMHG | DIASTOLIC BLOOD PRESSURE: 65 MMHG

## 2021-12-16 VITALS — DIASTOLIC BLOOD PRESSURE: 69 MMHG | SYSTOLIC BLOOD PRESSURE: 178 MMHG

## 2021-12-16 VITALS — DIASTOLIC BLOOD PRESSURE: 70 MMHG | SYSTOLIC BLOOD PRESSURE: 136 MMHG

## 2021-12-16 VITALS — DIASTOLIC BLOOD PRESSURE: 52 MMHG | SYSTOLIC BLOOD PRESSURE: 108 MMHG

## 2021-12-16 VITALS — DIASTOLIC BLOOD PRESSURE: 59 MMHG | SYSTOLIC BLOOD PRESSURE: 118 MMHG

## 2021-12-16 LAB
BASOPHILS # BLD AUTO: 0 10^3/UL (ref 0–0.1)
BASOPHILS NFR BLD AUTO: 0 % (ref 0–10)
BASOPHILS NFR BLD MANUAL: 0 %
EOSINOPHIL # BLD AUTO: 0.2 10^3/UL (ref 0–0.3)
EOSINOPHIL NFR BLD AUTO: 1 % (ref 0–10)
EOSINOPHIL NFR BLD MANUAL: 1 %
HCT VFR BLD CALC: 38 % (ref 35–52)
HGB BLD-MCNC: 13.1 G/DL (ref 11.5–16)
LYMPHOCYTES # BLD AUTO: 3.6 10^3/UL (ref 1–4)
LYMPHOCYTES NFR BLD AUTO: 26 % (ref 12–44)
MANUAL DIFFERENTIAL PERFORMED BLD QL: YES
MCH RBC QN AUTO: 31 PG (ref 25–34)
MCHC RBC AUTO-ENTMCNC: 35 G/DL (ref 32–36)
MCV RBC AUTO: 90 FL (ref 80–99)
MONOCYTES # BLD AUTO: 1.1 10^3/UL (ref 0–1)
MONOCYTES NFR BLD AUTO: 8 % (ref 0–12)
MONOCYTES NFR BLD: 8 %
NEUTROPHILS # BLD AUTO: 9.1 10^3/UL (ref 1.8–7.8)
NEUTROPHILS NFR BLD AUTO: 65 % (ref 42–75)
NEUTS BAND NFR BLD MANUAL: 60 %
NEUTS BAND NFR BLD: 1 %
PLATELET # BLD: 221 10^3/UL (ref 130–400)
PMV BLD AUTO: 10.8 FL (ref 9–12.2)
RBC MORPH BLD: NORMAL
VARIANT LYMPHS NFR BLD MANUAL: 30 %
WBC # BLD AUTO: 14.2 10^3/UL (ref 4.3–11)

## 2021-12-16 PROCEDURE — 0UQMXZZ REPAIR VULVA, EXTERNAL APPROACH: ICD-10-PCS | Performed by: FAMILY MEDICINE

## 2021-12-16 RX ADMIN — WATER SCH MLS/HR: 1 INJECTION INTRAMUSCULAR; INTRAVENOUS; SUBCUTANEOUS at 14:02

## 2021-12-16 RX ADMIN — IBUPROFEN PRN MG: 600 TABLET ORAL at 23:15

## 2021-12-16 RX ADMIN — Medication PRN ML: at 18:30

## 2021-12-16 RX ADMIN — DOCUSATE SODIUM SCH MG: 100 CAPSULE ORAL at 23:14

## 2021-12-16 RX ADMIN — WATER SCH MLS/HR: 1 INJECTION INTRAMUSCULAR; INTRAVENOUS; SUBCUTANEOUS at 18:30

## 2021-12-16 RX ADMIN — SODIUM CHLORIDE, SODIUM LACTATE, POTASSIUM CHLORIDE, CALCIUM CHLORIDE, AND DEXTROSE MONOHYDRATE SCH MLS/HR: 600; 310; 30; 20; 5 INJECTION, SOLUTION INTRAVENOUS at 15:31

## 2021-12-16 RX ADMIN — Medication SCH ML: at 22:16

## 2021-12-16 RX ADMIN — Medication PRN ML: at 09:52

## 2021-12-16 RX ADMIN — SODIUM CHLORIDE, SODIUM LACTATE, POTASSIUM CHLORIDE, CALCIUM CHLORIDE, AND DEXTROSE MONOHYDRATE SCH MLS/HR: 600; 310; 30; 20; 5 INJECTION, SOLUTION INTRAVENOUS at 07:32

## 2021-12-16 NOTE — HISTORY & PHYSICAL-OB
OB - Chief Complaint & HPI


Date/Time


Date of Admission:


Date of Admission:  Dec 16, 2021 at 06:03


Date seen by a Provider:  Dec 16, 2021


Time Seen by a Provider:  08:17





Chief Complaint/History


OB-Reason for Admission/Chief:  Induction of Labor


Hx :  4


Hx Para:  1021


Expected Date of Delivery:  Dec 22, 2021


Gestational Age in Weeks:  39


Gestational Age in Days:  1


Other reason for admission:


Intermittent hypertension complicating pregnancy, followed with labs and weekly 

BPP, growth at 35 weeks 60%, repeat growth at 37 weeks EFW 3147 grams (67th%). 

EFW on  3309 (57th%).


History of Labs


B+ antibody neg, RI, HIV/HepB/RPR NR. Elevated blood pressure as early as 12 

weeks intermittently, normal 24 hour urine protein and CMP at baseline and at 21

weeks, then BP improved for sometime. 1 hour glucola normal. GBS bacteriuria.





Allergies and Home Medications


Allergies


Coded Allergies:  


     diphenhydramine (Verified  Allergy, Unknown, 19)





Patient Home Medication List


Home Medication List Reviewed:  Yes


Fexofenadine HCl (Allegra Allergy) 60 Mg Tablet, 60 MG PO DAILY, (Reported)


   Entered as Reported by: BAKARI MATSON on 21


   Last Action: Reviewed





OB - History


Hx of Present Pregnancy


Prenatal Care:  Yes


Ultrasounds:  Normal mid trimester US


Obstetrical Complications:  Gestational Hypertension





Prenatal Information


Pregnancy Induced Hypertension:  Yes


Maternal Gestational Diabetes:  No


Postpartum Hemorrhage:  No





Obstetrical History


Hx :  4


Hx Para:  1


Hx # Term Pregnancies:  1


Hx #  Pregnancies:  0


Number of Living Children:  1


Hx Pregnancy Termination:  No


Hx Total # of Abortions (Spona:  2


Hx Multiple Gestation:  No


Hx Ectopic Pregnancy:  No


Hx Stillbirth:  No


Hx Pregnancy Complication:  Yes


Hx Pregnancy Induced Hypertens:  Yes


Hx Maternal Gestational Diabet:  No


Hx Postpartum Hemorrhage:  No





Delivery History


Hx Dystocia:  No


Hx Forceps Assisted Delivery:  No


Hx Vacuum Extraction Assisted:  No


Hx Placenta Abnormality:  No


Hx Fetal Distress:  No


Hx Large For Gestational Age I:  No


Hx Small for Gestational Age I:  No


Hx  Section:  No


Hx Vaginal Delivery Post C-Sec:  No


Hx Blood Disorders:  No


Adverse Rxn to Tranfusion:  No





Patient Past Medical History





PMHx:


ADHD


Anxiety


Bipolar disorder


Depression


Asthma





PSurgHx:


None





Social History/Family History


Alcohol Use:  Denies Use


Recreational Drug Use:  No


Smoking Cessation:  Current every day smoker


2nd Hand Smoke Exposure:  Yes





Immunizations


Influenza Vaccine Up-to-Date:  Yes; Up-to-Date


First/Initial COVID19 Vaccine:  2021


Second COVID19 Vaccination:  2021


Hepatitis A:  No


Hepatitis B:  Yes


Tetanus Booster (TDap):  Less than 5yrs (10/15/21)


Rubella:  immune


RPR/VDRL:  Negative


GBS Status:  Positive


HBsAG:  Negative





OB - Admission Exam


Physical Exam


Vitals:





Vital Signs








 21





 07:24


 


Temp 36.7


 


Pulse 98


 


Resp 18


 


Pulse Ox 99


 


O2 Delivery Room Air








HEENT:  NCAT


Abdomen:  Non tender


Extremities:  Normal


Cervical Dilatation:  4cm


Effacement:  0%


Station:  -3


Membranes:  Intact


Fetal Heart Rate:  120's


Accelerations:  Accelerations Present


Decelerations:  No Decelerations


Short Term Variability:  Present


Long Term Variability:  Average (6-25)


Contractions on Admission:  None





Pack Scoring Tool (Modified)


Dilation (cm):  3-4cm (2)


Effacement (%):  0-30%    (0)


Fetal Descent/Station:  -3        (0)


Cervix Consistency:  Soft      (2)


Cervix Position:  Posterior  (0)


Add 1 point for:  Each previous vaginal delivery (1)


Pack Score:  5


Labs





Laboratory Tests








Test


 21


06:40 Range/Units


 


 


White Blood Count


 14.2 H


 4.3-11.0


10^3/uL


 


Red Blood Count


 4.23 


 3.80-5.11


10^6/uL


 


Hemoglobin 13.1  11.5-16.0  g/dL


 


Hematocrit 38  35-52  %


 


Mean Corpuscular Volume 90  80-99  fL


 


Mean Corpuscular Hemoglobin 31  25-34  pg


 


Mean Corpuscular Hemoglobin


Concent 35 


 32-36  g/dL





 


Red Cell Distribution Width 13.3  10.0-14.5  %


 


Platelet Count


 221 


 130-400


10^3/uL


 


Mean Platelet Volume 10.8  9.0-12.2  fL


 


Immature Granulocyte % (Auto) 0   %


 


Neutrophils (%) (Auto) 65  42-75  %


 


Lymphocytes (%) (Auto) 26  12-44  %


 


Monocytes (%) (Auto) 8  0-12  %


 


Eosinophils (%) (Auto) 1  0-10  %


 


Basophils (%) (Auto) 0  0-10  %


 


Neutrophils # (Auto)


 9.1 H


 1.8-7.8


10^3/uL


 


Lymphocytes # (Auto)


 3.6 


 1.0-4.0


10^3/uL


 


Monocytes # (Auto)


 1.1 H


 0.0-1.0


10^3/uL


 


Eosinophils # (Auto)


 0.2 


 0.0-0.3


10^3/uL


 


Basophils # (Auto)


 0.0 


 0.0-0.1


10^3/uL


 


Immature Granulocyte # (Auto)


 0.1 


 0.0-0.1


10^3/uL


 


Neutrophils % (Manual) 60   %


 


Lymphocytes % (Manual) 30   %


 


Monocytes % (Manual) 8   %


 


Eosinophils % (Manual) 1   %


 


Basophils % (Manual) 0   %


 


Band Neutrophils 1   %


 


Blood Morphology Comment NORMAL   











OB - Assessment/Plan/Diagnosis


Assessment


Admission Dx


Term intrauterine pregnancy at 39 weeks gestation


Chronic vs gestational hypertension


GBS positive


Rubella immune


Tobacco use


Admission Status:  Inpatient Order (span 2 midnights)


Reason for Inpatient Admission:  


Labor, delivery and postpartum course





Plan


Plan:  Induction


Induction Method:  per Pitocin Protocol


Other Plan


Ampicillin for GBS pos


Blood pressure okay on admit, monitor closely











CLAUDY MCCOLLUM MD             Dec 16, 2021 08:22

## 2021-12-16 NOTE — OB LABOR & DELIVERY RECORD
Vag Delivery Note


Vag Delivery Note


Date of Delivery: 21 





Preoperative Diagnosis: Jr Rico  is a (31  /Para  4 / 1,

Gestational Age (wks)39with 1 day





Postoperative Diagnosis: Same


Surgeon: CLAUDY MCCOLLUM 





Anesthesia: Epidural





Delivery Type: 





Findings: 


Viable male infant, apgars 7/9, weight 6#9


Lacerations: bilateral periurethral


Intact placenta with 3 vessel cord. Nuchal cord x 1 easily reduced, no body cord

or shoulder dystocia





Estimated Blood Loss: 250 ml





Complications: None





Condition: Stable





Description of Procedure:





The patient is a 31 year old female who presented for induction of labor due to 

hypertension. She was admitted and informed consent was obtained. Her labor 

course was unremarkable. She progressed to complete dilatation and began to 

push. 





She was then set up for delivery. The infant's head was delivered atraumatically

in the OA position. Nuchal cord x 1 reduced at perineum. The shoulders and 

remainder of the infant's body were then delivered without difficulty. Upon 

delivery, the infant was placed on maternal abdomen. The cord was doubly clamped

and cut and the infant was vigorous and remained on maternal abdomen. An intact 

placenta with 3-vessel cord delivered via [place] and there was found to be 

minimal bleeding.~ Vigorous fundal massage was performed and the fundus was 

found to be firm. IV oxytocin was given. Examination of the vagina and perineum 

revealed a right perirurethral laceration repaired in simple running fashion 

with 3-0 vicryl rapide suture. Following the repair, sponge, instrument and 

needle counts were correct. Mom and baby were both in stable condition in the 

labor suite.





Vitals - Labs


Vital Signs - I&O





Vital Signs








  Date Time  Temp Pulse Resp B/P (MAP) Pulse Ox O2 Delivery O2 Flow Rate FiO2


 


21 19:00  82 18 119/59 (79)    


 


21 18:45  80 18 108/52 (70)    


 


21 18:30  85 18 133/77 (95)    


 


21 18:15  90 18 133/81 (98)    


 


21 18:00  87 18 130/86 (101)    


 


21 17:45  72 18 128/67 (87)    


 


21 17:15  75 18 118/59 (78)    


 


21 17:00  76 18 122/57 (78)    


 


21 16:45  75 18 121/60 (80)    


 


21 16:30  82 18 121/59 (79)    


 


21 16:15  86 18 138/77 (97)    


 


21 16:00  86 18 138/77 (97)    


 


21 15:45  90 18 135/78 (97)    


 


21 15:00  82 18 136/70 (92)    


 


21 14:45  86 18 136/73 (94)    


 


21 14:30  83 18 139/69 (92)    


 


21 14:15  86 18 137/70 (92)    


 


21 14:00  76 18 142/66 (91)    


 


21 13:45  86 18 153/70 (97)    


 


21 13:30  79 18 147/72 (97)    


 


21 13:00  87 18 144/82 (102)    


 


21 12:45  87 18 139/69 (92)    


 


21 12:00  107 18 147/65 (92)    


 


21 11:45  73 18 146/65 (92)    


 


21 11:30  95 18 154/67 (96)    


 


21 11:15  83 18 152/70 (97)    


 


21 11:00  88 18 146/67 (93)    


 


21 10:45  96 18 142/66 (91)    


 


21 10:30  79 18 149/69 (95)    


 


21 10:25  86 18 146/75 (98)    


 


21 10:20  87 18 138/76 (96)    


 


21 10:15  108 18 144/78 (100) 98   


 


21 10:08  96 18 139/66 (90) 98   


 


21 10:05  103 18 150/74 (99) 98   


 


21 10:01  99 18 154/71 (98) 98   


 


21 09:58  108 18 144/72 (96)    


 


21 09:55  108 18 134/74 (94) 99   


 


21 09:52  101 18 138/76 (96) 99   


 


21 09:49  101 18 144/83 (103) 99   


 


21 09:46  99 18 139/85 (103) 98   


 


21 09:43  111 18 135/90 (105)    


 


21 09:40  97 18 138/91 (107) 98   


 


21 09:15  90 18 141/81 (101)    


 


21 07:24 36.7 98 18  99 Room Air  











Labs


Laboratory Tests


21 06:40: 


White Blood Count 14.2H, Red Blood Count 4.23, Hemoglobin 13.1, Hematocrit 38, 

Mean Corpuscular Volume 90, Mean Corpuscular Hemoglobin 31, Mean Corpuscular 

Hemoglobin Concent 35, Red Cell Distribution Width 13.3, Platelet Count 221, 

Mean Platelet Volume 10.8, Immature Granulocyte % (Auto) 0, Neutrophils (%) 

(Auto) 65, Lymphocytes (%) (Auto) 26, Monocytes (%) (Auto) 8, Eosinophils (%) 

(Auto) 1, Basophils (%) (Auto) 0, Neutrophils # (Auto) 9.1H, Lymphocytes # 

(Auto) 3.6, Monocytes # (Auto) 1.1H, Eosinophils # (Auto) 0.2, Basophils # 

(Auto) 0.0, Immature Granulocyte # (Auto) 0.1, Neutrophils % (Manual) 60, 

Lymphocytes % (Manual) 30, Monocytes % (Manual) 8, Eosinophils % (Manual) 1, 

Basophils % (Manual) 0, Band Neutrophils 1, Blood Morphology Comment NORMAL











CLAUDY MCCOLLUM MD             Dec 16, 2021 20:50

## 2021-12-17 VITALS — DIASTOLIC BLOOD PRESSURE: 82 MMHG | SYSTOLIC BLOOD PRESSURE: 144 MMHG

## 2021-12-17 VITALS — DIASTOLIC BLOOD PRESSURE: 77 MMHG | SYSTOLIC BLOOD PRESSURE: 133 MMHG

## 2021-12-17 VITALS — SYSTOLIC BLOOD PRESSURE: 134 MMHG | DIASTOLIC BLOOD PRESSURE: 72 MMHG

## 2021-12-17 VITALS — DIASTOLIC BLOOD PRESSURE: 93 MMHG | SYSTOLIC BLOOD PRESSURE: 139 MMHG

## 2021-12-17 LAB
BASOPHILS # BLD AUTO: 0 10^3/UL (ref 0–0.1)
BASOPHILS NFR BLD AUTO: 0 % (ref 0–10)
EOSINOPHIL # BLD AUTO: 0.2 10^3/UL (ref 0–0.3)
EOSINOPHIL NFR BLD AUTO: 1 % (ref 0–10)
HCT VFR BLD CALC: 35 % (ref 35–52)
HGB BLD-MCNC: 12.2 G/DL (ref 11.5–16)
LYMPHOCYTES # BLD AUTO: 3.3 10^3/UL (ref 1–4)
LYMPHOCYTES NFR BLD AUTO: 25 % (ref 12–44)
MANUAL DIFFERENTIAL PERFORMED BLD QL: NO
MCH RBC QN AUTO: 31 PG (ref 25–34)
MCHC RBC AUTO-ENTMCNC: 35 G/DL (ref 32–36)
MCV RBC AUTO: 91 FL (ref 80–99)
MONOCYTES # BLD AUTO: 0.9 10^3/UL (ref 0–1)
MONOCYTES NFR BLD AUTO: 6 % (ref 0–12)
NEUTROPHILS # BLD AUTO: 9.1 10^3/UL (ref 1.8–7.8)
NEUTROPHILS NFR BLD AUTO: 67 % (ref 42–75)
PLATELET # BLD: 163 10^3/UL (ref 130–400)
PMV BLD AUTO: 10.8 FL (ref 9–12.2)
WBC # BLD AUTO: 13.5 10^3/UL (ref 4.3–11)

## 2021-12-17 RX ADMIN — DOCUSATE SODIUM SCH MG: 100 CAPSULE ORAL at 08:17

## 2021-12-17 RX ADMIN — LORATADINE SCH MG: 10 TABLET ORAL at 08:17

## 2021-12-17 RX ADMIN — Medication SCH ML: at 05:44

## 2021-12-17 RX ADMIN — Medication SCH ML: at 22:05

## 2021-12-17 RX ADMIN — DOCUSATE SODIUM SCH MG: 100 CAPSULE ORAL at 21:11

## 2021-12-17 RX ADMIN — Medication SCH ML: at 20:54

## 2021-12-17 RX ADMIN — IBUPROFEN PRN MG: 600 TABLET ORAL at 08:17

## 2021-12-17 RX ADMIN — IBUPROFEN PRN MG: 600 TABLET ORAL at 15:55

## 2021-12-17 NOTE — PROGRESS NOTE
Subjective


Subjective/Events-last exam


Doing well.  BP improved since delivery.  Bleeding slowed.





Objective


Exam


Last Set of Vital Signs





Vital Signs








  Date Time  Temp Pulse Resp B/P (MAP) Pulse Ox O2 Delivery O2 Flow Rate FiO2


 


12/17/21 08:13 36.7 99 18 139/93 (108) 98 Room Air  





Capillary Refill : Less Than 3 Seconds


I&O











Intake and Output 


 


 12/17/21





 00:00


 


Intake Total 2664.8 ml


 


Balance 2664.8 ml


 


 


 


Intake IV Total 2664.8 ml


 


Daily Weight Change No








General:  Alert, Oriented X3, Cooperative


Psych/Mental Status:  Mental Status NL, Mood NL





Results/Procedures


Lab


Laboratory Tests


12/17/21 06:35: 


White Blood Count 13.5H, Red Blood Count 3.88, Hemoglobin 12.2, Hematocrit 35, 

Mean Corpuscular Volume 91, Mean Corpuscular Hemoglobin 31, Mean Corpuscular 

Hemoglobin Concent 35, Red Cell Distribution Width 13.4, Platelet Count 163, 

Mean Platelet Volume 10.8, Immature Granulocyte % (Auto) 0, Neutrophils (%) 

(Auto) 67, Lymphocytes (%) (Auto) 25, Monocytes (%) (Auto) 6, Eosinophils (%) 

(Auto) 1, Basophils (%) (Auto) 0, Neutrophils # (Auto) 9.1H, Lymphocytes # 

(Auto) 3.3, Monocytes # (Auto) 0.9, Eosinophils # (Auto) 0.2, Basophils # (Auto)

0.0, Immature Granulocyte # (Auto) 0.1





Assessment/Plan


Assessment/Plan





(1) Status post vaginal delivery


Assessment & Plan:  PPD#1


Hb 12.2





Routine postpartum care.


Anticipate DC home tomorrow.





(2) Gestational hypertension


Assessment & Plan:  s/p delivery at 39wk


- BP improved since delivery














JJ OVIEDO DO                Dec 17, 2021 11:37

## 2021-12-18 ENCOUNTER — HOSPITAL ENCOUNTER (INPATIENT)
Dept: HOSPITAL 75 - LDRP | Age: 31
Discharge: HOME | End: 2021-12-18
Attending: FAMILY MEDICINE | Admitting: FAMILY MEDICINE
Payer: COMMERCIAL

## 2021-12-18 VITALS — HEIGHT: 72 IN | BODY MASS INDEX: 38.13 KG/M2 | WEIGHT: 281.53 LBS

## 2021-12-18 VITALS — SYSTOLIC BLOOD PRESSURE: 160 MMHG | DIASTOLIC BLOOD PRESSURE: 98 MMHG

## 2021-12-18 VITALS — DIASTOLIC BLOOD PRESSURE: 80 MMHG | SYSTOLIC BLOOD PRESSURE: 138 MMHG

## 2021-12-18 DIAGNOSIS — Z3A.39: ICD-10-CM

## 2021-12-18 DIAGNOSIS — F17.210: ICD-10-CM

## 2021-12-18 DIAGNOSIS — F41.9: ICD-10-CM

## 2021-12-18 DIAGNOSIS — F90.9: ICD-10-CM

## 2021-12-18 DIAGNOSIS — F31.9: ICD-10-CM

## 2021-12-18 DIAGNOSIS — J45.909: ICD-10-CM

## 2021-12-18 PROCEDURE — 86900 BLOOD TYPING SEROLOGIC ABO: CPT

## 2021-12-18 PROCEDURE — 85025 COMPLETE CBC W/AUTO DIFF WBC: CPT

## 2021-12-18 PROCEDURE — 85027 COMPLETE CBC AUTOMATED: CPT

## 2021-12-18 PROCEDURE — 86850 RBC ANTIBODY SCREEN: CPT

## 2021-12-18 PROCEDURE — 36415 COLL VENOUS BLD VENIPUNCTURE: CPT

## 2021-12-18 PROCEDURE — 86901 BLOOD TYPING SEROLOGIC RH(D): CPT

## 2021-12-18 PROCEDURE — 85007 BL SMEAR W/DIFF WBC COUNT: CPT

## 2021-12-18 RX ADMIN — IBUPROFEN PRN MG: 600 TABLET ORAL at 08:15

## 2021-12-18 RX ADMIN — Medication SCH ML: at 06:04

## 2021-12-18 RX ADMIN — LORATADINE SCH MG: 10 TABLET ORAL at 08:31

## 2021-12-18 RX ADMIN — DOCUSATE SODIUM SCH MG: 100 CAPSULE ORAL at 08:30

## 2021-12-18 NOTE — DISCHARGE SUMMARY
Discharge Summary


Hospital Course


Problems/Diagnosis:  


(1) Status post vaginal delivery


Status:  Acute


Assessment & Plan:  PPD#1


Hb 12.2





Routine postpartum care.





(2) Gestational hypertension


Status:  Acute


Assessment & Plan:  s/p delivery at 39wk


- BP improved since delivery





Hospital Course


Date of Admission: Dec 16, 2021 at 06:03 


Admission Diagnosis :  


Term intrauterine pregnancy at 39 weeks gestation


Gestational vs chronic hypertension


GBS positive


Rubella immune





Family Physician/Provider: Claudy Ledbetter MD  





Date of Discharge: 12/18/21 


Discharge Diagnosis: 


s/p spontaneous vaginal delivery


Elevated blood pressure








Hospital Course:


Pt admitted for IOL at 39 weeks due to intermittent hypertension without 

preeclampsia. Uncomplicated labor, delivery and postpartum course. BP elevated 

somewhat on day 2, but improved on day 3, patient has home monitor and will 

closely monitor at home.














Labs and Pending Lab Test:





Home Meds


Active


Ibu (Ibuprofen) 600 Mg Tablet 600 Mg PO Q6HR PRN


Reported


Allegra Allergy (Fexofenadine HCl) 60 Mg Tablet 60 Mg PO DAILY


Assessment/Pt DC Instructions


Follow up with Dr. Ledbetter in 6 weeks for postpartum visit.


Monitor blood pressure at least daily for the next week and call office if above

140/90.


Discharge Diet:  Regular Diet


Activity as Tolerated:  Yes (avoid strenuous activity x 6 weeks)





Discharge Physical Examination


Allergies:  


Coded Allergies:  


     diphenhydramine (Verified  Allergy, Unknown, 11/29/19)


General Appearance:  No Apparent Distress, WD/WN


Respiratory:  Lungs Clear, Normal Breath Sounds


Cardiovascular:  Regular Rate, Rhythm, No Murmur


Skin:  Normal Color, Warm/Dry


Neurologic/Psychiatric:  Alert, Normal Mood/Affect











CLAUDY LEDBETTER MD             Dec 18, 2021 08:29

## 2023-03-01 NOTE — XMS REPORT
[facility]

 Created on: 2016



Jr Rico

External Reference #: [id number]

: 1990

Sex: Female



Demographics







 Address  [address] [place]  [address]

 

 Home Phone  [phone]

 

 Preferred Language  Unknown

 

 Marital Status  Unknown

 

 [place] Affiliation  Unknown

 

 Race  White

 

 Ethnic Group  Not  or 





Author







 Author  CLAUDY MCCOLLUM

 

 [facility]  [employer]

 

 Address  Unknown

 

 Phone  Unavailable







Care Team Providers







 Care Team Member Name  Role  Phone

 

 CLAUDY MCCOLLUM  CP  Unavailable



                                                                



Allergies

          No Known Allergies                                                   
                                     



Problems

          





 Problem Type  Condition  Code  Onset Dates  Condition Status

 

 Problem  Proteinuria affecting pregnancy in second trimester  O12.12     Active

 

 Problem  Hypertension affecting pregnancy in second trimester  O16.2     Active

 

 Problem  Partial placenta previa, second trimester  O44.12     Active

 

 Assessment  Proteinuria affecting pregnancy in second trimester  O12.12     
Active

 

 Assessment  Hypertension affecting pregnancy in second trimester  O16.2     
Active

 

 Problem  Abnormal quad screen  O28.0     Active

 

 Problem  Elevated blood pressure affecting pregnancy in first trimester, 
antepartum  O13.1     Active



                                                                               
                                                                     



Medications

          No Known Medications                                                 
                                                 



Vital Signs

          





 Date/Time:  Oct 03, 2016

 

 Blood Pressure Diastolic  78 mmHg

 

 Blood Pressure Systolic  146 mmHg

 

 Height  71 in



                                                                              



Results

          No Known Results                                                     
               



Summary Purpose

          [employer] Submission Reached out to patient, has insurance been covering buPROPion XL (WELLBUTRIN XL) 150 MG 24 hr tablet.   With GoodRx discount coupon at [facility] patient would pay about $16 for quantity of 180.     Left message to return call.